# Patient Record
Sex: FEMALE | Race: WHITE | NOT HISPANIC OR LATINO | Employment: OTHER | URBAN - METROPOLITAN AREA
[De-identification: names, ages, dates, MRNs, and addresses within clinical notes are randomized per-mention and may not be internally consistent; named-entity substitution may affect disease eponyms.]

---

## 2017-01-25 RX ORDER — PEN NEEDLE, DIABETIC 31 GX5/16"
NEEDLE, DISPOSABLE MISCELLANEOUS
Qty: 100 EACH | Refills: 11 | Status: SHIPPED | OUTPATIENT
Start: 2017-01-25

## 2017-01-25 NOTE — TELEPHONE ENCOUNTER
Prescription approved per [facility] Refill Protocol.  Due for diabetes follow up in March  Maria Victoria Whittington RN

## 2017-01-25 NOTE — TELEPHONE ENCOUNTER
BD-UF III Short Pen needles     Did not see diagnosis to associate      Last Written Prescription Date: 12/16/2015  Last Fill Quantity: 100, # refills: prn  Last Office Visit with [facility], [facility] or [facility] prescribing provider:  09/21/2016        BP Readings from Last 3 Encounters:   09/21/16 138/70   07/19/16 128/80   05/18/16 136/76     MICROL        6   9/21/2016  No results found for this basename: microalbumin  CREATININE   Date Value Ref Range Status   06/01/2016 0.74 0.52 - 1.04 mg/dL Final   ]  GFR ESTIMATE   Date Value Ref Range Status   06/01/2016 80 >60 mL/min/1.7m2 Final     Comment:     Non  GFR Calc   04/24/2016 84 >60 mL/min/1.7m2 Final     Comment:     Non  GFR Calc   04/23/2016 88 >60 mL/min/1.7m2 Final     Comment:     Non  GFR Calc     GFR ESTIMATE IF BLACK   Date Value Ref Range Status   06/01/2016 >90   GFR Calc   >60 mL/min/1.7m2 Final   04/24/2016 >90   GFR Calc   >60 mL/min/1.7m2 Final   04/23/2016 >90   GFR Calc   >60 mL/min/1.7m2 Final     CHOL      156   9/21/2016  HDL       49   9/21/2016  LDL       66   9/21/2016  LDL       86   3/13/2013  TRIG      205   9/21/2016  CHOLHDLRATIO      3.2   9/10/2015  AST       20   6/1/2016  ALT       39   6/1/2016  A1C      9.6   12/14/2016  A1C      9.3   9/21/2016  A1C      9.6   4/21/2016  A1C      7.9   1/18/2016  A1C      7.8   9/10/2015  POTASSIUM   Date Value Ref Range Status   06/01/2016 4.2 3.4 - 5.3 mmol/L Final

## 2017-02-17 ENCOUNTER — HOSPITAL ENCOUNTER (OUTPATIENT)
Dept: MAMMOGRAPHY | Facility: CLINIC | Age: 62
Discharge: HOME | End: 2017-02-17
Attending: PHYSICIAN ASSISTANT | Admitting: PHYSICIAN ASSISTANT
Payer: COMMERCIAL

## 2017-02-17 DIAGNOSIS — Z12.31 VISIT FOR SCREENING MAMMOGRAM: ICD-10-CM

## 2017-02-17 PROCEDURE — G0202 SCR MAMMO BI INCL CAD: HCPCS

## 2017-03-13 ENCOUNTER — TELEPHONE (OUTPATIENT)
Dept: FAMILY MEDICINE | Facility: OTHER | Age: 62
End: 2017-03-13

## 2017-03-13 NOTE — TELEPHONE ENCOUNTER
"Per fax from [facility] \"Insurance will not cover Lantus Solostar, they will cover Basaglar, Levemir or tresiba, please change to one of these, or please advise and a Prior Authorization will be needed #[id number], ID#[id number], thanks      "

## 2017-03-22 NOTE — PROGRESS NOTES
SUBJECTIVE:                                                    Janet Ram is a 62 year old female who presents to clinic today for the following health issues:        Diabetes Follow-up--    Patient is checking blood sugars: once daily.  Results are as follows:         am - 114, 126  States needs to do better with DM care improved diet    Diabetic concerns: None and other - NEEDS NEW TEST STRIPS     Symptoms of hypoglycemia (low blood sugar): none     Paresthesias (numbness or burning in feet) or sores: No BUT spasms in the legs, upper thighs and ankles.  Drinks plenty of water, thinks it is related to her 12 hour shifts, she is on her feet on hard cement.  Does not stretch      Date of last diabetic eye exam: last week     Hyperlipidemia Follow-Up      Rate your low fat/cholesterol diet?: fair    Taking statin?  Yes, no muscle aches from statin    Other lipid medications/supplements?:  Fish oil/Omega 3, dose 1200mg without side effects     Magnesium is also taking  Hypertension Follow-up      Outpatient blood pressures are not being checked.    Low Salt Diet: no added salt         Amount of exercise or physical activity: None    Problems taking medications regularly: No    Medication side effects: none    Diet: low salt      No concerns       Problem list and histories reviewed & adjusted, as indicated.  Additional history: as documented    Recent Labs   Lab Test  12/14/16   0847  09/21/16   1001  06/01/16   1024  04/24/16   0600  04/23/16   0830  04/21/16   0823   09/10/15   0921   02/25/15   0950   09/10/14   0936   A1C  9.6*  9.3*   --    --    --   9.6*   < >  7.8*   < >  8.0*   < >  8.2*   LDL   --   66   --    --    --    --    --   70   --   63   --    --    HDL   --   49*   --    --    --    --    --   46*   --   45*   --    --    TRIG   --   205*   --    --    --    --    --   150   --   184*   --    --    ALT   --    --   39  16  19   --    < >   --    --   31   --    --    CR   --    --   0.74   "0.71  0.68   --    < >   --    --   0.68   --    --    GFRESTIMATED   --    --   80  84  88   --    < >   --    --   88   --    --    GFRESTBLACK   --    --   >90   GFR Calc    >90   GFR Calc    >90   GFR Calc     --    < >   --    --   >90   GFR Calc     --    --    POTASSIUM   --    --   4.2  4.1  4.1   --    < >   --    --   4.2   --    --    TSH   --   3.83   --    --    --    --    --    --    --    --    --   3.65    < > = values in this interval not displayed.      BP Readings from Last 3 Encounters:   03/27/17 136/72   09/21/16 138/70   07/19/16 128/80    Wt Readings from Last 3 Encounters:   03/27/17 223 lb (101.2 kg)   09/21/16 225 lb (102.1 kg)   06/07/16 225 lb (102.1 kg)                    Reviewed and updated as needed this visit by clinical staff       Reviewed and updated as needed this visit by Provider         ROS:  C: NEGATIVE for fever, chills, change in weight  E/M: NEGATIVE for ear, mouth and throat problems  R: NEGATIVE for significant cough or SOB  CV: NEGATIVE for chest pain, palpitations or peripheral edema  GI: NEGATIVE for nausea, abdominal pain, heartburn, or change in bowel habits  MUSCULOSKELETAL: leg cramps  PSYCHIATRIC: NEGATIVE for changes in mood or affect, a lot of work stress, she is planning to look for a new job    OBJECTIVE:                                                    /72  Pulse 68  Temp 97.3  F (36.3  C) (Temporal)  Resp 14  Ht 5' 4.41\" (1.636 m)  Wt 223 lb (101.2 kg)  BMI 37.79 kg/m2  Body mass index is 37.79 kg/(m^2).  GENERAL: healthy, alert and no distress  NECK: no adenopathy, no asymmetry, masses, or scars and thyroid normal to palpation  RESP: lungs clear to auscultation - no rales, rhonchi or wheezes  CV: regular rate and rhythm, normal S1 S2, no S3 or S4, no murmur, click or rub, no peripheral edema and peripheral pulses strong  ABDOMEN: soft, nontender, no hepatosplenomegaly, no " masses and bowel sounds normal  MS: no gross musculoskeletal defects noted, no edema  SKIN: no suspicious lesions or rashes  PSYCH: mentation appears normal, affect normal/bright  Diabetic foot exam: normal DP and PT pulses, no trophic changes or ulcerative lesions, normal sensory exam and normal monofilament exam    Diagnostic Test Results:  No results found for this or any previous visit (from the past 24 hour(s)).     ASSESSMENT/PLAN:                                                          1. Uncontrolled type 2 diabetes mellitus without complication, without long-term current use of insulin (H)  Has had poor control, likely will order DM ed, pt is agreement   - HONORING CHOICES REFERRAL  - order for DME; Test strips for pt's glucometer, brand as covered by insurance. Test daily  Dispense: 100 each; Refill: 4  - Hemoglobin A1c  - FOOT EXAM    2. Screening for diabetic retinopathy  Eye exam last week, abstracted in    3. Hypertension goal BP (blood pressure) < 140/90  At goal      Recheck in 6 months     Estephanie Mancia PA-C  [facility]  Electronically signed by Estephanie Mancia PA-C

## 2017-03-24 ENCOUNTER — TRANSFERRED RECORDS (OUTPATIENT)
Dept: HEALTH INFORMATION MANAGEMENT | Facility: CLINIC | Age: 62
End: 2017-03-24

## 2017-03-27 ENCOUNTER — OFFICE VISIT (OUTPATIENT)
Dept: FAMILY MEDICINE | Facility: OTHER | Age: 62
End: 2017-03-27
Payer: COMMERCIAL

## 2017-03-27 ENCOUNTER — TELEPHONE (OUTPATIENT)
Dept: FAMILY MEDICINE | Facility: OTHER | Age: 62
End: 2017-03-27

## 2017-03-27 VITALS
TEMPERATURE: 97.3 F | DIASTOLIC BLOOD PRESSURE: 72 MMHG | BODY MASS INDEX: 38.07 KG/M2 | WEIGHT: 223 LBS | RESPIRATION RATE: 14 BRPM | HEIGHT: 64 IN | HEART RATE: 68 BPM | SYSTOLIC BLOOD PRESSURE: 136 MMHG

## 2017-03-27 DIAGNOSIS — Z13.5 SCREENING FOR DIABETIC RETINOPATHY: Primary | ICD-10-CM

## 2017-03-27 DIAGNOSIS — I10 HYPERTENSION GOAL BP (BLOOD PRESSURE) < 140/90: ICD-10-CM

## 2017-03-27 LAB — HBA1C MFR BLD: 10.1 % (ref 4.3–6)

## 2017-03-27 PROCEDURE — 99207 C FOOT EXAM  NO CHARGE: CPT | Performed by: PHYSICIAN ASSISTANT

## 2017-03-27 PROCEDURE — 99213 OFFICE O/P EST LOW 20 MIN: CPT | Performed by: PHYSICIAN ASSISTANT

## 2017-03-27 PROCEDURE — 83036 HEMOGLOBIN GLYCOSYLATED A1C: CPT | Performed by: PHYSICIAN ASSISTANT

## 2017-03-27 PROCEDURE — 36415 COLL VENOUS BLD VENIPUNCTURE: CPT | Performed by: PHYSICIAN ASSISTANT

## 2017-03-27 RX ORDER — ATORVASTATIN CALCIUM 40 MG/1
TABLET, FILM COATED ORAL
COMMUNITY
Start: 2017-03-13 | End: 2017-09-15

## 2017-03-27 ASSESSMENT — PAIN SCALES - GENERAL: PAINLEVEL: NO PAIN (0)

## 2017-03-27 NOTE — NURSING NOTE
"Chief Complaint   Patient presents with     Hypertension     Diabetes     Lipids     Panel Management     Honoring choices, eye exam, dm mae, marco        Initial /72  Pulse 68  Temp 97.3  F (36.3  C) (Temporal)  Resp 14  Ht 5' 4.41\" (1.636 m)  Wt 223 lb (101.2 kg)  BMI 37.79 kg/m2 Estimated body mass index is 37.79 kg/(m^2) as calculated from the following:    Height as of this encounter: 5' 4.41\" (1.636 m).    Weight as of this encounter: 223 lb (101.2 kg).  Medication Reconciliation: complete   Idalia Roberts CMA  (AAMA)'      "

## 2017-03-27 NOTE — MR AVS SNAPSHOT
"              After Visit Summary   3/27/2017    Janet Ram    MRN: [id number]           Patient Information     Date Of Birth          1955        Visit Information        Provider Department      3/27/2017 9:00 AM Estephanie Mancia PA-C [facility]        Today's Diagnoses     Screening for diabetic retinopathy    -  1    Uncontrolled type 2 diabetes mellitus without complication, without long-term current use of insulin (H)           Follow-ups after your visit        Who to contact     If you have questions or need follow up information about today's clinic visit or your schedule please contact [facility] directly at [phone].  Normal or non-critical lab and imaging results will be communicated to you by MyChart, letter or phone within 4 business days after the clinic has received the results. If you do not hear from us within 7 days, please contact the clinic through MyChart or phone. If you have a critical or abnormal lab result, we will notify you by phone as soon as possible.  Submit refill requests through Crunched or call your pharmacy and they will forward the refill request to us. Please allow 3 business days for your refill to be completed.          Additional Information About Your Visit        MyChart Information     Crunched lets you send messages to your doctor, view your test results, renew your prescriptions, schedule appointments and more. To sign up, go to [email or web address] . Click on \"Log in\" on the left side of the screen, which will take you to the Welcome page. Then click on \"Sign up Now\" on the right side of the page.     You will be asked to enter the access code listed below, as well as some personal information. Please follow the directions to create your username and password.     Your access code is: [email or web address]  Expires: 2017  9:27 AM     Your access code will  in 90 days. If you need help or a new code, please call your " "[facility] or [phone].        Care EveryWhere ID     This is your Care EveryWhere ID. This could be used by other organizations to access your [facility] medical records  [id number]        Your Vitals Were     Pulse Temperature Respirations Height BMI (Body Mass Index)       68 97.3  F (36.3  C) (Temporal) 14 5' 4.41\" (1.636 m) 37.79 kg/m2        Blood Pressure from Last 3 Encounters:   03/27/17 136/72   09/21/16 138/70   07/19/16 128/80    Weight from Last 3 Encounters:   03/27/17 223 lb (101.2 kg)   09/21/16 225 lb (102.1 kg)   06/07/16 225 lb (102.1 kg)              Today, you had the following     No orders found for display         Today's Medication Changes          These changes are accurate as of: 3/27/17  9:27 AM.  If you have any questions, ask your nurse or doctor.               These medicines have changed or have updated prescriptions.        Dose/Directions    atorvastatin 40 MG tablet   Commonly known as:  LIPITOR   This may have changed:  Another medication with the same name was removed. Continue taking this medication, and follow the directions you see here.   Changed by:  Estephanie Mancia PA-C        Refills:  0                Primary Care Provider Office Phone # Fax #    Estephanie Mancia PA-C [phone]       [facility] [address] [place] [address]        Thank you!     Thank you for choosing [facility]  for your care. Our goal is always to provide you with excellent care. Hearing back from our patients is one way we can continue to improve our services. Please take a few minutes to complete the written survey that you may receive in the mail after your visit with us. Thank you!             Your Updated Medication List - Protect others around you: Learn how to safely use, store and throw away your medicines at www.disposemymeds.org.          This list is accurate as of: 3/27/17  9:27 AM.  Always use your most recent med list.          "          Brand Name Dispense Instructions for use    aspirin 81 MG tablet      1 TABLET DAILY       atorvastatin 40 MG tablet    LIPITOR         B-D U/F 31G X 8 MM   Generic drug:  insulin pen needle     100 each    USE ONE  ONCE DAILY OR  AS  DIRECTED       WYATT CONTOUR test strip   Generic drug:  blood glucose monitoring     50 each    TEST BLOOD SUGARS ONE TIME DAILY       bisacodyl 5 MG EC tablet    DULCOLAX     Take 5 mg by mouth daily as needed for constipation (she took 3 tablets a week ago Friday) Reported on 3/27/2017       glipiZIDE 10 MG 24 hr tablet    GLUCOTROL XL    180 tablet    Take 1 tablet (10 mg) by mouth 2 times daily       insulin detemir 100 UNIT/ML injection    LEVEMIR FLEXPEN/FLEXTOUCH    3 mL    18 units at bedtime       lisinopril 10 MG tablet    PRINIVIL/ZESTRIL    90 tablet    Take 1 tablet (10 mg) by mouth daily       metFORMIN 1000 MG tablet    GLUCOPHAGE    180 tablet    TAKE ONE TABLET BY MOUTH TWICE DAILY WITH MEALS       ONE TOUCH LANCETS Misc     month supply    use tid       order for DME     1 Box    Equipment being ordered: needles for solostar flex pen       polyethylene glycol powder    MIRALAX/GLYCOLAX    850 g    Take 17 g (1 capful) by mouth daily       senna-docusate 8.6-50 MG per tablet    SENOKOT-S;PERICOLACE    100 tablet    Take 1-2 tablets by mouth 2 times daily as needed for constipation

## 2017-03-27 NOTE — TELEPHONE ENCOUNTER
Please call pt- her hgb a1c is now higher yet, it is 10.1.  i will order diabetic ed for her as we discussed.  I will see her again in 6 months.  They will call her to schedule this appointment ( if that is incorrect, please help her set this up)   Estephanie Mancia PA-C

## 2017-04-19 ENCOUNTER — TELEPHONE (OUTPATIENT)
Dept: FAMILY MEDICINE | Facility: OTHER | Age: 62
End: 2017-04-19

## 2017-04-19 DIAGNOSIS — E11.9 TYPE 2 DIABETES, HBA1C GOAL < 7% (H): Primary | ICD-10-CM

## 2017-04-19 NOTE — TELEPHONE ENCOUNTER
[facility] is requesting a new rx for blood glucose meter, lancets, lancing device and alcohol prep pads.

## 2017-04-20 RX ORDER — LANCETS
EACH MISCELLANEOUS
Qty: 1 BOX | Refills: 3 | Status: SHIPPED | OUTPATIENT
Start: 2017-04-20 | End: 2020-02-14

## 2017-04-20 NOTE — TELEPHONE ENCOUNTER
Updated med list and rx sent.    Prescription approved per [facility] Refill Protocol.  Hang Barron RN

## 2017-05-22 ENCOUNTER — TELEPHONE (OUTPATIENT)
Dept: FAMILY MEDICINE | Facility: OTHER | Age: 62
End: 2017-05-22

## 2017-05-22 DIAGNOSIS — E11.9 TYPE 2 DIABETES, HBA1C GOAL < 7% (H): Primary | ICD-10-CM

## 2017-05-22 NOTE — TELEPHONE ENCOUNTER
Panel Management Review      Patient has the following on her problem list:     Diabetes    ASA: Passed    Last A1C  Lab Results   Component Value Date    A1C 10.1 03/27/2017    A1C 9.6 12/14/2016    A1C 9.3 09/21/2016    A1C 9.6 04/21/2016    A1C 7.9 01/18/2016     A1C tested: FAILED    Last LDL:    Lab Results   Component Value Date    CHOL 156 09/21/2016     Lab Results   Component Value Date    HDL 49 09/21/2016     Lab Results   Component Value Date    LDL 66 09/21/2016     Lab Results   Component Value Date    TRIG 205 09/21/2016     Lab Results   Component Value Date    CHOLHDLRATIO 3.2 09/10/2015     Lab Results   Component Value Date    NHDL 107 09/21/2016       Is the patient on a Statin? YES             Is the patient on Aspirin? YES    Medications     HMG CoA Reductase Inhibitors    atorvastatin (LIPITOR) 40 MG tablet    Salicylates    ASPIRIN 81 MG OR TABS          Last three blood pressure readings:  BP Readings from Last 3 Encounters:   03/27/17 136/72   09/21/16 138/70   07/19/16 128/80       Date of last diabetes office visit: 3/27/07     Tobacco History:     History   Smoking Status     Never Smoker   Smokeless Tobacco     Never Used         Hypertension   Last three blood pressure readings:  BP Readings from Last 3 Encounters:   03/27/17 136/72   09/21/16 138/70   07/19/16 128/80     Blood pressure: Passed    HTN Guidelines:  Age 18-59 BP range:  Less than 140/90  Age 60-85 with Diabetes:  Less than 140/90  Age 60-85 without Diabetes:  less than 150/90      Composite cancer screening  Chart review shows that this patient is due/due soon for the following None  Summary:    Patient is due/failing the following:   CMP, DM education, A1C and FOLLOW UP    Action needed:   Patient needs office visit for Diabetic recheck. Due 8/2017, please see if she is willing to schedule.  Patient needs non-fasting lab only appointment. Orders placed.  Patient needs to schedule with DM education. Referral placed,  just needs scheduling.    Type of outreach:    Phone, left message for patient to call back.     Questions for provider review:    None                                                                                                                                    Chloé Enrique CMA ([facility])       Chart routed to Care Team .

## 2017-06-23 DIAGNOSIS — E78.5 HYPERLIPIDEMIA LDL GOAL <100: ICD-10-CM

## 2017-06-26 RX ORDER — LISINOPRIL 10 MG/1
TABLET ORAL
Qty: 90 TABLET | Refills: 0 | Status: SHIPPED | OUTPATIENT
Start: 2017-06-26 | End: 2017-09-15

## 2017-06-26 NOTE — TELEPHONE ENCOUNTER
Routing refill request to provider for review/approval because:  Appears to be a break in medication - should have been out in March    Josie Flowers, RN, BSN

## 2017-06-26 NOTE — TELEPHONE ENCOUNTER
lisinopril (PRINIVIL,ZESTRIL) 10 MG tablet      Last Written Prescription Date: 9/21/16  Last Fill Quantity: 90, # refills: 1  Last Office Visit with [facility], [facility] or [facility] prescribing provider: 3/27/17 SIVA  Next 5 appointments (look out 90 days)     Aug 21, 2017  7:30 AM CDT   Office Visit with Estephanie Mancia PA-C   [facility]    [address] [facility] [address] [phone]                   Potassium   Date Value Ref Range Status   06/01/2016 4.2 3.4 - 5.3 mmol/L Final     Creatinine   Date Value Ref Range Status   06/01/2016 0.74 0.52 - 1.04 mg/dL Final     BP Readings from Last 3 Encounters:   03/27/17 136/72   09/21/16 138/70   07/19/16 128/80

## 2017-08-23 NOTE — PROGRESS NOTES
SUBJECTIVE:                                                    Janet Ram is a 62 year old female who presents to clinic today for the following health issues:      History of Present Illness   Diabetes:     Frequency of checking blood sugars::  Rarely (had been very lax, last tested in April 129)    Diabetic concerns::  None    Hypoglycemia symptoms::  None    Paraesthesia present::  YES    Eye Exam in the last year::  Yes    03 23 2017  Hyperlipidemia:     Low fat/chol diet rating::  Fair (trying to do low carb)    Taking Statins::  YES    Side effects from hypolipidemia medication::  No muscle aches from Statin    Lipid Medications or Supplements::  None  Hypertension:     Outpatient blood pressures:  Are not being checked    Dietary sodium intake::  Low salt diet  Diet::  Diabetic (she has gained weight )  Frequency of exercise::  None  Taking medications regularly::  Yes  Medication side effects::  None  Additional concerns today::  No      Has had a lot of stress recently, her sister in law is in residential related to alcohol use and they have been in charge of helping her as her in laws are away    Problem list and histories reviewed & adjusted, as indicated.  Additional history: as documented        Recent Labs   Lab Test  03/27/17   0928  12/14/16   0847  09/21/16   1001  06/01/16   1024  04/24/16   0600  04/23/16   0830   09/10/15   0921   02/25/15   0950   09/10/14   0936   A1C  10.1*  9.6*  9.3*   --    --    --    < >  7.8*   < >  8.0*   < >  8.2*   LDL   --    --   66   --    --    --    --   70   --   63   --    --    HDL   --    --   49*   --    --    --    --   46*   --   45*   --    --    TRIG   --    --   205*   --    --    --    --   150   --   184*   --    --    ALT   --    --    --   39  16  19   < >   --    --   31   --    --    CR   --    --    --   0.74  0.71  0.68   < >   --    --   0.68   --    --    GFRESTIMATED   --    --    --   80  84  88   < >   --    --   88   --    --   "  GFRESTBLACK   --    --    --   >90   GFR Calc    >90   GFR Calc    >90   GFR Calc     < >   --    --   >90   GFR Calc     --    --    POTASSIUM   --    --    --   4.2  4.1  4.1   < >   --    --   4.2   --    --    TSH   --    --   3.83   --    --    --    --    --    --    --    --   3.65    < > = values in this interval not displayed.      BP Readings from Last 3 Encounters:   08/29/17 132/84   03/27/17 136/72   09/21/16 138/70    Wt Readings from Last 3 Encounters:   08/29/17 226 lb 4.8 oz (102.6 kg)   03/27/17 223 lb (101.2 kg)   09/21/16 225 lb (102.1 kg)                  Labs reviewed in EPIC        ROS:  C: NEGATIVE for fever, chills, change in weight  E/M: NEGATIVE for ear, mouth and throat problems  R: NEGATIVE for significant cough or SOB  CV: NEGATIVE for chest pain, palpitations or peripheral edema  GI: NEGATIVE for nausea, abdominal pain, heartburn, or change in bowel habits  PSYCHIATRIC: feels stressed and anxious, might be better now as she is in care home as above    OBJECTIVE:     /84 (Cuff Size: Adult Regular)  Pulse 72  Temp 97.8  F (36.6  C) (Temporal)  Resp 16  Ht 5' 4.5\" (1.638 m)  Wt 226 lb 4.8 oz (102.6 kg)  BMI 38.24 kg/m2  Body mass index is 38.24 kg/(m^2).  GENERAL: healthy, alert and no distress  NECK: no adenopathy, no asymmetry, masses, or scars and thyroid normal to palpation  RESP: lungs clear to auscultation - no rales, rhonchi or wheezes  CV: regular rate and rhythm, normal S1 S2, no S3 or S4, no murmur, click or rub, no peripheral edema and peripheral pulses strong  ABDOMEN: soft, nontender, no hepatosplenomegaly, no masses and bowel sounds normal  MS: no gross musculoskeletal defects noted, no edema  PSYCH: mentation appears normal, affect normal/bright  Diabetic foot exam: normal DP and PT pulses, no trophic changes or ulcerative lesions and normal sensory exam    Diagnostic Test Results:  No results found " for this or any previous visit (from the past 24 hour(s)).    ASSESSMENT/PLAN:         1. Hypertension goal BP (blood pressure) < 140/90  At goal, though nearing upper limits of normal  - Comprehensive metabolic panel (BMP + Alb, Alk Phos, ALT, AST, Total. Bili, TP)    2. Uncontrolled type 2 diabetes mellitus without complication, without long-term current use of insulin (H)  Plans to see endocrine in Dec, for now she will work very hard with diet and exercise.  She will also lose weight and get back to her typical dm diet   - Comprehensive metabolic panel (BMP + Alb, Alk Phos, ALT, AST, Total. Bili, TP)  - Hemoglobin A1c  - ENDOCRINOLOGY ADULT REFERRAL    3. Hyperlipidemia LDL goal <100    - Lipid panel reflex to direct LDL  - Comprehensive metabolic panel (BMP + Alb, Alk Phos, ALT, AST, Total. Bili, TP)    Recheck a1c for sure in 3 months though likely will follow through with plans to follow up with endocrine    Estephanie Mancia PA-C  [facility]  Answers for HPI/ROS submitted by the patient on 8/29/2017   PHQ-2 Score: 0Electronically signed by Estephanie Mancia PA-C

## 2017-08-29 ENCOUNTER — OFFICE VISIT (OUTPATIENT)
Dept: FAMILY MEDICINE | Facility: OTHER | Age: 62
End: 2017-08-29
Payer: COMMERCIAL

## 2017-08-29 ENCOUNTER — TELEPHONE (OUTPATIENT)
Dept: FAMILY MEDICINE | Facility: OTHER | Age: 62
End: 2017-08-29

## 2017-08-29 VITALS
WEIGHT: 226.3 LBS | RESPIRATION RATE: 16 BRPM | DIASTOLIC BLOOD PRESSURE: 84 MMHG | SYSTOLIC BLOOD PRESSURE: 132 MMHG | HEIGHT: 65 IN | TEMPERATURE: 97.8 F | HEART RATE: 72 BPM | BODY MASS INDEX: 37.7 KG/M2

## 2017-08-29 DIAGNOSIS — I10 HYPERTENSION GOAL BP (BLOOD PRESSURE) < 140/90: Primary | ICD-10-CM

## 2017-08-29 DIAGNOSIS — E78.5 HYPERLIPIDEMIA LDL GOAL <100: ICD-10-CM

## 2017-08-29 LAB
ALBUMIN SERPL-MCNC: 3.3 G/DL (ref 3.4–5)
ALP SERPL-CCNC: 117 U/L (ref 40–150)
ALT SERPL W P-5'-P-CCNC: 28 U/L (ref 0–50)
ANION GAP SERPL CALCULATED.3IONS-SCNC: 9 MMOL/L (ref 3–14)
AST SERPL W P-5'-P-CCNC: 14 U/L (ref 0–45)
BILIRUB SERPL-MCNC: 1.3 MG/DL (ref 0.2–1.3)
BUN SERPL-MCNC: 13 MG/DL (ref 7–30)
CALCIUM SERPL-MCNC: 8.9 MG/DL (ref 8.5–10.1)
CHLORIDE SERPL-SCNC: 108 MMOL/L (ref 94–109)
CHOLEST SERPL-MCNC: 152 MG/DL
CO2 SERPL-SCNC: 25 MMOL/L (ref 20–32)
CREAT SERPL-MCNC: 0.82 MG/DL (ref 0.52–1.04)
GFR SERPL CREATININE-BSD FRML MDRD: 70 ML/MIN/1.7M2
GLUCOSE SERPL-MCNC: 224 MG/DL (ref 70–99)
HBA1C MFR BLD: 10.4 % (ref 4.3–6)
HDLC SERPL-MCNC: 46 MG/DL
LDLC SERPL CALC-MCNC: 66 MG/DL
NONHDLC SERPL-MCNC: 106 MG/DL
POTASSIUM SERPL-SCNC: 4.4 MMOL/L (ref 3.4–5.3)
PROT SERPL-MCNC: 7.1 G/DL (ref 6.8–8.8)
SODIUM SERPL-SCNC: 142 MMOL/L (ref 133–144)
TRIGL SERPL-MCNC: 201 MG/DL

## 2017-08-29 PROCEDURE — 99214 OFFICE O/P EST MOD 30 MIN: CPT | Performed by: PHYSICIAN ASSISTANT

## 2017-08-29 PROCEDURE — 80061 LIPID PANEL: CPT | Performed by: PHYSICIAN ASSISTANT

## 2017-08-29 PROCEDURE — 83036 HEMOGLOBIN GLYCOSYLATED A1C: CPT | Performed by: PHYSICIAN ASSISTANT

## 2017-08-29 PROCEDURE — 80053 COMPREHEN METABOLIC PANEL: CPT | Performed by: PHYSICIAN ASSISTANT

## 2017-08-29 PROCEDURE — 36415 COLL VENOUS BLD VENIPUNCTURE: CPT | Performed by: PHYSICIAN ASSISTANT

## 2017-08-29 ASSESSMENT — PAIN SCALES - GENERAL: PAINLEVEL: NO PAIN (0)

## 2017-08-29 NOTE — NURSING NOTE
"Chief Complaint   Patient presents with     Recheck Medication     Panel Management     See        Initial /84 (Cuff Size: Adult Regular)  Pulse 72  Temp 97.8  F (36.6  C) (Temporal)  Resp 16  Ht 5' 4.5\" (1.638 m)  Wt 226 lb 4.8 oz (102.6 kg)  BMI 38.24 kg/m2 Estimated body mass index is 38.24 kg/(m^2) as calculated from the following:    Height as of this encounter: 5' 4.5\" (1.638 m).    Weight as of this encounter: 226 lb 4.8 oz (102.6 kg).  Medication Reconciliation: complete   Quynh Feliz CMA (AAMA)    "

## 2017-08-29 NOTE — LETTER
August 29, 2017      Janet Ram  [address] [facility]   [place] [address]        Dear ,    We are writing to inform you of your test results. We tried to contact you by phone but were unsuccessful.      Your cholesterol is fairly well controlled with atorvastatin though the triglycerides are elevated.  This will improve with improved diabetic control.  Your kidney functions, liver functions and electrolytes are normal. Your blood sugar was high at 224. Your hemoglobin A1C is higher yet at 10.4.  Lets increase your levimir dosage to  21 units at bedtime.    Resulted Orders   Lipid panel reflex to direct LDL   Result Value Ref Range    Cholesterol 152 <200 mg/dL    Triglycerides 201 (H) <150 mg/dL      Comment:      Borderline high:  150-199 mg/dl  High:             200-499 mg/dl  Very high:       >499 mg/dl      HDL Cholesterol 46 (L) >49 mg/dL    LDL Cholesterol Calculated 66 <100 mg/dL      Comment:      Desirable:       <100 mg/dl    Non HDL Cholesterol 106 <130 mg/dL   Comprehensive metabolic panel (BMP + Alb, Alk Phos, ALT, AST, Total. Bili, TP)   Result Value Ref Range    Sodium 142 133 - 144 mmol/L    Potassium 4.4 3.4 - 5.3 mmol/L    Chloride 108 94 - 109 mmol/L    Carbon Dioxide 25 20 - 32 mmol/L    Anion Gap 9 3 - 14 mmol/L    Glucose 224 (H) 70 - 99 mg/dL    Urea Nitrogen 13 7 - 30 mg/dL    Creatinine 0.82 0.52 - 1.04 mg/dL    GFR Estimate 70 >60 mL/min/1.7m2      Comment:      Non  GFR Calc    GFR Estimate If Black 85 >60 mL/min/1.7m2      Comment:       GFR Calc    Calcium 8.9 8.5 - 10.1 mg/dL    Bilirubin Total 1.3 0.2 - 1.3 mg/dL    Albumin 3.3 (L) 3.4 - 5.0 g/dL    Protein Total 7.1 6.8 - 8.8 g/dL    Alkaline Phosphatase 117 40 - 150 U/L    ALT 28 0 - 50 U/L    AST 14 0 - 45 U/L   Hemoglobin A1c   Result Value Ref Range    Hemoglobin A1C 10.4 (H) 4.3 - 6.0 %       If you have any questions or concerns, please call the clinic at the number listed  above.       Sincerely,        Estephanie Mancia PA-C

## 2017-08-29 NOTE — TELEPHONE ENCOUNTER
Tried to call patient but the numbers we have are not correct or there was not an answer, so I mailed letter with results  Closing encounter  Malou Amaro RT (R)

## 2017-08-29 NOTE — TELEPHONE ENCOUNTER
Please call pt and mail results if she would like- your cholesterol is fairly well controlled with atorvastatin though the triglycerides are elevated.  This will improve with improved diabetic control.  Your kidney functions, liver functions and electrolytes are normal. Your blood sugar was high at 224. Your hemoglobin A1C is higher yet at 10.4.  Lets increase your levimir dosage to  21 units at bedtime  Estephanie Mancia PA-C

## 2017-08-29 NOTE — MR AVS SNAPSHOT
After Visit Summary   8/29/2017    Janet Ram    MRN: [id number]           Patient Information     Date Of Birth          1955        Visit Information        Provider Department      8/29/2017 8:00 AM Estephanie Mancia PA-C [facility]        Today's Diagnoses     Hypertension goal BP (blood pressure) < 140/90    -  1    Uncontrolled type 2 diabetes mellitus without complication, without long-term current use of insulin (H)        Hyperlipidemia LDL goal <100           Follow-ups after your visit        Additional Services     ENDOCRINOLOGY ADULT REFERRAL       Your provider has referred you to: [facility]: [facility] [phone]   [email or web address]      Please be aware that coverage of these services is subject to the terms and limitations of your health insurance plan.  Call member services at your health plan with any benefit or coverage questions.      Please bring the following to your appointment:    >>   Any x-rays, CTs or MRIs which have been performed.  Contact the facility where they were done to arrange for  prior to your scheduled appointment.    >>   List of current medications   >>   This referral request   >>   Any documents/labs given to you for this referral                  Your next 10 appointments already scheduled     Dec 20, 2017  9:00 AM CST   New Visit with Leandra Puri MD   [facility]    [id number] [place] [id number] [phone]              Who to contact     If you have questions or need follow up information about today's clinic visit or your schedule please contact [facility] [place] directly at [phone].  Normal or non-critical lab and imaging results will be communicated to you by MyChart, letter or phone within 4 business days after the clinic has received the results.  "If you do not hear from us within 7 days, please contact the clinic through [employer] or phone. If you have a critical or abnormal lab result, we will notify you by phone as soon as possible.  Submit refill requests through [employer] or call your pharmacy and they will forward the refill request to us. Please allow 3 business days for your refill to be completed.          Additional Information About Your Visit        [employer] Information     [employer] lets you send messages to your doctor, view your test results, renew your prescriptions, schedule appointments and more. To sign up, go to [email or web address] . Click on \"Log in\" on the left side of the screen, which will take you to the Welcome page. Then click on \"Sign up Now\" on the right side of the page.     You will be asked to enter the access code listed below, as well as some personal information. Please follow the directions to create your username and password.     Your access code is: [email or web address]  Expires: 2017  8:41 AM     Your access code will  in 90 days. If you need help or a new code, please call your [facility] clinic or [phone].        Care EveryWhere ID     This is your Care EveryWhere ID. This could be used by other organizations to access your [facility] medical records  [id number]        Your Vitals Were     Pulse Temperature Respirations Height BMI (Body Mass Index)       72 97.8  F (36.6  C) (Temporal) 16 5' 4.5\" (1.638 m) 38.24 kg/m2        Blood Pressure from Last 3 Encounters:   17 132/84   17 136/72   16 138/70    Weight from Last 3 Encounters:   17 226 lb 4.8 oz (102.6 kg)   17 223 lb (101.2 kg)   16 225 lb (102.1 kg)              We Performed the Following     Comprehensive metabolic panel (BMP + Alb, Alk Phos, ALT, AST, Total. Bili, TP)     ENDOCRINOLOGY ADULT REFERRAL     Hemoglobin A1c     Lipid panel reflex to direct LDL        Primary Care Provider Office Phone # Fax #    Estephanie " HALINA Mancia [phone]       [address] [place] [address]        Equal Access to Services     LUIS ANNE : Hadii aad ku hadeunicematthew Soflorencio, waasifda luprasanthhetalha, maurisiota katerrida tatiana, august renzoin hayaacarlos louisemarie barroso latremaynecarlos kirk. So [facility] [phone].    ATENCIÓN: Si habla español, tiene a vides disposición servicios gratuitos de asistencia lingüística. Llame al [phone].    We comply with applicable federal civil rights laws and [place] laws. We do not discriminate on the basis of race, color, national origin, age, disability sex, sexual orientation or gender identity.            Thank you!     Thank you for choosing [facility]  for your care. Our goal is always to provide you with excellent care. Hearing back from our patients is one way we can continue to improve our services. Please take a few minutes to complete the written survey that you may receive in the mail after your visit with us. Thank you!             Your Updated Medication List - Protect others around you: Learn how to safely use, store and throw away your medicines at www.disposemymeds.org.          This list is accurate as of: 8/29/17  8:41 AM.  Always use your most recent med list.                   Brand Name Dispense Instructions for use Diagnosis    Alcohol Wipes Pads     100 each    1 each daily Use to test blood sugar 1 times dailyPlease give patient meter, test trips and lancets as covered by insurance.    Type 2 diabetes, HbA1c goal < 7% (H), Uncontrolled diabetes mellitus type 2 without complications (H)       aspirin 81 MG tablet      1 TABLET DAILY        atorvastatin 40 MG tablet    LIPITOR          B-D U/F 31G X 8 MM   Generic drug:  insulin pen needle     100 each    USE ONE  ONCE DAILY OR  AS  DIRECTED    Type 2 diabetes mellitus, uncontrolled (H)       bisacodyl 5 MG EC tablet    DULCOLAX     Take 5 mg by mouth daily as needed for constipation (she took 3 tablets a week ago Friday) Reported  on 3/27/2017        blood glucose monitoring meter device kit    no brand specified    1 kit    Use to test blood sugar 1 times daily or as directed. Please give patient meter, test trips and lancets as covered by insurance.    Type 2 diabetes, HbA1c goal < 7% (H), Uncontrolled diabetes mellitus type 2 without complications (H)       blood glucose monitoring test strip    no brand specified    100 each    Use to test blood sugar 1 times daily or as directed. Please give patient meter, test trips and lancets as covered by insurance.    Type 2 diabetes, HbA1c goal < 7% (H), Uncontrolled diabetes mellitus type 2 without complications (H)       glipiZIDE 10 MG 24 hr tablet    GLUCOTROL XL    180 tablet    Take 1 tablet (10 mg) by mouth 2 times daily    Uncontrolled diabetes mellitus type 2 without complications (H)       insulin detemir 100 UNIT/ML injection    LEVEMIR FLEXPEN/FLEXTOUCH    3 mL    18 units at bedtime    Uncontrolled diabetes mellitus type 2 without complications, unspecified long term insulin use status (H)       lisinopril 10 MG tablet    PRINIVIL/ZESTRIL    90 tablet    TAKE ONE TABLET BY MOUTH ONCE DAILY    Hyperlipidemia LDL goal <100       metFORMIN 1000 MG tablet    GLUCOPHAGE    180 tablet    TAKE ONE TABLET BY MOUTH TWICE DAILY WITH MEALS    Uncontrolled diabetes mellitus type 2 without complications (H)       order for DME     1 Box    Equipment being ordered: needles for solostar flex pen    Type 2 diabetes, HbA1c goal < 7% (H)       polyethylene glycol powder    MIRALAX/GLYCOLAX    850 g    Take 17 g (1 capful) by mouth daily    Obstipation       senna-docusate 8.6-50 MG per tablet    SENOKOT-S;PERICOLACE    100 tablet    Take 1-2 tablets by mouth 2 times daily as needed for constipation    Obstipation       thin lancets    NO BRAND SPECIFIED    1 Box    Use to test blood sugar 1 times daily or as directed. Please give patient meter, test trips and lancets as covered by insurance.    Type 2  diabetes, HbA1c goal < 7% (H), Uncontrolled diabetes mellitus type 2 without complications (H)

## 2017-09-15 DIAGNOSIS — I10 HYPERTENSION GOAL BP (BLOOD PRESSURE) < 140/90: Primary | ICD-10-CM

## 2017-09-15 DIAGNOSIS — E78.5 HYPERLIPIDEMIA LDL GOAL <100: ICD-10-CM

## 2017-09-15 NOTE — TELEPHONE ENCOUNTER
lisinopril      Last Written Prescription Date: 06/26/17  Last Fill Quantity: 90, # refills: 0  Last Office Visit with G, [facility] or [facility] prescribing provider: 08/29/17       Potassium   Date Value Ref Range Status   08/29/2017 4.4 3.4 - 5.3 mmol/L Final     Creatinine   Date Value Ref Range Status   08/29/2017 0.82 0.52 - 1.04 mg/dL Final     BP Readings from Last 3 Encounters:   08/29/17 132/84   03/27/17 136/72   09/21/16 138/70

## 2017-09-18 RX ORDER — LISINOPRIL 10 MG/1
TABLET ORAL
Qty: 90 TABLET | Refills: 2 | Status: SHIPPED | OUTPATIENT
Start: 2017-09-18 | End: 2018-04-24

## 2017-11-29 RX ORDER — GLIPIZIDE 10 MG/1
TABLET, FILM COATED, EXTENDED RELEASE ORAL
Qty: 180 TABLET | Refills: 0 | Status: SHIPPED | OUTPATIENT
Start: 2017-11-29 | End: 2018-05-08

## 2017-11-29 NOTE — TELEPHONE ENCOUNTER
Requested Prescriptions   Pending Prescriptions Disp Refills     glipiZIDE (GLUCOTROL XL) 10 MG 24 hr tablet [Pharmacy Med Name: GLIPIZIDE XL 10MG   TAB] 180 tablet 1     Sig: TAKE ONE TABLET BY MOUTH TWICE DAILY    Sulfonylurea Agents Failed    11/29/2017  1:07 PM       Failed - Patient has had a Microalbumin in the past 12 mos.    Recent Labs   Lab Test  09/21/16   1001   MICROL  6   UMALCR  9.87            Failed - Patient has documented A1c within the specified period of time.    Recent Labs   Lab Test  08/29/17   0851   A1C  10.4*            Passed - Patient's BP is less than 140/90    BP Readings from Last 3 Encounters:   08/29/17 132/84   03/27/17 136/72   09/21/16 138/70                Passed - Patient has documented LDL within the past 12 mos.    Recent Labs   Lab Test  08/29/17   0851   LDL  66            Passed - Patient is age 18 or older       Passed - No active pregnancy on record       Passed - Patient has a recent creatinine (normal) within the past 12 mos.    Recent Labs   Lab Test  08/29/17   0851   CR  0.82            Passed - Patient has not had a positive pregnancy test within the past 12 mos.       Passed - Patient has had an appointment with authorizing provider within the past 6 mos. or  within next 30 days    Patient had office visit in the last 6 months or has a visit in the next 30 days with authorizing provider.  See chart review.             metFORMIN (GLUCOPHAGE) 1000 MG tablet [Pharmacy Med Name: METFORMIN 1000MG TAB] 180 tablet 1     Sig: TAKE ONE TABLET BY MOUTH TWICE DAILY WITH MEALS    Biguanide Agents Failed    11/29/2017  1:07 PM       Failed - Patient has had a Microalbumin in the past 12 mos.    Recent Labs   Lab Test  09/21/16   1001   MICROL  6   UMALCR  9.87            Failed - Patient has documented A1c within the specified period of time.    Recent Labs   Lab Test  08/29/17   0851   A1C  10.4*            Passed - Patient's BP is less than 140/90    BP Readings from Last 3  Encounters:   08/29/17 132/84   03/27/17 136/72   09/21/16 138/70                Passed - Patient has documented LDL within the past 12 mos.    Recent Labs   Lab Test  08/29/17   0851   LDL  66            Passed - Patient is age 10 or older       Passed - Patient's CR is NOT>1.4 OR Patient's EGFR is NOT<45 within past 12 mos.    Recent Labs   Lab Test  08/29/17   0851   GFRESTIMATED  70   GFRESTBLACK  85       Recent Labs   Lab Test  08/29/17   0851   CR  0.82            Passed - Patient does NOT have a diagnosis of CHF.       Passed - Patient is not pregnant       Passed - Patient has not had a positive pregnancy test within the past 12 mos.        Passed - Recent (6 mos) or future visit with authorizing provider's specialty    Patient had office visit in the last 6 months or has a visit in the next 30 days with authorizing provider.  See chart review.           glipiZIDE (GLUCOTROL XL) 10 MG 24 hr tablet  Routing refill request to provider for review/approval because:  Labs not current:  microalbumin  A break in medication, should have needed refills around 03/2017  Patient needs to be seen because:  Should be due for labs now and to follow up with Endocrine     metFORMIN (GLUCOPHAGE) 1000 MG tablet  Routing refill request to provider for review/approval because:  Labs not current:  microalbumin  A break in medication, should have needed refills around 03/2017  Patient needs to be seen because:  Should be due for labs now and to follow up with Endocrine     Shannan Martinez RN, BSN

## 2017-12-20 ENCOUNTER — OFFICE VISIT (OUTPATIENT)
Dept: ENDOCRINOLOGY | Facility: CLINIC | Age: 62
End: 2017-12-20
Attending: PHYSICIAN ASSISTANT
Payer: COMMERCIAL

## 2017-12-20 VITALS
BODY MASS INDEX: 37.43 KG/M2 | HEIGHT: 65 IN | SYSTOLIC BLOOD PRESSURE: 160 MMHG | HEART RATE: 76 BPM | DIASTOLIC BLOOD PRESSURE: 84 MMHG | WEIGHT: 224.65 LBS

## 2017-12-20 DIAGNOSIS — R53.83 FATIGUE, UNSPECIFIED TYPE: ICD-10-CM

## 2017-12-20 LAB — HBA1C MFR BLD: 10.8 % (ref 0–5.7)

## 2017-12-20 PROCEDURE — 99204 OFFICE O/P NEW MOD 45 MIN: CPT | Performed by: INTERNAL MEDICINE

## 2017-12-20 PROCEDURE — 36415 COLL VENOUS BLD VENIPUNCTURE: CPT | Performed by: INTERNAL MEDICINE

## 2017-12-20 PROCEDURE — 83036 HEMOGLOBIN GLYCOSYLATED A1C: CPT | Performed by: INTERNAL MEDICINE

## 2017-12-20 NOTE — NURSING NOTE
"Janet Ram's goals for this visit include: Follow up Diabetes  She requests these members of her care team be copied on today's visit information: YES    PCP: Estephanie Mancia    Referring Provider:  Estephanie Mancia PA-C  [address] [place] [address]    Chief Complaint   Patient presents with     Diabetes       Initial Ht 1.638 m (5' 4.5\")  Wt 101.9 kg (224 lb 10.4 oz)  BMI 37.97 kg/m2 Estimated body mass index is 37.97 kg/(m^2) as calculated from the following:    Height as of this encounter: 1.638 m (5' 4.5\").    Weight as of this encounter: 101.9 kg (224 lb 10.4 oz).  Medication Reconciliation: complete    Do you need any medication refills at today's visit? NO    "

## 2017-12-20 NOTE — MR AVS SNAPSHOT
After Visit Summary   12/20/2017    Janet Ram    MRN: [id number]           Patient Information     Date Of Birth          1955        Visit Information        Provider Department      12/20/2017 9:00 AM Leandra Puri MD [facility]        Today's Diagnoses     Uncontrolled type 2 diabetes mellitus without complication, with long-term current use of insulin (H)    -  1    Fatigue, unspecified type          Care Instructions    Check BG before breakfast and before dinner               Follow-ups after your visit        Follow-up notes from your care team     Return in about 3 months (around 3/20/2018) for Lab Work Prior to F/U Appt.      Your next 10 appointments already scheduled     Apr 19, 2018  8:00 AM CDT   LAB with NL LAB [facility] ([facility])    [address] [facility] [id number] [phone]           Please do not eat 10-12 hours before your appointment if you are coming in fasting for labs on lipids, cholesterol, or glucose (sugar). This does not apply to pregnant women. Water, hot tea and black coffee (with nothing added) are okay. Do not drink other fluids, diet soda or chew gum.            Apr 24, 2018 10:00 AM CDT   Return Visit with Leandra Puri MD   [facility]    [address] [place] [id number] [phone]              Future tests that were ordered for you today     Open Standing Orders        Priority Remaining Interval Expires Ordered    Hemoglobin A1c Routine 4/4 Q 3 MO 12/20/2018 12/20/2017    Hemoglobin A1c POCT Routine 3/4 Q 3 MO 12/20/2018 12/20/2017          Open Future Orders        Priority Expected Expires Ordered    TSH with free T4 reflex Routine 3/20/2018 12/20/2018 12/20/2017    Albumin Random Urine Quantitative with Creat Ratio Routine 3/20/2018 12/20/2018 12/20/2017    Comprehensive metabolic panel Routine  3/20/2018 2018 2017    Hematocrit Routine 3/20/2018 2018 2017    Lipid panel reflex to direct LDL Fasting Routine 3/20/2018 2018 2017    CK total Routine 3/20/2018 2018 2017            Who to contact     If you have questions or need follow up information about today's clinic visit or your schedule please contact [facility] directly at [phone].  Normal or non-critical lab and imaging results will be communicated to you by [employer], letter or phone within 4 business days after the clinic has received the results. If you do not hear from us within 7 days, please contact the clinic through [employer] or phone. If you have a critical or abnormal lab result, we will notify you by phone as soon as possible.  Submit refill requests through [employer] or call your pharmacy and they will forward the refill request to us. Please allow 3 business days for your refill to be completed.          Additional Information About Your Visit        [employer] Information     [employer] is an electronic gateway that provides easy, online access to your medical records. With [employer], you can request a clinic appointment, read your test results, renew a prescription or communicate with your care team.     To sign up for [employer] visit the website at [email or web address]   You will be asked to enter the access code listed below, as well as some personal information. Please follow the directions to create your username and password.     Your access code is: [email or web address]  Expires: 3/20/2018 10:10 AM     Your access code will  in 90 days. If you need help or a new code, please contact your [facility] or call [phone] for assistance.        Care EveryWhere ID     This is your Care EveryWhere ID. This could be used by other organizations to access your [facility] medical records  [id number]        Your Vitals Were     Pulse Height BMI (Body  "Mass Index)             76 1.638 m (5' 4.5\") 37.97 kg/m2          Blood Pressure from Last 3 Encounters:   12/20/17 160/84   08/29/17 132/84   03/27/17 136/72    Weight from Last 3 Encounters:   12/20/17 101.9 kg (224 lb 10.4 oz)   08/29/17 102.6 kg (226 lb 4.8 oz)   03/27/17 101.2 kg (223 lb)              We Performed the Following     Hemoglobin A1c POCT          Today's Medication Changes          These changes are accurate as of: 12/20/17 10:10 AM.  If you have any questions, ask your nurse or doctor.               Start taking these medicines.        Dose/Directions    dulaglutide 0.75 MG/0.5ML pen   Commonly known as:  TRULICITY   Used for:  Uncontrolled type 2 diabetes mellitus without complication, with long-term current use of insulin (H)   Started by:  Leandra Puri MD        Dose:  0.75 mg   Inject 0.75 mg Subcutaneous every 7 days   Quantity:  6 mL   Refills:  3       insulin degludec 100 UNIT/ML pen   Commonly known as:  TRESIBA   Used for:  Uncontrolled type 2 diabetes mellitus without complication, with long-term current use of insulin (H)   Started by:  Leandra Puri MD        Dose:  25 Units   Inject 25 Units Subcutaneous daily   Quantity:  25 mL   Refills:  3         Stop taking these medicines if you haven't already. Please contact your care team if you have questions.     insulin detemir 100 UNIT/ML injection   Commonly known as:  LEVEMIR FLEXPEN/FLEXTOUCH   Stopped by:  Leandra Puri MD                Where to get your medicines      These medications were sent to [facility] [address] [place] [address] [facility]  [address] [facility] [address]     Phone:  [phone]     dulaglutide 0.75 MG/0.5ML pen    insulin degludec 100 UNIT/ML pen                Primary Care Provider Office Phone # Fax #    Estephanie Mancia PA-C [phone] [address] [place] [address]        Equal Access to Services     LUIS ANNE AH: Hadii " clay Nava, waasifda luprasanthadaha, qaybta kaalmada danihussein, waxjesse jus hernandesjosylubna rodrigez yelena. So [facility] [phone].    ATENCIÓN: Si habla español, tiene a vides disposición servicios gratuitos de asistencia lingüística. Llame al [phone].    We comply with applicable federal civil rights laws and [place] laws. We do not discriminate on the basis of race, color, national origin, age, disability, sex, sexual orientation, or gender identity.            Thank you!     Thank you for choosing [facility]  for your care. Our goal is always to provide you with excellent care. Hearing back from our patients is one way we can continue to improve our services. Please take a few minutes to complete the written survey that you may receive in the mail after your visit with us. Thank you!             Your Updated Medication List - Protect others around you: Learn how to safely use, store and throw away your medicines at www.disposemymeds.org.          This list is accurate as of: 12/20/17 10:10 AM.  Always use your most recent med list.                   Brand Name Dispense Instructions for use Diagnosis    Alcohol Wipes Pads     100 each    1 each daily Use to test blood sugar 1 times dailyPlease give patient meter, test trips and lancets as covered by insurance.    Type 2 diabetes, HbA1c goal < 7% (H), Uncontrolled diabetes mellitus type 2 without complications (H)       aspirin 81 MG tablet      1 TABLET DAILY        atorvastatin 40 MG tablet    LIPITOR    90 tablet    TAKE ONE TABLET BY MOUTH ONCE DAILY    Uncontrolled type 2 diabetes mellitus without complication, with long-term current use of insulin (H), Hypertension goal BP (blood pressure) < 140/90, Hyperlipidemia LDL goal <100       B-D U/F 31G X 8 MM   Generic drug:  insulin pen needle     100 each    USE ONE  ONCE DAILY OR  AS  DIRECTED    Type 2 diabetes mellitus, uncontrolled (H)       bisacodyl 5 MG EC tablet    DULCOLAX      Take 5 mg by mouth daily as needed for constipation (she took 3 tablets a week ago Friday) Reported on 3/27/2017        blood glucose monitoring meter device kit    no brand specified    1 kit    Use to test blood sugar 1 times daily or as directed. Please give patient meter, test trips and lancets as covered by insurance.    Type 2 diabetes, HbA1c goal < 7% (H), Uncontrolled diabetes mellitus type 2 without complications (H)       blood glucose monitoring test strip    no brand specified    100 each    Use to test blood sugar 1 times daily or as directed. Please give patient meter, test trips and lancets as covered by insurance.    Type 2 diabetes, HbA1c goal < 7% (H), Uncontrolled diabetes mellitus type 2 without complications (H)       dulaglutide 0.75 MG/0.5ML pen    TRULICITY    6 mL    Inject 0.75 mg Subcutaneous every 7 days    Uncontrolled type 2 diabetes mellitus without complication, with long-term current use of insulin (H)       glipiZIDE 10 MG 24 hr tablet    GLUCOTROL XL    180 tablet    TAKE ONE TABLET BY MOUTH TWICE DAILY    Uncontrolled type 2 diabetes mellitus without complication, with long-term current use of insulin (H)       insulin degludec 100 UNIT/ML pen    TRESIBA    25 mL    Inject 25 Units Subcutaneous daily    Uncontrolled type 2 diabetes mellitus without complication, with long-term current use of insulin (H)       lisinopril 10 MG tablet    PRINIVIL/ZESTRIL    90 tablet    TAKE ONE TABLET BY MOUTH ONCE DAILY    Hyperlipidemia LDL goal <100, Hypertension goal BP (blood pressure) < 140/90       metFORMIN 1000 MG tablet    GLUCOPHAGE    180 tablet    TAKE ONE TABLET BY MOUTH TWICE DAILY WITH MEALS    Uncontrolled type 2 diabetes mellitus without complication, with long-term current use of insulin (H)       order for DME     1 Box    Equipment being ordered: needles for solostar flex pen    Type 2 diabetes, HbA1c goal < 7% (H)       polyethylene glycol powder    MIRALAX/GLYCOLAX    850 g     Take 17 g (1 capful) by mouth daily    Obstipation       senna-docusate 8.6-50 MG per tablet    SENOKOT-S;PERICOLACE    100 tablet    Take 1-2 tablets by mouth 2 times daily as needed for constipation    Obstipation       thin lancets    NO BRAND SPECIFIED    1 Box    Use to test blood sugar 1 times daily or as directed. Please give patient meter, test trips and lancets as covered by insurance.    Type 2 diabetes, HbA1c goal < 7% (H), Uncontrolled diabetes mellitus type 2 without complications (H)

## 2017-12-20 NOTE — PROGRESS NOTES
The patient is seen in consultation at the request of Dr. Estephanie Mancia for evaluation of type 2 diabetes.    Janet Ram is a 62 year old female diagnosed with gestational diabetes (diet controled) at age 32 and formally diagnosed with type 2 diabetes, in 2005. Her diabetes has been well controlled, until 2013.  Following 2013, the glycemic control gradually deteriorated. Most recent A1c was 10.8, on 12/20/17.   Since diagnosis, she has been medicated with metformin and glipizide.  She continues to take 1 g regular metformin, twice daily, and 10 mg glipizide, twice a day.  Insulin treatment was started in 2014.  Currently, she takes 18 units of Levemir at bedtime and she reports skipping the dose approximately once a week.  Prior to starting Levemir, she was treated with Lantus and she does not know the reason why the long-acting insulin was changed.    She was not taught carbohydrate counting in the past.  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  At bedtime, she snacks, quite frequently, on ice cream or sweetened yogurt.  When she is not working, she has 3 meals and she denies snacking.    The glucometer reveals a few BG numbers, checked before meals (mainly lunch), variable between 198 and 246.  Denies hypoglycemic episodes.    Diabetes complications:  Retinopathy: last eye exam March 2016 - no DR  Nephropathy: no h/o microalbuminuria, GFR in the 70s   Neuropathy: pain in her feet towards the end of the day - since ~ 2013; no numbness or tingling sensation   The lowest BG number she remembers experiencing was in the 60s.   Takes a baby ASA.     Past Medical History   Past Medical History:   Diagnosis Date     Diabetic eye exam (H) 03/12/2015    [facility]     Hypertension      Type II or unspecified type diabetes mellitus without mention of  complication, not stated as uncontrolled    Fatty liver   Cholelithiasis   No fractures   Menopause around age 45     Past Surgical [place]   Past Surgical History:   Procedure Laterality Date     COLONOSCOPY N/A 3/17/2015    Procedure: COLONOSCOPY;  Surgeon: Mike Elizondo MD;  Location:  GI     COLONOSCOPY N/A 3/18/2015    Procedure: COMBINED COLONOSCOPY, SINGLE OR MULTIPLE BIOPSY/POLYPECTOMY BY BIOPSY;  Surgeon: Mike Elizondo MD;  Location:  GI   Pineal cyst surgery at age 18    Current Medications   Prescription Medications as of 12/20/2017             glipiZIDE (GLUCOTROL XL) 10 MG 24 hr tablet TAKE ONE TABLET BY MOUTH TWICE DAILY    metFORMIN (GLUCOPHAGE) 1000 MG tablet TAKE ONE TABLET BY MOUTH TWICE DAILY WITH MEALS    lisinopril (PRINIVIL/ZESTRIL) 10 MG tablet TAKE ONE TABLET BY MOUTH ONCE DAILY    atorvastatin (LIPITOR) 40 MG tablet TAKE ONE TABLET BY MOUTH ONCE DAILY    insulin detemir (LEVEMIR FLEXPEN/FLEXTOUCH) 100 UNIT/ML injection 18 units at bedtime    thin (NO BRAND SPECIFIED) lancets Use to test blood sugar 1 times daily or as directed. Please give patient meter, test trips and lancets as covered by insurance.    blood glucose monitoring (NO BRAND SPECIFIED) test strip Use to test blood sugar 1 times daily or as directed. Please give patient meter, test trips and lancets as covered by insurance.    blood glucose monitoring (NO BRAND SPECIFIED) meter device kit Use to test blood sugar 1 times daily or as directed. Please give patient meter, test trips and lancets as covered by insurance.    Alcohol Swabs (ALCOHOL WIPES) PADS 1 each daily Use to test blood sugar 1 times dailyPlease give patient meter, test trips and lancets as covered by insurance.    B-D U/F 31G X 8 MM insulin pen needle USE ONE  ONCE DAILY OR  AS  DIRECTED    polyethylene glycol (MIRALAX/GLYCOLAX) powder Take 17 g (1 capful) by mouth daily    senna-docusate (SENOKOT-S;PERICOLACE) 8.6-50 MG per tablet Take 1-2 tablets by  mouth 2 times daily as needed for constipation    bisacodyl (DULCOLAX) 5 MG EC tablet Take 5 mg by mouth daily as needed for constipation (she took 3 tablets a week ago Friday) Reported on 3/27/2017    ORDER FOR DME Equipment being ordered: needles for solostar flex pen    ASPIRIN 81 MG OR TABS 1 TABLET DAILY          Family History   Problem Relation Age of Onset     DIABETES type 2 dx in her 60s  Mother      C.A.D. Mother      in her 60's     DIABETES type 2 dx in his 60s  Father      C.A.D. Father      in his 50's   No family h/o thyroid disease.     Social History   with 1 child. She denies smoking. She drinks alcohol once every couple of months. 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     Review of Systems   Systemic:               Longstanding fatigue; she sleeps 7-8 hrs a night and she snores; she wakes up rested in the morning  Eye:                       No eye symptoms   Angel-Laryngeal:      No angel-laryngeal symptoms, no dysphagia, no voice hoarseness, no cough      Breast:                   No breast symptoms  Cardiovascular:     No cardiovascular symptoms, no CP or palpitations   Pulmonary:            No pulmonary symptoms, no cough or SOB    Gastrointestinal:    No gastrointestinal symptoms, no diarrhea or constipation   Genitourinary:        Uses the restroom 1-3 times a night; all her life   Endocrine:             No endocrine symptoms, no heat or cold intolerance   Neurological:         no headaches, no tremor, no dizziness     Musculoskeletal:    Muscle spasms in the inner thighs - at night, once a week for the last year; no joint pain   Skin:                       No skin symptoms   Psychological:       No psychological symptoms                Vital Signs     Previous Weights:    Wt Readings from Last 10 Encounters:   12/20/17 101.9 kg (224 lb 10.4 oz)   08/29/17 102.6 kg (226 lb 4.8 oz)   03/27/17 101.2 kg (223 lb)   09/21/16 102.1 kg (225 lb)   06/07/16 102.1 kg (225  "lb)   05/18/16 100.7 kg (222 lb)   04/26/16 104.9 kg (231 lb 3.2 oz)   04/23/16 103.5 kg (228 lb 2.8 oz)   04/18/16 102.2 kg (225 lb 6.4 oz)   03/29/16 103 kg (227 lb)                   /84  Pulse 76  Ht 1.638 m (5' 4.5\")  Wt 101.9 kg (224 lb 10.4 oz)  BMI 37.97 kg/m2  Blood pressure on recheck 160/84    Physical Exam  General Appearance:  General obesity, no distress noted   Eyes:  conjutivae and extra-ocular motions are normal.                                    pupils round and reactive to light, no lid lag, no stare    HEENT:   oropharynx clear and moist, no JVD, no bruits      no thyromegaly, no palpable nodules   Cardiovascular:  regular rhythm, no murmurs, distal pulse palpable, no edema  Respiratory:       chest clear, no rales, no rhonchi   Gastrointestinal: abdomen soft, non-tender, non-distended, normal bowel sounds,   no organomegaly   Musculoskeletal: normal tone and strength  Psychiatric: affect and judgment normal  Skin: Benign abd striae   Neurological: reflexes normal and symmetric, no resting tremor,   Feet                          sensation intact to monofilament testing, skin intact     Lab Results  I reviewed prior lab results documented in Epic.  Lab Results   Component Value Date    A1C 10.4 (H) 08/29/2017    A1C 10.1 (H) 03/27/2017    A1C 9.6 (H) 12/14/2016    A1C 9.3 (H) 09/21/2016    A1C 9.6 (H) 04/21/2016     Hemoglobin   Date Value Ref Range Status   04/26/2016 12.4 11.7 - 15.7 g/dL Final     Hematocrit   Date Value Ref Range Status   04/26/2016 36.7 35.0 - 47.0 % Final     Cholesterol   Date Value Ref Range Status   08/29/2017 152 <200 mg/dL Final     Cholesterol/HDL Ratio   Date Value Ref Range Status   09/10/2015 3.2 0.0 - 5.0 Final     HDL Cholesterol   Date Value Ref Range Status   08/29/2017 46 (L) >49 mg/dL Final     LDL Cholesterol Calculated   Date Value Ref Range Status   08/29/2017 66 <100 mg/dL Final     Comment:     Desirable:       <100 mg/dl     VLDL-Cholesterol "   Date Value Ref Range Status   09/10/2015 30 0 - 30 mg/dL Final     Triglycerides   Date Value Ref Range Status   08/29/2017 201 (H) <150 mg/dL Final     Comment:     Borderline high:  150-199 mg/dl  High:             200-499 mg/dl  Very high:       >499 mg/dl       Albumin Urine mg/L   Date Value Ref Range Status   09/21/2016 6 mg/L Final     TSH   Date Value Ref Range Status   09/21/2016 3.83 0.40 - 4.00 mU/L Final     Last Basic Metabolic Panel:    Sodium   Date Value Ref Range Status   08/29/2017 142 133 - 144 mmol/L Final     Potassium   Date Value Ref Range Status   08/29/2017 4.4 3.4 - 5.3 mmol/L Final     Chloride   Date Value Ref Range Status   08/29/2017 108 94 - 109 mmol/L Final     Calcium   Date Value Ref Range Status   08/29/2017 8.9 8.5 - 10.1 mg/dL Final     Carbon Dioxide   Date Value Ref Range Status   08/29/2017 25 20 - 32 mmol/L Final     Urea Nitrogen   Date Value Ref Range Status   08/29/2017 13 7 - 30 mg/dL Final     Creatinine   Date Value Ref Range Status   08/29/2017 0.82 0.52 - 1.04 mg/dL Final     GFR Estimate   Date Value Ref Range Status   08/29/2017 70 >60 mL/min/1.7m2 Final     Comment:     Non  GFR Calc     Glucose   Date Value Ref Range Status   08/29/2017 224 (H) 70 - 99 mg/dL Final     AST   Date Value Ref Range Status   08/29/2017 14 0 - 45 U/L Final     ALT   Date Value Ref Range Status   08/29/2017 28 0 - 50 U/L Final     Albumin   Date Value Ref Range Status   08/29/2017 3.3 (L) 3.4 - 5.0 g/dL Final       Assessment     1. Type 2 diabetes, uncontrolled, with no known microvascular disease with the exception of, possible, mild diabetic neuropathy.    The patient was counseled on the etiology of type 2 diabetes, the natural progression towards insulin dependence, the difference between long and short acting insulin, the correct administration of short-acting insulin based on the carbohydrate intake, carbohydrate counting, hypoglycemic symptoms and correction of  hypoglycemia.    We might be able to buy some time before starting short-acting insulin.  I recommended to continue metformin and glipizide and add Trulicity, at 0.75 mg weekly.  I also advised her to discontinue Levemir and start Tresiba, at 25 units daily.  The plan is to check a hemoglobin A1c in 3 months and, if persistently elevated, consider short acting insulin.  The patient was counseled on GLP-1 agonist side effects: Nausea, vomiting, GI symptoms, headaches, the rare risk of pancreatitis and the questionable risk of medullary thyroid cancer.   Plan:   - schedule labs in 3 months.    2. Hypertension, uncontrolled.   She is going to check her BP at home and let us know if the numbers are consistently above 140/90.    3. Longstanding fatigue   F/up TSH.      Orders Placed This Encounter   Procedures     Hemoglobin A1c POCT     Albumin Random Urine Quantitative with Creat Ratio     Comprehensive metabolic panel     Hematocrit     Lipid panel reflex to direct LDL Fasting     CK total     TSH with free T4 reflex     Hemoglobin A1c

## 2017-12-20 NOTE — LETTER
12/20/2017      RE: Janet Ram  [address]   [place] [address]     Dear Colleague,    Thank you for referring your patient, Janet Ram, to the [facility]. Please see a copy of my visit note below.      The patient is seen in consultation at the request of Dr. Estephanie Mancia for evaluation of type 2 diabetes.    Janet Ram is a 62 year old female diagnosed with gestational diabetes (diet controled) at age 32 and formally diagnosed with type 2 diabetes, in 2005. Her diabetes has been well controlled, until 2013.  Following 2013, the glycemic control gradually deteriorated. Most recent A1c was 10.8, on 12/20/17.   Since diagnosis, she has been medicated with metformin and glipizide.  She continues to take 1 g regular metformin, twice daily, and 10 mg glipizide, twice a day.  Insulin treatment was started in 2014.  Currently, she takes 18 units of Levemir at bedtime and she reports skipping the dose approximately once a week.  Prior to starting Levemir, she was treated with Lantus and she does not know the reason why the long-acting insulin was changed.    She was not taught carbohydrate counting in the past.  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  At bedtime, she snacks, quite frequently, on ice cream or sweetened yogurt.  When she is not working, she has 3 meals and she denies snacking.    The glucometer reveals a few BG numbers, checked before meals (mainly lunch), variable between 198 and 246.  Denies hypoglycemic episodes.    Diabetes complications:  Retinopathy: last eye exam March 2016 - no DR  Nephropathy: no h/o microalbuminuria, GFR in the 70s   Neuropathy: pain in her feet towards the end of the day - since ~ 2013; no numbness or tingling sensation   The lowest BG number she remembers experiencing was  in the 60s.   Takes a baby ASA.     Past Medical History   Past Medical History:   Diagnosis Date     Diabetic eye exam (H) 03/12/2015    [facility]     Hypertension      Type II or unspecified type diabetes mellitus without mention of complication, not stated as uncontrolled    Fatty liver   Cholelithiasis   No fractures   Menopause around age 45     Past Surgical [facility]story   Past Surgical History:   Procedure Laterality Date     COLONOSCOPY N/A 3/17/2015    Procedure: COLONOSCOPY;  Surgeon: Mike Elizondo MD;  Location:  GI     COLONOSCOPY N/A 3/18/2015    Procedure: COMBINED COLONOSCOPY, SINGLE OR MULTIPLE BIOPSY/POLYPECTOMY BY BIOPSY;  Surgeon: Mike Elizondo MD;  Location:  GI   Pineal cyst surgery at age 18    Current Medications   Prescription Medications as of 12/20/2017             glipiZIDE (GLUCOTROL XL) 10 MG 24 hr tablet TAKE ONE TABLET BY MOUTH TWICE DAILY    metFORMIN (GLUCOPHAGE) 1000 MG tablet TAKE ONE TABLET BY MOUTH TWICE DAILY WITH MEALS    lisinopril (PRINIVIL/ZESTRIL) 10 MG tablet TAKE ONE TABLET BY MOUTH ONCE DAILY    atorvastatin (LIPITOR) 40 MG tablet TAKE ONE TABLET BY MOUTH ONCE DAILY    insulin detemir (LEVEMIR FLEXPEN/FLEXTOUCH) 100 UNIT/ML injection 18 units at bedtime    thin (NO BRAND SPECIFIED) lancets Use to test blood sugar 1 times daily or as directed. Please give patient meter, test trips and lancets as covered by insurance.    blood glucose monitoring (NO BRAND SPECIFIED) test strip Use to test blood sugar 1 times daily or as directed. Please give patient meter, test trips and lancets as covered by insurance.    blood glucose monitoring (NO BRAND SPECIFIED) meter device kit Use to test blood sugar 1 times daily or as directed. Please give patient meter, test trips and lancets as covered by insurance.    Alcohol Swabs (ALCOHOL WIPES) PADS 1 each daily Use to test blood sugar 1 times dailyPlease give patient meter, test trips and lancets as covered by  insurance.    B-D U/F 31G X 8 MM insulin pen needle USE ONE  ONCE DAILY OR  AS  DIRECTED    polyethylene glycol (MIRALAX/GLYCOLAX) powder Take 17 g (1 capful) by mouth daily    senna-docusate (SENOKOT-S;PERICOLACE) 8.6-50 MG per tablet Take 1-2 tablets by mouth 2 times daily as needed for constipation    bisacodyl (DULCOLAX) 5 MG EC tablet Take 5 mg by mouth daily as needed for constipation (she took 3 tablets a week ago Friday) Reported on 3/27/2017    ORDER FOR DME Equipment being ordered: needles for solostar flex pen    ASPIRIN 81 MG OR TABS 1 TABLET DAILY          Family History   Problem Relation Age of Onset     DIABETES type 2 dx in her 60s  Mother      C.A.D. Mother      in her 60's     DIABETES type 2 dx in his 60s  Father      C.A.D. Father      in his 50's   No family h/o thyroid disease.     Social History   with 1 child. She denies smoking. She drinks alcohol once every couple of months. 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     Review of Systems   Systemic:               Longstanding fatigue; she sleeps 7-8 hrs a night and she snores; she wakes up rested in the morning  Eye:                       No eye symptoms   Angel-Laryngeal:      No angel-laryngeal symptoms, no dysphagia, no voice hoarseness, no cough      Breast:                   No breast symptoms  Cardiovascular:     No cardiovascular symptoms, no CP or palpitations   Pulmonary:            No pulmonary symptoms, no cough or SOB    Gastrointestinal:    No gastrointestinal symptoms, no diarrhea or constipation   Genitourinary:        Uses the restroom 1-3 times a night; all her life   Endocrine:             No endocrine symptoms, no heat or cold intolerance   Neurological:         no headaches, no tremor, no dizziness     Musculoskeletal:    Muscle spasms in the inner thighs - at night, once a week for the last year; no joint pain   Skin:                       No skin symptoms   Psychological:       No  "psychological symptoms                Vital Signs     Previous Weights:    Wt Readings from Last 10 Encounters:   12/20/17 101.9 kg (224 lb 10.4 oz)   08/29/17 102.6 kg (226 lb 4.8 oz)   03/27/17 101.2 kg (223 lb)   09/21/16 102.1 kg (225 lb)   06/07/16 102.1 kg (225 lb)   05/18/16 100.7 kg (222 lb)   04/26/16 104.9 kg (231 lb 3.2 oz)   04/23/16 103.5 kg (228 lb 2.8 oz)   04/18/16 102.2 kg (225 lb 6.4 oz)   03/29/16 103 kg (227 lb)                   /84  Pulse 76  Ht 1.638 m (5' 4.5\")  Wt 101.9 kg (224 lb 10.4 oz)  BMI 37.97 kg/m2  Blood pressure on recheck 160/84    Physical Exam  General Appearance:  General obesity, no distress noted   Eyes:  conjutivae and extra-ocular motions are normal.                                    pupils round and reactive to light, no lid lag, no stare    HEENT:   oropharynx clear and moist, no JVD, no bruits      no thyromegaly, no palpable nodules   Cardiovascular:  regular rhythm, no murmurs, distal pulse palpable, no edema  Respiratory:       chest clear, no rales, no rhonchi   Gastrointestinal: abdomen soft, non-tender, non-distended, normal bowel sounds,   no organomegaly   Musculoskeletal: normal tone and strength  Psychiatric: affect and judgment normal  Skin: Benign abd striae   Neurological: reflexes normal and symmetric, no resting tremor,   Feet                          sensation intact to monofilament testing, skin intact     Lab Results  I reviewed prior lab results documented in Epic.  Lab Results   Component Value Date    A1C 10.4 (H) 08/29/2017    A1C 10.1 (H) 03/27/2017    A1C 9.6 (H) 12/14/2016    A1C 9.3 (H) 09/21/2016    A1C 9.6 (H) 04/21/2016     Hemoglobin   Date Value Ref Range Status   04/26/2016 12.4 11.7 - 15.7 g/dL Final     Hematocrit   Date Value Ref Range Status   04/26/2016 36.7 35.0 - 47.0 % Final     Cholesterol   Date Value Ref Range Status   08/29/2017 152 <200 mg/dL Final     Cholesterol/HDL Ratio   Date Value Ref Range Status   09/10/2015 " 3.2 0.0 - 5.0 Final     HDL Cholesterol   Date Value Ref Range Status   08/29/2017 46 (L) >49 mg/dL Final     LDL Cholesterol Calculated   Date Value Ref Range Status   08/29/2017 66 <100 mg/dL Final     Comment:     Desirable:       <100 mg/dl     VLDL-Cholesterol   Date Value Ref Range Status   09/10/2015 30 0 - 30 mg/dL Final     Triglycerides   Date Value Ref Range Status   08/29/2017 201 (H) <150 mg/dL Final     Comment:     Borderline high:  150-199 mg/dl  High:             200-499 mg/dl  Very high:       >499 mg/dl       Albumin Urine mg/L   Date Value Ref Range Status   09/21/2016 6 mg/L Final     TSH   Date Value Ref Range Status   09/21/2016 3.83 0.40 - 4.00 mU/L Final     Last Basic Metabolic Panel:    Sodium   Date Value Ref Range Status   08/29/2017 142 133 - 144 mmol/L Final     Potassium   Date Value Ref Range Status   08/29/2017 4.4 3.4 - 5.3 mmol/L Final     Chloride   Date Value Ref Range Status   08/29/2017 108 94 - 109 mmol/L Final     Calcium   Date Value Ref Range Status   08/29/2017 8.9 8.5 - 10.1 mg/dL Final     Carbon Dioxide   Date Value Ref Range Status   08/29/2017 25 20 - 32 mmol/L Final     Urea Nitrogen   Date Value Ref Range Status   08/29/2017 13 7 - 30 mg/dL Final     Creatinine   Date Value Ref Range Status   08/29/2017 0.82 0.52 - 1.04 mg/dL Final     GFR Estimate   Date Value Ref Range Status   08/29/2017 70 >60 mL/min/1.7m2 Final     Comment:     Non  GFR Calc     Glucose   Date Value Ref Range Status   08/29/2017 224 (H) 70 - 99 mg/dL Final     AST   Date Value Ref Range Status   08/29/2017 14 0 - 45 U/L Final     ALT   Date Value Ref Range Status   08/29/2017 28 0 - 50 U/L Final     Albumin   Date Value Ref Range Status   08/29/2017 3.3 (L) 3.4 - 5.0 g/dL Final       Assessment     1. Type 2 diabetes, uncontrolled, with no known microvascular disease with the exception of, possible, mild diabetic neuropathy.    The patient was counseled on the etiology of type  2 diabetes, the natural progression towards insulin dependence, the difference between long and short acting insulin, the correct administration of short-acting insulin based on the carbohydrate intake, carbohydrate counting, hypoglycemic symptoms and correction of hypoglycemia.    We might be able to buy some time before starting short-acting insulin.  I recommended to continue metformin and glipizide and add Trulicity, at 0.75 mg weekly.  I also advised her to discontinue Levemir and start Tresiba, at 25 units daily.  The plan is to check a hemoglobin A1c in 3 months and, if persistently elevated, consider short acting insulin.  The patient was counseled on GLP-1 agonist side effects: Nausea, vomiting, GI symptoms, headaches, the rare risk of pancreatitis and the questionable risk of medullary thyroid cancer.   Plan:   - schedule labs in 3 months.    2. Hypertension, uncontrolled.   She is going to check her BP at home and let us know if the numbers are consistently above 140/90.    3. Longstanding fatigue   F/up TSH.      Orders Placed This Encounter   Procedures     Hemoglobin A1c POCT     Albumin Random Urine Quantitative with Creat Ratio     Comprehensive metabolic panel     Hematocrit     Lipid panel reflex to direct LDL Fasting     CK total     TSH with free T4 reflex     Hemoglobin A1c              Again, thank you for allowing me to participate in the care of your patient.      Sincerely,    Leandra Puri MD

## 2018-02-22 ENCOUNTER — HOSPITAL ENCOUNTER (OUTPATIENT)
Dept: MAMMOGRAPHY | Facility: CLINIC | Age: 63
Discharge: HOME | End: 2018-02-22
Attending: PHYSICIAN ASSISTANT | Admitting: PHYSICIAN ASSISTANT
Payer: COMMERCIAL

## 2018-02-22 DIAGNOSIS — Z12.31 VISIT FOR SCREENING MAMMOGRAM: ICD-10-CM

## 2018-02-22 PROCEDURE — 77067 SCR MAMMO BI INCL CAD: CPT

## 2018-04-18 ENCOUNTER — TRANSFERRED RECORDS (OUTPATIENT)
Dept: HEALTH INFORMATION MANAGEMENT | Facility: CLINIC | Age: 63
End: 2018-04-18

## 2018-04-19 DIAGNOSIS — R53.83 FATIGUE, UNSPECIFIED TYPE: ICD-10-CM

## 2018-04-19 LAB
ALBUMIN SERPL-MCNC: 3.6 G/DL (ref 3.4–5)
ALP SERPL-CCNC: 117 U/L (ref 40–150)
ALT SERPL W P-5'-P-CCNC: 36 U/L (ref 0–50)
ANION GAP SERPL CALCULATED.3IONS-SCNC: 8 MMOL/L (ref 3–14)
AST SERPL W P-5'-P-CCNC: 21 U/L (ref 0–45)
BILIRUB SERPL-MCNC: 1.6 MG/DL (ref 0.2–1.3)
BUN SERPL-MCNC: 15 MG/DL (ref 7–30)
CALCIUM SERPL-MCNC: 8.6 MG/DL (ref 8.5–10.1)
CHLORIDE SERPL-SCNC: 104 MMOL/L (ref 94–109)
CHOLEST SERPL-MCNC: 128 MG/DL
CK SERPL-CCNC: 40 U/L (ref 30–225)
CO2 SERPL-SCNC: 26 MMOL/L (ref 20–32)
CREAT SERPL-MCNC: 0.74 MG/DL (ref 0.52–1.04)
GFR SERPL CREATININE-BSD FRML MDRD: 79 ML/MIN/1.7M2
GLUCOSE SERPL-MCNC: 231 MG/DL (ref 70–99)
HBA1C MFR BLD: 10.5 % (ref 0–6.4)
HCT VFR BLD AUTO: 42.4 % (ref 35–47)
HDLC SERPL-MCNC: 41 MG/DL
LDLC SERPL CALC-MCNC: 30 MG/DL
NONHDLC SERPL-MCNC: 87 MG/DL
POTASSIUM SERPL-SCNC: 4.6 MMOL/L (ref 3.4–5.3)
PROT SERPL-MCNC: 7.2 G/DL (ref 6.8–8.8)
SODIUM SERPL-SCNC: 138 MMOL/L (ref 133–144)
TRIGL SERPL-MCNC: 287 MG/DL

## 2018-04-19 PROCEDURE — 80053 COMPREHEN METABOLIC PANEL: CPT | Performed by: INTERNAL MEDICINE

## 2018-04-19 PROCEDURE — 83036 HEMOGLOBIN GLYCOSYLATED A1C: CPT | Performed by: INTERNAL MEDICINE

## 2018-04-19 PROCEDURE — 82550 ASSAY OF CK (CPK): CPT | Performed by: INTERNAL MEDICINE

## 2018-04-19 PROCEDURE — 84439 ASSAY OF FREE THYROXINE: CPT | Performed by: INTERNAL MEDICINE

## 2018-04-19 PROCEDURE — 80061 LIPID PANEL: CPT | Performed by: INTERNAL MEDICINE

## 2018-04-19 PROCEDURE — 85014 HEMATOCRIT: CPT | Performed by: INTERNAL MEDICINE

## 2018-04-19 PROCEDURE — 36415 COLL VENOUS BLD VENIPUNCTURE: CPT | Performed by: INTERNAL MEDICINE

## 2018-04-19 PROCEDURE — 84443 ASSAY THYROID STIM HORMONE: CPT | Performed by: INTERNAL MEDICINE

## 2018-04-20 LAB
MICROALBUMIN UR-MCNC: NORMAL MG/L
MICROALBUMIN/CREAT UR: NORMAL MG/G CR (ref 0–25)
T4 FREE SERPL-MCNC: 0.95 NG/DL (ref 0.76–1.46)
TSH SERPL DL<=0.005 MIU/L-ACNC: 6.27 MU/L (ref 0.4–4)

## 2018-04-24 ENCOUNTER — OFFICE VISIT (OUTPATIENT)
Dept: ENDOCRINOLOGY | Facility: CLINIC | Age: 63
End: 2018-04-24
Payer: COMMERCIAL

## 2018-04-24 VITALS
HEART RATE: 71 BPM | OXYGEN SATURATION: 97 % | HEIGHT: 65 IN | BODY MASS INDEX: 37.02 KG/M2 | WEIGHT: 222.22 LBS | DIASTOLIC BLOOD PRESSURE: 82 MMHG | SYSTOLIC BLOOD PRESSURE: 144 MMHG

## 2018-04-24 DIAGNOSIS — I10 HYPERTENSION GOAL BP (BLOOD PRESSURE) < 140/90: ICD-10-CM

## 2018-04-24 DIAGNOSIS — R79.89 HIGH THYROID STIMULATING HORMONE (TSH) LEVEL: ICD-10-CM

## 2018-04-24 DIAGNOSIS — E78.5 HYPERLIPIDEMIA LDL GOAL <100: ICD-10-CM

## 2018-04-24 PROCEDURE — 99214 OFFICE O/P EST MOD 30 MIN: CPT | Performed by: INTERNAL MEDICINE

## 2018-04-24 RX ORDER — LISINOPRIL 10 MG/1
10 TABLET ORAL 2 TIMES DAILY
Qty: 180 TABLET | Refills: 3 | Status: SHIPPED | OUTPATIENT
Start: 2018-04-24 | End: 2018-07-03 | Stop reason: DRUGHIGH

## 2018-04-24 NOTE — PROGRESS NOTES
The patient is seen in f/up for evaluation of type 2 diabetes.    Janet Ram is a 63 year old female diagnosed with gestational diabetes (diet controled) at age 32 and formally diagnosed with type 2 diabetes, in 2005. Her diabetes has been well controlled, until 2013.  Following 2013, the glycemic control gradually deteriorated. A1c was 10.8, on 12/20/17.  It was 10.5% today.  Since diagnosis, she has been medicated with metformin and glipizide.  She continues to take 1 g regular metformin, twice daily, and 10 mg glipizide, twice a day.  Insulin treatment was started in 2014, initially with Lantus, later with Levemir. At her first appointment here, in 12/2017, Levemir was discontinued and changed with 25 U of Tresiba and Trulicity was added, at 0.75 mg weekly.    She tolerates the Trulicity with no side effects.  The medication has been effective in curbing her appetite.  Her weight is down 2 pounds.  She denies experiencing hypoglycemic episodes since her last visit here.  The glucometer reveals that she checks her blood glucose 1.3 times daily, mainly before breakfast or before lunch.  Occasionally, she might check her blood glucose at bedtime.  Average blood glucose is 219 with a standard deviation of 22 and a range variable between 166 and 259.  In the last month, all the blood glucose numbers were in the 200s, with the exception of 1 isolated morning blood sugar of 166.  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 When she is not working, she has 3 meals and she denies snacking.  At 5 AM in the morning, when she wakes up, she frequently has a Slim fast and a toast prior to going to work.  She checks her blood glucose around 10:30, while she is at work.    At the end of March, she was sick with what appears to be viral gastroenteritis, for 2  "weeks.    Her blood pressure was slightly elevated in the clinic.  The patient attributes this to \"white coat hypertension\".  She has not been checking her blood pressure at home (she does have a cuff).    Diabetes complications:  Retinopathy: last eye exam April 2018 - no DR  Nephropathy: no h/o microalbuminuria, GFR in the 70s   Neuropathy: pain in her feet towards the end of the day - since ~ 2013; no numbness or tingling sensation   The lowest BG number she remembers experiencing was in the 60s.   Takes a baby ASA and 40 mg atorvastatin.     Past Medical History   Past Medical History:   Diagnosis Date     Diabetic eye exam (H) 03/12/2015    [facility]     Hypertension      Type II or unspecified type diabetes mellitus without mention of complication, not stated as uncontrolled    Fatty liver   Cholelithiasis   No fractures   Menopause around age 45     Past Surgical [facility]   Past Surgical History:   Procedure Laterality Date     COLONOSCOPY N/A 3/17/2015    Procedure: COLONOSCOPY;  Surgeon: Mike Elizondo MD;  Location:  GI     COLONOSCOPY N/A 3/18/2015    Procedure: COMBINED COLONOSCOPY, SINGLE OR MULTIPLE BIOPSY/POLYPECTOMY BY BIOPSY;  Surgeon: Mike Elizondo MD;  Location:  GI   Pineal cyst surgery at age 18    Current Medications   Prescription Medications as of 4/24/2018             Alcohol Swabs (ALCOHOL WIPES) PADS 1 each daily Use to test blood sugar 1 times dailyPlease give patient meter, test trips and lancets as covered by insurance.    ASPIRIN 81 MG OR TABS 1 TABLET DAILY    atorvastatin (LIPITOR) 40 MG tablet TAKE ONE TABLET BY MOUTH ONCE DAILY    B-D U/F 31G X 8 MM insulin pen needle USE ONE  ONCE DAILY OR  AS  DIRECTED    bisacodyl (DULCOLAX) 5 MG EC tablet Take 5 mg by mouth daily as needed for constipation (she took 3 tablets a week ago Friday) Reported on 3/27/2017    blood glucose monitoring (NO BRAND SPECIFIED) meter device kit Use to test blood sugar 1 times daily or " as directed. Please give patient meter, test trips and lancets as covered by insurance.    blood glucose monitoring (NO BRAND SPECIFIED) test strip Use to test blood sugar 1 times daily or as directed. Please give patient meter, test trips and lancets as covered by insurance.    dulaglutide (TRULICITY) 0.75 MG/0.5ML pen Inject 0.75 mg Subcutaneous every 7 days    glipiZIDE (GLUCOTROL XL) 10 MG 24 hr tablet TAKE ONE TABLET BY MOUTH TWICE DAILY    insulin degludec (TRESIBA) 100 UNIT/ML pen Inject 25 Units Subcutaneous daily    lisinopril (PRINIVIL/ZESTRIL) 10 MG tablet TAKE ONE TABLET BY MOUTH ONCE DAILY    metFORMIN (GLUCOPHAGE) 1000 MG tablet TAKE ONE TABLET BY MOUTH TWICE DAILY WITH MEALS    ORDER FOR DME Equipment being ordered: needles for solostar flex pen    polyethylene glycol (MIRALAX/GLYCOLAX) powder Take 17 g (1 capful) by mouth daily    senna-docusate (SENOKOT-S;PERICOLACE) 8.6-50 MG per tablet Take 1-2 tablets by mouth 2 times daily as needed for constipation    thin (NO BRAND SPECIFIED) lancets Use to test blood sugar 1 times daily or as directed. Please give patient meter, test trips and lancets as covered by insurance.          Family History   Problem Relation Age of Onset     DIABETES type 2 dx in her 60s  Mother      C.A.D. Mother      in her 60's     DIABETES type 2 dx in his 60s  Father      C.A.D. Father      in his 50's   No family h/o thyroid disease.     Social History   with 1 child. She denies smoking. She drinks alcohol once every couple of months. 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     Review of Systems   Systemic:               Longstanding fatigue; she sleeps 7-8 hrs a night and she snores; she wakes up rested in the morning  Eye:                       No eye symptoms   Angel-Laryngeal:      No angel-laryngeal symptoms, no dysphagia, no voice hoarseness, no cough      Breast:                   No breast symptoms  Cardiovascular:     No cardiovascular  "symptoms, no CP or palpitations   Pulmonary:            No pulmonary symptoms, no cough or SOB    Gastrointestinal:    No gastrointestinal symptoms, no diarrhea or constipation   Genitourinary:        Uses the restroom 1-3 times a night; all her life   Endocrine:             Occasional chills noticed over wintertime   Neurological:         no headaches, no tremor, no dizziness     Musculoskeletal:    Muscle spasms in the inner thighs - at night, once a week for the last year; no joint pain   Skin:                       No skin symptoms, no hair loss   Psychological:       No psychological symptoms                Vital Signs     Previous Weights:    Wt Readings from Last 10 Encounters:   04/24/18 100.8 kg (222 lb 3.6 oz)   12/20/17 101.9 kg (224 lb 10.4 oz)   08/29/17 102.6 kg (226 lb 4.8 oz)   03/27/17 101.2 kg (223 lb)   09/21/16 102.1 kg (225 lb)   06/07/16 102.1 kg (225 lb)   05/18/16 100.7 kg (222 lb)   04/26/16 104.9 kg (231 lb 3.2 oz)   04/23/16 103.5 kg (228 lb 2.8 oz)   04/18/16 102.2 kg (225 lb 6.4 oz)                   /82  Pulse 71  Ht 1.638 m (5' 4.5\")  Wt 100.8 kg (222 lb 3.6 oz)  SpO2 97%  BMI 37.56 kg/m2  Blood pressure on recheck 149/84 pulse 71     Physical Exam  General Appearance:  General obesity, no distress noted   Eyes:  conjutivae and extra-ocular motions are normal.                                    pupils round and reactive to light, no lid lag, no stare    HEENT:   oropharynx clear and moist, no JVD, no bruits      no thyromegaly, no palpable nodules   Cardiovascular:  regular rhythm, no murmurs, distal pulse palpable, no edema  Respiratory:       chest clear, no rales, no rhonchi   Musculoskeletal: normal tone and strength  Psychiatric: affect and judgment normal  Skin: Benign abd striae   Neurological: reflexes normal and symmetric, no resting tremor     Lab Results  I reviewed prior lab results documented in Epic.  Lab Results   Component Value Date    A1C 10.5 (H) " 04/19/2018    A1C 10.8 12/20/2017    A1C 10.4 (H) 08/29/2017    A1C 10.1 (H) 03/27/2017    A1C 9.6 (H) 12/14/2016     Hemoglobin   Date Value Ref Range Status   04/26/2016 12.4 11.7 - 15.7 g/dL Final     Hematocrit   Date Value Ref Range Status   04/19/2018 42.4 35.0 - 47.0 % Final     Cholesterol   Date Value Ref Range Status   04/19/2018 128 <200 mg/dL Final     Cholesterol/HDL Ratio   Date Value Ref Range Status   09/10/2015 3.2 0.0 - 5.0 Final     HDL Cholesterol   Date Value Ref Range Status   04/19/2018 41 (L) >49 mg/dL Final     LDL Cholesterol Calculated   Date Value Ref Range Status   04/19/2018 30 <100 mg/dL Final     Comment:     Desirable:       <100 mg/dl     VLDL-Cholesterol   Date Value Ref Range Status   09/10/2015 30 0 - 30 mg/dL Final     Triglycerides   Date Value Ref Range Status   04/19/2018 287 (H) <150 mg/dL Final     Comment:     Borderline high:  150-199 mg/dl  High:             200-499 mg/dl  Very high:       >499 mg/dl       Albumin Urine mg/L   Date Value Ref Range Status   04/19/2018 Canceled, Test credited mg/L Final     Comment:     Specimen not received  SPOKE TO BOB SANDOVAL AT  LAB, NO SAMPLE.  ORDER PLACED INTO FUTURE 4.20.2018   0830 [facility]       TSH   Date Value Ref Range Status   04/19/2018 6.27 (H) 0.40 - 4.00 mU/L Final     Last Basic Metabolic Panel:    Sodium   Date Value Ref Range Status   04/19/2018 138 133 - 144 mmol/L Final     Potassium   Date Value Ref Range Status   04/19/2018 4.6 3.4 - 5.3 mmol/L Final     Chloride   Date Value Ref Range Status   04/19/2018 104 94 - 109 mmol/L Final     Calcium   Date Value Ref Range Status   04/19/2018 8.6 8.5 - 10.1 mg/dL Final     Carbon Dioxide   Date Value Ref Range Status   04/19/2018 26 20 - 32 mmol/L Final     Urea Nitrogen   Date Value Ref Range Status   04/19/2018 15 7 - 30 mg/dL Final     Creatinine   Date Value Ref Range Status   04/19/2018 0.74 0.52 - 1.04 mg/dL Final     GFR Estimate   Date Value Ref Range Status    04/19/2018 79 >60 mL/min/1.7m2 Final     Comment:     Non  GFR Calc     Glucose   Date Value Ref Range Status   04/19/2018 231 (H) 70 - 99 mg/dL Final     AST   Date Value Ref Range Status   04/19/2018 21 0 - 45 U/L Final     ALT   Date Value Ref Range Status   04/19/2018 36 0 - 50 U/L Final     Albumin   Date Value Ref Range Status   04/19/2018 3.6 3.4 - 5.0 g/dL Final       Assessment     1. Type 2 diabetes, uncontrolled, with no known microvascular disease with the exception of, possible, mild diabetic neuropathy.    Recommendations:  Increase the dose of Tresiba to 30 units  Increase the dose of Trulicity to 1.5 mg weekly  Check blood glucose in the morning, fasting, before dinner and before lunch  Have the meter downloaded in the clinic in 2 weeks    2. Hypertension, uncontrolled.   She is going to check her BP at home and let us know if the numbers are consistently above 140/90.    3.  High normal TSH on recent labs from last week.  In March, the patient was sick with what appears to be viral gastroenteritis.  Clinically, she does not endorse signs or symptoms suggestive of hypothyroidism.  I recommended to have the thyroid hormone levels rechecked at her follow-up appointment, together with the antithyroid antibodies.    4.  Hypercholesterolemia, controlled on statin.     Orders Placed This Encounter   Procedures     TSH     T4 free     Anti thyroglobulin antibody     Thyroid peroxidase antibody

## 2018-04-24 NOTE — LETTER
4/24/2018         RE: Janet Ram  [address] [address]   [place] [address]        Dear Colleague,    Thank you for referring your patient, Janet Ram, to the [facility]. Please see a copy of my visit note below.      The patient is seen in f/up for evaluation of type 2 diabetes.    Janet Ram is a 63 year old female diagnosed with gestational diabetes (diet controled) at age 32 and formally diagnosed with type 2 diabetes, in 2005. Her diabetes has been well controlled, until 2013.  Following 2013, the glycemic control gradually deteriorated. A1c was 10.8, on 12/20/17.  It was 10.5% today.  Since diagnosis, she has been medicated with metformin and glipizide.  She continues to take 1 g regular metformin, twice daily, and 10 mg glipizide, twice a day.  Insulin treatment was started in 2014, initially with Lantus, later with Levemir. At her first appointment here, in 12/2017, Levemir was discontinued and changed with 25 U of Tresiba and Trulicity was added, at 0.75 mg weekly.    She tolerates the Trulicity with no side effects.  The medication has been effective in curbing her appetite.  Her weight is down 2 pounds.  She denies experiencing hypoglycemic episodes since her last visit here.  The glucometer reveals that she checks her blood glucose 1.3 times daily, mainly before breakfast or before lunch.  Occasionally, she might check her blood glucose at bedtime.  Average blood glucose is 219 with a standard deviation of 22 and a range variable between 166 and 259.  In the last month, all the blood glucose numbers were in the 200s, with the exception of 1 isolated morning blood sugar of 166.  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 When she is not working, she has 3 meals and she denies snacking.  At 5 AM in the morning,  "when she wakes up, she frequently has a Slim fast and a toast prior to going to work.  She checks her blood glucose around 10:30, while she is at work.    At the end of March, she was sick with what appears to be viral gastroenteritis, for 2 weeks.    Her blood pressure was slightly elevated in the clinic.  The patient attributes this to \"white coat hypertension\".  She has not been checking her blood pressure at home (she does have a cuff).    Diabetes complications:  Retinopathy: last eye exam April 2018 - no DR  Nephropathy: no h/o microalbuminuria, GFR in the 70s   Neuropathy: pain in her feet towards the end of the day - since ~ 2013; no numbness or tingling sensation   The lowest BG number she remembers experiencing was in the 60s.   Takes a baby ASA and 40 mg atorvastatin.     Past Medical History   Past Medical History:   Diagnosis Date     Diabetic eye exam (H) 03/12/2015    [facility]     Hypertension      Type II or unspecified type diabetes mellitus without mention of complication, not stated as uncontrolled    Fatty liver   Cholelithiasis   No fractures   Menopause around age 45     Past Surgical [facility]   Past Surgical History:   Procedure Laterality Date     COLONOSCOPY N/A 3/17/2015    Procedure: COLONOSCOPY;  Surgeon: Mike Elizondo MD;  Location:  GI     COLONOSCOPY N/A 3/18/2015    Procedure: COMBINED COLONOSCOPY, SINGLE OR MULTIPLE BIOPSY/POLYPECTOMY BY BIOPSY;  Surgeon: Mike Elizondo MD;  Location:  GI   Pineal cyst surgery at age 18    Current Medications   Prescription Medications as of 4/24/2018             Alcohol Swabs (ALCOHOL WIPES) PADS 1 each daily Use to test blood sugar 1 times dailyPlease give patient meter, test trips and lancets as covered by insurance.    ASPIRIN 81 MG OR TABS 1 TABLET DAILY    atorvastatin (LIPITOR) 40 MG tablet TAKE ONE TABLET BY MOUTH ONCE DAILY    B-D U/F 31G X 8 MM insulin pen needle USE ONE  ONCE DAILY OR  AS  DIRECTED    bisacodyl " (DULCOLAX) 5 MG EC tablet Take 5 mg by mouth daily as needed for constipation (she took 3 tablets a week ago Friday) Reported on 3/27/2017    blood glucose monitoring (NO BRAND SPECIFIED) meter device kit Use to test blood sugar 1 times daily or as directed. Please give patient meter, test trips and lancets as covered by insurance.    blood glucose monitoring (NO BRAND SPECIFIED) test strip Use to test blood sugar 1 times daily or as directed. Please give patient meter, test trips and lancets as covered by insurance.    dulaglutide (TRULICITY) 0.75 MG/0.5ML pen Inject 0.75 mg Subcutaneous every 7 days    glipiZIDE (GLUCOTROL XL) 10 MG 24 hr tablet TAKE ONE TABLET BY MOUTH TWICE DAILY    insulin degludec (TRESIBA) 100 UNIT/ML pen Inject 25 Units Subcutaneous daily    lisinopril (PRINIVIL/ZESTRIL) 10 MG tablet TAKE ONE TABLET BY MOUTH ONCE DAILY    metFORMIN (GLUCOPHAGE) 1000 MG tablet TAKE ONE TABLET BY MOUTH TWICE DAILY WITH MEALS    ORDER FOR DME Equipment being ordered: needles for solostar flex pen    polyethylene glycol (MIRALAX/GLYCOLAX) powder Take 17 g (1 capful) by mouth daily    senna-docusate (SENOKOT-S;PERICOLACE) 8.6-50 MG per tablet Take 1-2 tablets by mouth 2 times daily as needed for constipation    thin (NO BRAND SPECIFIED) lancets Use to test blood sugar 1 times daily or as directed. Please give patient meter, test trips and lancets as covered by insurance.          Family History   Problem Relation Age of Onset     DIABETES type 2 dx in her 60s  Mother      GARRY. Mother      in her 60's     DIABETES type 2 dx in his 60s  Father      GARRY. Father      in his 50's   No family h/o thyroid disease.     Social History   with 1 child. She denies smoking. She drinks alcohol once every couple of months. 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     Review of Systems   Systemic:               Longstanding fatigue; she sleeps 7-8 hrs a night and she snores; she wakes up rested  "in the morning  Eye:                       No eye symptoms   Angel-Laryngeal:      No angel-laryngeal symptoms, no dysphagia, no voice hoarseness, no cough      Breast:                   No breast symptoms  Cardiovascular:     No cardiovascular symptoms, no CP or palpitations   Pulmonary:            No pulmonary symptoms, no cough or SOB    Gastrointestinal:    No gastrointestinal symptoms, no diarrhea or constipation   Genitourinary:        Uses the restroom 1-3 times a night; all her life   Endocrine:             Occasional chills noticed over wintertime   Neurological:         no headaches, no tremor, no dizziness     Musculoskeletal:    Muscle spasms in the inner thighs - at night, once a week for the last year; no joint pain   Skin:                       No skin symptoms, no hair loss   Psychological:       No psychological symptoms                Vital Signs     Previous Weights:    Wt Readings from Last 10 Encounters:   04/24/18 100.8 kg (222 lb 3.6 oz)   12/20/17 101.9 kg (224 lb 10.4 oz)   08/29/17 102.6 kg (226 lb 4.8 oz)   03/27/17 101.2 kg (223 lb)   09/21/16 102.1 kg (225 lb)   06/07/16 102.1 kg (225 lb)   05/18/16 100.7 kg (222 lb)   04/26/16 104.9 kg (231 lb 3.2 oz)   04/23/16 103.5 kg (228 lb 2.8 oz)   04/18/16 102.2 kg (225 lb 6.4 oz)                   /82  Pulse 71  Ht 1.638 m (5' 4.5\")  Wt 100.8 kg (222 lb 3.6 oz)  SpO2 97%  BMI 37.56 kg/m2  Blood pressure on recheck 149/84 pulse 71     Physical Exam  General Appearance:  General obesity, no distress noted   Eyes:  conjutivae and extra-ocular motions are normal.                                    pupils round and reactive to light, no lid lag, no stare    HEENT:   oropharynx clear and moist, no JVD, no bruits      no thyromegaly, no palpable nodules   Cardiovascular:  regular rhythm, no murmurs, distal pulse palpable, no edema  Respiratory:       chest clear, no rales, no rhonchi   Musculoskeletal: normal tone and " strength  Psychiatric: affect and judgment normal  Skin: Benign abd striae   Neurological: reflexes normal and symmetric, no resting tremor     Lab Results  I reviewed prior lab results documented in Epic.  Lab Results   Component Value Date    A1C 10.5 (H) 04/19/2018    A1C 10.8 12/20/2017    A1C 10.4 (H) 08/29/2017    A1C 10.1 (H) 03/27/2017    A1C 9.6 (H) 12/14/2016     Hemoglobin   Date Value Ref Range Status   04/26/2016 12.4 11.7 - 15.7 g/dL Final     Hematocrit   Date Value Ref Range Status   04/19/2018 42.4 35.0 - 47.0 % Final     Cholesterol   Date Value Ref Range Status   04/19/2018 128 <200 mg/dL Final     Cholesterol/HDL Ratio   Date Value Ref Range Status   09/10/2015 3.2 0.0 - 5.0 Final     HDL Cholesterol   Date Value Ref Range Status   04/19/2018 41 (L) >49 mg/dL Final     LDL Cholesterol Calculated   Date Value Ref Range Status   04/19/2018 30 <100 mg/dL Final     Comment:     Desirable:       <100 mg/dl     VLDL-Cholesterol   Date Value Ref Range Status   09/10/2015 30 0 - 30 mg/dL Final     Triglycerides   Date Value Ref Range Status   04/19/2018 287 (H) <150 mg/dL Final     Comment:     Borderline high:  150-199 mg/dl  High:             200-499 mg/dl  Very high:       >499 mg/dl       Albumin Urine mg/L   Date Value Ref Range Status   04/19/2018 Canceled, Test credited mg/L Final     Comment:     Specimen not received  SPOKE TO BOB SANDOVAL AT  LAB, NO SAMPLE.  ORDER PLACED INTO FUTURE 4.20.2018   0830 [facility]       TSH   Date Value Ref Range Status   04/19/2018 6.27 (H) 0.40 - 4.00 mU/L Final     Last Basic Metabolic Panel:    Sodium   Date Value Ref Range Status   04/19/2018 138 133 - 144 mmol/L Final     Potassium   Date Value Ref Range Status   04/19/2018 4.6 3.4 - 5.3 mmol/L Final     Chloride   Date Value Ref Range Status   04/19/2018 104 94 - 109 mmol/L Final     Calcium   Date Value Ref Range Status   04/19/2018 8.6 8.5 - 10.1 mg/dL Final     Carbon Dioxide   Date Value Ref Range Status    04/19/2018 26 20 - 32 mmol/L Final     Urea Nitrogen   Date Value Ref Range Status   04/19/2018 15 7 - 30 mg/dL Final     Creatinine   Date Value Ref Range Status   04/19/2018 0.74 0.52 - 1.04 mg/dL Final     GFR Estimate   Date Value Ref Range Status   04/19/2018 79 >60 mL/min/1.7m2 Final     Comment:     Non  GFR Calc     Glucose   Date Value Ref Range Status   04/19/2018 231 (H) 70 - 99 mg/dL Final     AST   Date Value Ref Range Status   04/19/2018 21 0 - 45 U/L Final     ALT   Date Value Ref Range Status   04/19/2018 36 0 - 50 U/L Final     Albumin   Date Value Ref Range Status   04/19/2018 3.6 3.4 - 5.0 g/dL Final       Assessment     1. Type 2 diabetes, uncontrolled, with no known microvascular disease with the exception of, possible, mild diabetic neuropathy.    Recommendations:  Increase the dose of Tresiba to 30 units  Increase the dose of Trulicity to 1.5 mg weekly  Check blood glucose in the morning, fasting, before dinner and before lunch  Have the meter downloaded in the clinic in 2 weeks    2. Hypertension, uncontrolled.   She is going to check her BP at home and let us know if the numbers are consistently above 140/90.    3.  High normal TSH on recent labs from last week.  In March, the patient was sick with what appears to be viral gastroenteritis.  Clinically, she does not endorse signs or symptoms suggestive of hypothyroidism.  I recommended to have the thyroid hormone levels rechecked at her follow-up appointment, together with the antithyroid antibodies.    4.  Hypercholesterolemia, controlled on statin.     Orders Placed This Encounter   Procedures     TSH     T4 free     Anti thyroglobulin antibody     Thyroid peroxidase antibody              Again, thank you for allowing me to participate in the care of your patient.        Sincerely,        Leandra Puri MD

## 2018-04-24 NOTE — NURSING NOTE
"Janet Ram's goals for this visit include: Follow up Diabetes  She requests these members of her care team be copied on today's visit information: YES    PCP: Estephanie Mancia    Referring Provider:  No referring provider defined for this encounter.    Chief Complaint   Patient presents with     Diabetes       Initial There were no vitals taken for this visit. Estimated body mass index is 37.97 kg/(m^2) as calculated from the following:    Height as of 12/20/17: 1.638 m (5' 4.5\").    Weight as of 12/20/17: 101.9 kg (224 lb 10.4 oz).  Medication Reconciliation: complete    Do you need any medication refills at today's visit? NO    "

## 2018-04-24 NOTE — PATIENT INSTRUCTIONS
Check BG when you wake up in the morning, fasting, and before dinner and before lunch   Have the meter downloaded in the clinic in 2 weeks   Check BP at home after sitting for 15 minutes   Please call Vernon Broadus to schedule an outpatient lab appointment 2 weeks prior to 8/1/18 follow up appt      [facility]-Department of Endocrinology  Claudia Naik RN, Diabetes Educator: [phone]  Clinic Nurses Paulette Hoyos: [phone]  Clinic Fax: [phone]  On-Call Endocrine at [facility] (after hours/weekends): [phone] option 4  Scheduling Line: [phone]    Appointment Reminders:  * Please bring meter with for staff to download  * If you are due ONLY for an A1C, it is scheduled with the nurse and will be done in clinic. You do not need to schedule a lab appointment. Fasting is not required for an A1C.  * Refill request should be submitted to your pharmacy. They will contact clinic for approval.

## 2018-04-24 NOTE — Clinical Note
Please abstract the following data from this visit with this patient into the appropriate field in Epic:  Eye exam with ophthalmology on this date: 4/18/18 [facility]

## 2018-04-24 NOTE — MR AVS SNAPSHOT
After Visit Summary   4/24/2018    Janet Ram    MRN: [id number]           Patient Information     Date Of Birth          1955        Visit Information        Provider Department      4/24/2018 10:00 AM Leandra Puri MD [facility]        Today's Diagnoses     Uncontrolled type 2 diabetes mellitus without complication, with long-term current use of insulin (H)    -  1    Hypertension goal BP (blood pressure) < 140/90        Hyperlipidemia LDL goal <100        High thyroid stimulating hormone (TSH) level          Care Instructions    Check BG when you wake up in the morning, fasting, and before dinner and before lunch   Have the meter downloaded in the clinic in 2 weeks   Check BP at home after sitting for 15 minutes   Please call [facility] to schedule an outpatient lab appointment 2 weeks prior to 8/1/18 follow up appt      [facility]-Department of Endocrinology  Claudia Naik RN, Diabetes Educator: [phone]  Clinic Nurses Paulette Hoyos: [phone]  Clinic Fax: [phone]  On-Call Endocrine at [facility] (after hours/weekends): [phone] option 4  Scheduling Line: [phone]    Appointment Reminders:  * Please bring meter with for staff to download  * If you are due ONLY for an A1C, it is scheduled with the nurse and will be done in clinic. You do not need to schedule a lab appointment. Fasting is not required for an A1C.  * Refill request should be submitted to your pharmacy. They will contact clinic for approval.                Follow-ups after your visit        Follow-up notes from your care team     Return in about 3 months (around 7/24/2018) for labs in 3 months.      Your next 10 appointments already scheduled     Aug 01, 2018 10:30 AM CDT   Return Visit with Leandra Puri MD   [facility] ([facility])    [id number] [place] [id number]    [phone]              Future tests that were ordered for you today     Open Future Orders        Priority Expected Expires Ordered    TSH Routine 2018    T4 free Routine 2018    Anti thyroglobulin antibody Routine 2018    Thyroid peroxidase antibody Routine 2018            Who to contact     If you have questions or need follow up information about today's clinic visit or your schedule please contact [facility] directly at [phone].  Normal or non-critical lab and imaging results will be communicated to you by MyChart, letter or phone within 4 business days after the clinic has received the results. If you do not hear from us within 7 days, please contact the clinic through [employer] or phone. If you have a critical or abnormal lab result, we will notify you by phone as soon as possible.  Submit refill requests through [employer] or call your pharmacy and they will forward the refill request to us. Please allow 3 business days for your refill to be completed.          Additional Information About Your Visit        [employer] Information     [employer] is an electronic gateway that provides easy, online access to your medical records. With [employer], you can request a clinic appointment, read your test results, renew a prescription or communicate with your care team.     To sign up for [employer] visit the website at [email or web address]   You will be asked to enter the access code listed below, as well as some personal information. Please follow the directions to create your username and password.     Your access code is: [email or web address]  Expires: 2018 10:40 AM     Your access code will  in 90 days. If you need help or a new code, please contact your [facility] or call [phone] for assistance.        Care EveryWhere ID     This is your Care EveryWhere ID.  "This could be used by other organizations to access your [place] medical records  [id number]        Your Vitals Were     Pulse Height Pulse Oximetry BMI (Body Mass Index)          71 1.638 m (5' 4.5\") 97% 37.56 kg/m2         Blood Pressure from Last 3 Encounters:   04/24/18 144/82   12/20/17 160/84   08/29/17 132/84    Weight from Last 3 Encounters:   04/24/18 100.8 kg (222 lb 3.6 oz)   12/20/17 101.9 kg (224 lb 10.4 oz)   08/29/17 102.6 kg (226 lb 4.8 oz)                 Today's Medication Changes          These changes are accurate as of 4/24/18 10:41 AM.  If you have any questions, ask your nurse or doctor.               Start taking these medicines.        Dose/Directions    dulaglutide 1.5 MG/0.5ML pen   Commonly known as:  TRULICITY   Used for:  Uncontrolled type 2 diabetes mellitus without complication, with long-term current use of insulin (H)   Replaces:  dulaglutide 0.75 MG/0.5ML pen   Started by:  Leandra Puri MD        Dose:  1.5 mg   Inject 1.5 mg Subcutaneous every 7 days   Quantity:  6 mL   Refills:  3         These medicines have changed or have updated prescriptions.        Dose/Directions    lisinopril 10 MG tablet   Commonly known as:  PRINIVIL/ZESTRIL   This may have changed:  See the new instructions.   Used for:  Hyperlipidemia LDL goal <100, Hypertension goal BP (blood pressure) < 140/90   Changed by:  Leandra Puri MD        Dose:  10 mg   Take 1 tablet (10 mg) by mouth 2 times daily   Quantity:  180 tablet   Refills:  3         Stop taking these medicines if you haven't already. Please contact your care team if you have questions.     dulaglutide 0.75 MG/0.5ML pen   Commonly known as:  TRULICITY   Replaced by:  dulaglutide 1.5 MG/0.5ML pen   Stopped by:  Leandra Puri MD                Where to get your medicines      These medications were sent to [facility] [place] - [address] [address]  [address] [facility] [address]     " Phone:  [phone]     dulaglutide 1.5 MG/0.5ML pen    lisinopril 10 MG tablet                Primary Care Provider Office Phone # Fax #    Estephanie Mancia PA-C [phone] [address] [place] [address]        Equal Access to Services     [facility]: Hadii aad ku hadasho Soomaali, waaxda luqadaha, qaybta kaalmada adeegyada, waxay idiin hayaan ademarie barroso latremaynen . So [facility] [phone].    ATENCIÓN: Si habla español, tiene a vides disposición servicios gratuitos de asistencia lingüística. Llame al [phone].    We comply with applicable federal civil rights laws and [place] laws. We do not discriminate on the basis of race, color, national origin, age, disability, sex, sexual orientation, or gender identity.            Thank you!     Thank you for choosing [facility]  for your care. Our goal is always to provide you with excellent care. Hearing back from our patients is one way we can continue to improve our services. Please take a few minutes to complete the written survey that you may receive in the mail after your visit with us. Thank you!             Your Updated Medication List - Protect others around you: Learn how to safely use, store and throw away your medicines at www.disposemymeds.org.          This list is accurate as of 4/24/18 10:41 AM.  Always use your most recent med list.                   Brand Name Dispense Instructions for use Diagnosis    Alcohol Wipes Pads     100 each    1 each daily Use to test blood sugar 1 times dailyPlease give patient meter, test trips and lancets as covered by insurance.    Type 2 diabetes, HbA1c goal < 7% (H), Uncontrolled diabetes mellitus type 2 without complications (H)       aspirin 81 MG tablet      1 TABLET DAILY        atorvastatin 40 MG tablet    LIPITOR    90 tablet    TAKE ONE TABLET BY MOUTH ONCE DAILY    Uncontrolled type 2 diabetes mellitus without complication, with long-term current use of insulin (H),  Hypertension goal BP (blood pressure) < 140/90, Hyperlipidemia LDL goal <100       B-D U/F 31G X 8 MM   Generic drug:  insulin pen needle     100 each    USE ONE  ONCE DAILY OR  AS  DIRECTED    Type 2 diabetes mellitus, uncontrolled (H)       bisacodyl 5 MG EC tablet    DULCOLAX     Take 5 mg by mouth daily as needed for constipation (she took 3 tablets a week ago Friday) Reported on 3/27/2017        blood glucose monitoring meter device kit    no brand specified    1 kit    Use to test blood sugar 1 times daily or as directed. Please give patient meter, test trips and lancets as covered by insurance.    Type 2 diabetes, HbA1c goal < 7% (H), Uncontrolled diabetes mellitus type 2 without complications (H)       blood glucose monitoring test strip    no brand specified    100 each    Use to test blood sugar 1 times daily or as directed. Please give patient meter, test trips and lancets as covered by insurance.    Type 2 diabetes, HbA1c goal < 7% (H), Uncontrolled diabetes mellitus type 2 without complications (H)       dulaglutide 1.5 MG/0.5ML pen    TRULICITY    6 mL    Inject 1.5 mg Subcutaneous every 7 days    Uncontrolled type 2 diabetes mellitus without complication, with long-term current use of insulin (H)       glipiZIDE 10 MG 24 hr tablet    GLUCOTROL XL    180 tablet    TAKE ONE TABLET BY MOUTH TWICE DAILY    Uncontrolled type 2 diabetes mellitus without complication, with long-term current use of insulin (H)       insulin degludec 100 UNIT/ML pen    TRESIBA    25 mL    Inject 25 Units Subcutaneous daily    Uncontrolled type 2 diabetes mellitus without complication, with long-term current use of insulin (H)       lisinopril 10 MG tablet    PRINIVIL/ZESTRIL    180 tablet    Take 1 tablet (10 mg) by mouth 2 times daily    Hyperlipidemia LDL goal <100, Hypertension goal BP (blood pressure) < 140/90       metFORMIN 1000 MG tablet    GLUCOPHAGE    180 tablet    TAKE ONE TABLET BY MOUTH TWICE DAILY WITH MEALS     Uncontrolled type 2 diabetes mellitus without complication, with long-term current use of insulin (H)       order for DME     1 Box    Equipment being ordered: needles for solostar flex pen    Type 2 diabetes, HbA1c goal < 7% (H)       polyethylene glycol powder    MIRALAX/GLYCOLAX    850 g    Take 17 g (1 capful) by mouth daily    Obstipation       senna-docusate 8.6-50 MG per tablet    SENOKOT-S;PERICOLACE    100 tablet    Take 1-2 tablets by mouth 2 times daily as needed for constipation    Obstipation       thin lancets    NO BRAND SPECIFIED    1 Box    Use to test blood sugar 1 times daily or as directed. Please give patient meter, test trips and lancets as covered by insurance.    Type 2 diabetes, HbA1c goal < 7% (H), Uncontrolled diabetes mellitus type 2 without complications (H)

## 2018-04-25 DIAGNOSIS — I10 BENIGN ESSENTIAL HYPERTENSION: Primary | ICD-10-CM

## 2018-04-25 NOTE — TELEPHONE ENCOUNTER
Notification received from [facility]. Insurance does not cover Lisinopril 10 mg twice a day. Requesting 20 mg daily.     Will forward to Dr. Leandra Puri to verify if OK for change.    Soha Jose LPN  Adult Endocrinology  [facility]

## 2018-04-26 RX ORDER — LISINOPRIL 20 MG/1
20 TABLET ORAL DAILY
Qty: 90 TABLET | Refills: 3 | Status: SHIPPED | OUTPATIENT
Start: 2018-04-26 | End: 2019-01-09

## 2018-04-27 NOTE — TELEPHONE ENCOUNTER
Refill completed by Dr. Puri.    Soha Jose LPN  Adult Endocrinology  [facility]

## 2018-05-02 DIAGNOSIS — I10 HYPERTENSION GOAL BP (BLOOD PRESSURE) < 140/90: ICD-10-CM

## 2018-05-02 DIAGNOSIS — E78.5 HYPERLIPIDEMIA LDL GOAL <100: ICD-10-CM

## 2018-05-03 RX ORDER — ATORVASTATIN CALCIUM 40 MG/1
TABLET, FILM COATED ORAL
Qty: 90 TABLET | Refills: 1 | Status: SHIPPED | OUTPATIENT
Start: 2018-05-03 | End: 2018-11-01

## 2018-05-03 NOTE — TELEPHONE ENCOUNTER
Atorvastatin    Prescription approved per [facility] Refill Protocol.    Shira Salinas, RN, BSN

## 2018-05-03 NOTE — TELEPHONE ENCOUNTER
Metformin    BP Readings from Last 3 Encounters:   04/24/18 144/82   12/20/17 160/84   08/29/17 132/84     Routing refill request to provider for review/approval because:  Labs out of range:  B/P    Shira Salinas RN, BSN

## 2018-05-09 RX ORDER — GLIPIZIDE 10 MG/1
TABLET, FILM COATED, EXTENDED RELEASE ORAL
Qty: 180 TABLET | Refills: 0 | Status: SHIPPED | OUTPATIENT
Start: 2018-05-09 | End: 2018-08-01

## 2018-05-09 NOTE — TELEPHONE ENCOUNTER
"Requested Prescriptions   Pending Prescriptions Disp Refills     glipiZIDE (GLUCOTROL XL) 10 MG 24 hr tablet [Pharmacy Med Name: GLIPIZIDE ER 10MG   TAB] 180 tablet 0     Sig: TAKE ONE TABLET BY MOUTH TWICE DAILY    Sulfonylurea Agents Failed    5/8/2018  7:58 AM       Failed - Blood pressure less than 140/90 in past 6 months    BP Readings from Last 3 Encounters:   04/24/18 144/82   12/20/17 160/84   08/29/17 132/84                Failed - Recent (6 mo) or future (30 days) visit within the authorizing provider's specialty    Patient had office visit in the last 6 months or has a visit in the next 30 days with authorizing provider or within the authorizing provider's specialty.  See \"Patient Info\" tab in inbasket, or \"Choose Columns\" in Meds & Orders section of the refill encounter.           Passed - Patient has documented LDL within the past 12 mos.    Recent Labs   Lab Test  04/19/18   0746   LDL  30            Passed - Patient has had a Microalbumin in the past 12 mos.    Recent Labs   Lab Test  04/19/18   0747   MICROL  Canceled, Test credited   UMALCR  Unable to calculate            Passed - Patient has documented A1c within the specified period of time.    Recent Labs   Lab Test  04/19/18   0748   A1C  10.5*            Passed - Patient is age 18 or older       Passed - No active pregnancy on record       Passed - Patient has a recent creatinine (normal) within the past 12 mos.    Recent Labs   Lab Test  04/19/18   0746   CR  0.74            Passed - Patient has not had a positive pregnancy test within the past 12 mos.      Prescription approved per [facility] Refill Protocol. Sees guillermo Barron, RN, BSN          "

## 2018-05-18 ENCOUNTER — HEALTH MAINTENANCE LETTER (OUTPATIENT)
Age: 63
End: 2018-05-18

## 2018-06-27 NOTE — PATIENT INSTRUCTIONS

## 2018-06-27 NOTE — PROGRESS NOTES
SUBJECTIVE:   CC: Janet Ram is an 63 year old woman who presents for preventive health visit.     Physical   Annual:     Getting at least 3 servings of Calcium per day:  Yes    Bi-annual eye exam:  Yes    Dental care twice a year:  Yes    Sleep apnea or symptoms of sleep apnea:  None    Diet:  Diabetic    Taking medications regularly:  Yes    Medication side effects:  None    Additional concerns today:  No      The ASCVD Risk score (Tony DC Jr, et al., 2013) failed to calculate for the following reasons:    The valid total cholesterol range is 130 to 320 mg/dL  Patient is eligible for use of low-dose aspirin for primary prevention of heart attack and stroke.  Provider has discussed aspirin with patient and our decision was:     Prescribe:  Daily low-dose aspirin recommended for primary prevention, patient agrees with plan.        Today's PHQ-2 Score:   PHQ-2 ( 1999 [employer]) 7/3/2018   Q1: Little interest or pleasure in doing things 0   Q2: Feeling down, depressed or hopeless 0   PHQ-2 Score 0   Q1: Little interest or pleasure in doing things Not at all   Q2: Feeling down, depressed or hopeless Not at all   PHQ-2 Score 0       Abuse: Current or Past(Physical, Sexual or Emotional)- No  Do you feel safe in your environment - Yes    Social History   Substance Use Topics     Smoking status: Never Smoker     Smokeless tobacco: Never Used     Alcohol use 0.0 oz/week     0 Standard drinks or equivalent per week      Comment: rare     Alcohol Use 7/3/2018   If you drink alcohol do you typically have greater than 3 drinks per day OR greater than 7 drinks per week? No       Reviewed orders with patient.  Reviewed health maintenance and updated orders accordingly - Yes  Labs reviewed in EPIC  BP Readings from Last 3 Encounters:   07/03/18 134/70   04/24/18 144/82   12/20/17 160/84    Wt Readings from Last 3 Encounters:   07/03/18 216 lb (98 kg)   04/24/18 222 lb 3.6 oz (100.8 kg)   12/20/17 224 lb 10.4 oz (101.9 kg)                   Recent Labs   Lab Test  04/19/18   0748  04/19/18   0746 12/20/17 08/29/17   0851   09/21/16   1001  06/01/16   1024   A1C  10.5*   --   10.8  10.4*   < >  9.3*   --    LDL   --   30   --   66   --   66   --    HDL   --   41*   --   46*   --   49*   --    TRIG   --   287*   --   201*   --   205*   --    ALT   --   36   --   28   --    --   39   CR   --   0.74   --   0.82   --    --   0.74   GFRESTIMATED   --   79   --   70   --    --   80   GFRESTBLACK   --   >90   --   85   --    --   >90   GFR Calc     POTASSIUM   --   4.6   --   4.4   --    --   4.2   TSH  6.27*   --    --    --    --   3.83   --     < > = values in this interval not displayed.        Patient over age 50, mutual decision to screen reflected in health maintenance.    Pertinent mammograms are reviewed under the imaging tab.  History of abnormal Pap smear:   NO - age 30-65 PAP every 5 years with negative HPV co-testing recommended  Last 3 Pap and HPV Results:   PAP / HPV Latest Ref Rng & Units 9/10/2015 4/2/2012   PAP - NIL NIL   HPV 16 DNA NEG Negative -   HPV 18 DNA NEG Negative -   OTHER HR HPV NEG Negative -     PAP / HPV Latest Ref Rng & Units 9/10/2015 4/2/2012   PAP - NIL NIL   HPV 16 DNA NEG Negative -   HPV 18 DNA NEG Negative -   OTHER HR HPV NEG Negative -     Reviewed and updated as needed this visit by clinical staff  Tobacco  Allergies  Meds  Med Hx  Surg Hx  Fam Hx  Soc Hx        Reviewed and updated as needed this visit by Provider            Review of Systems  CONSTITUTIONAL:weight loss, does try to improve her diet and exercise  INTEGUMENTARY/SKIN: NEGATIVE for worrisome rashes, moles or lesions  EYES: NEGATIVE for vision changes or irritation  ENT: NEGATIVE for ear, mouth and throat problems  RESP: NEGATIVE for significant cough or SOB  BREAST: NEGATIVE for masses, tenderness or discharge  CV: NEGATIVE for chest pain, palpitations or peripheral edema  GI: NEGATIVE for nausea, abdominal pain,  "heartburn, or change in bowel habits  : NEGATIVE for unusual urinary or vaginal symptoms. No vaginal bleeding.   menopausal female: Vaginal dryness, never had any form of cancer  MUSCULOSKELETAL: NEGATIVE for significant arthralgias or myalgia  NEURO: NEGATIVE for weakness, dizziness or paresthesias  PSYCHIATRIC: NEGATIVE for changes in mood or affect      OBJECTIVE:   /70  Pulse 76  Temp 97.6  F (36.4  C) (Temporal)  Resp 16  Ht 5' 4.25\" (1.632 m)  Wt 216 lb (98 kg)  BMI 36.79 kg/m2  Physical Exam  GENERAL: healthy, alert and no distress  EYES: Eyes grossly normal to inspection, PERRL and conjunctivae and sclerae normal  HENT: ear canals and TM's normal, nose and mouth without ulcers or lesions  NECK: no adenopathy, no asymmetry, masses, or scars and thyroid normal to palpation  RESP: lungs clear to auscultation - no rales, rhonchi or wheezes  BREAST: normal without masses, tenderness or nipple discharge and no palpable axillary masses or adenopathy  CV: regular rate and rhythm, normal S1 S2, no S3 or S4, no murmur, click or rub, no peripheral edema and peripheral pulses strong  ABDOMEN: soft, nontender, no hepatosplenomegaly, no masses and bowel sounds normal  MS: no gross musculoskeletal defects noted, no edema  SKIN: no suspicious lesions or rashes  NEURO: Normal strength and tone, mentation intact and speech normal  PSYCH: mentation appears normal, affect normal/bright    Diagnostic Test Results:  No results found for this or any previous visit (from the past 24 hour(s)).    ASSESSMENT/PLAN:   1. Encounter for routine adult health examination without abnormal findings  Healthy though overweight female with improving blood sugars    2. Uncontrolled type 2 diabetes mellitus without complication, with long-term current use of insulin (H)  Following with endocrinology    3. Special screening for malignant neoplasms, colon  Ordered patient will schedule when they call  - GASTROENTEROLOGY ADULT REF " "PROCEDURE ONLY [facility] [phone]; [place] Provider    COUNSELING:  Reviewed preventive health counseling, as reflected in patient instructions    BP Readings from Last 1 Encounters:   07/03/18 134/70     Estimated body mass index is 36.79 kg/(m^2) as calculated from the following:    Height as of this encounter: 5' 4.25\" (1.632 m).    Weight as of this encounter: 216 lb (98 kg).      Weight management plan: Discussed healthy diet and exercise guidelines and patient will follow up in 12 months in clinic to re-evaluate.     reports that she has never smoked. She has never used smokeless tobacco.      Counseling Resources:  ATP IV Guidelines  Pooled Cohorts Equation Calculator  Breast Cancer Risk Calculator  FRAX Risk Assessment  ICSI Preventive Guidelines  Dietary Guidelines for Americans, 2010  USDA's MyPlate  ASA Prophylaxis  Lung CA Screening    Estephanie Mancia PA-C  [facility] [place]  Answers for HPI/ROS submitted by the patient on 7/3/2018   PHQ-2 Score: 0  Electronically signed by Estephanie Mancia PA-C   "

## 2018-07-03 ENCOUNTER — OFFICE VISIT (OUTPATIENT)
Dept: FAMILY MEDICINE | Facility: OTHER | Age: 63
End: 2018-07-03
Payer: COMMERCIAL

## 2018-07-03 ENCOUNTER — TELEPHONE (OUTPATIENT)
Dept: FAMILY MEDICINE | Facility: OTHER | Age: 63
End: 2018-07-03

## 2018-07-03 VITALS
HEART RATE: 76 BPM | BODY MASS INDEX: 36.88 KG/M2 | WEIGHT: 216 LBS | RESPIRATION RATE: 16 BRPM | SYSTOLIC BLOOD PRESSURE: 134 MMHG | DIASTOLIC BLOOD PRESSURE: 70 MMHG | HEIGHT: 64 IN | TEMPERATURE: 97.6 F

## 2018-07-03 DIAGNOSIS — Z00.00 ENCOUNTER FOR ROUTINE ADULT HEALTH EXAMINATION WITHOUT ABNORMAL FINDINGS: Primary | ICD-10-CM

## 2018-07-03 DIAGNOSIS — Z12.11 SPECIAL SCREENING FOR MALIGNANT NEOPLASMS, COLON: ICD-10-CM

## 2018-07-03 PROCEDURE — 99396 PREV VISIT EST AGE 40-64: CPT | Performed by: PHYSICIAN ASSISTANT

## 2018-07-03 RX ORDER — GLIPIZIDE 10 MG/1
10 TABLET, FILM COATED, EXTENDED RELEASE ORAL 2 TIMES DAILY
Qty: 180 TABLET | Refills: 0 | Status: CANCELLED | OUTPATIENT
Start: 2018-07-03

## 2018-07-03 ASSESSMENT — PAIN SCALES - GENERAL: PAINLEVEL: NO PAIN (0)

## 2018-07-03 NOTE — LETTER
[facility]  [address]  [place] [address]  [phone]        July 3, 2018    Janet Ram  [address]   [place] [address]

## 2018-07-03 NOTE — MR AVS SNAPSHOT
After Visit Summary   7/3/2018    Janet Ram    MRN: [id number]           Patient Information     Date Of Birth          1955        Visit Information        Provider Department      7/3/2018 8:30 AM Estephanie Mancia PA-C [facility]        Today's Diagnoses     Encounter for routine adult health examination without abnormal findings    -  1    Uncontrolled type 2 diabetes mellitus without complication, with long-term current use of insulin (H)        Special screening for malignant neoplasms, colon          Care Instructions      Preventive Health Recommendations  Female Ages 50 - 64    Yearly exam: See your health care provider every year in order to  o Review health changes.   o Discuss preventive care.    o Review your medicines if your doctor has prescribed any.      Get a Pap test every three years (unless you have an abnormal result and your provider advises testing more often).    If you get Pap tests with HPV test, you only need to test every 5 years, unless you have an abnormal result.     You do not need a Pap test if your uterus was removed (hysterectomy) and you have not had cancer.    You should be tested each year for STDs (sexually transmitted diseases) if you're at risk.     Have a mammogram every 1 to 2 years.    Have a colonoscopy at age 50, or have a yearly FIT test (stool test). These exams screen for colon cancer.      Have a cholesterol test every 5 years, or more often if advised.    Have a diabetes test (fasting glucose) every three years. If you are at risk for diabetes, you should have this test more often.     If you are at risk for osteoporosis (brittle bone disease), think about having a bone density scan (DEXA).    Shots: Get a flu shot each year. Get a tetanus shot every 10 years.    Nutrition:     Eat at least 5 servings of fruits and vegetables each day.    Eat whole-grain bread, whole-wheat pasta and brown rice instead of white grains and  "rice.    Get adequate Calcium and Vitamin D.     Lifestyle    Exercise at least 150 minutes a week (30 minutes a day, 5 days a week). This will help you control your weight and prevent disease.    Limit alcohol to one drink per day.    No smoking.     Wear sunscreen to prevent skin cancer.     See your dentist every six months for an exam and cleaning.    See your eye doctor every 1 to 2 years.            Follow-ups after your visit        Your next 10 appointments already scheduled     Aug 01, 2018 10:30 AM CDT   Return Visit with Leandra Puri MD   [facility]    [address] [address] [place] [id number] [phone]              Who to contact     If you have questions or need follow up information about today's clinic visit or your schedule please contact [facility] directly at [phone].  Normal or non-critical lab and imaging results will be communicated to you by MyChart, letter or phone within 4 business days after the clinic has received the results. If you do not hear from us within 7 days, please contact the clinic through MyChart or phone. If you have a critical or abnormal lab result, we will notify you by phone as soon as possible.  Submit refill requests through Etable or call your pharmacy and they will forward the refill request to us. Please allow 3 business days for your refill to be completed.          Additional Information About Your Visit        Care EveryWhere ID     This is your Care EveryWhere ID. This could be used by other organizations to access your [facility] medical records  [id number]        Your Vitals Were     Pulse Temperature Respirations Height BMI (Body Mass Index)       76 97.6  F (36.4  C) (Temporal) 16 5' 4.25\" (1.632 m) 36.79 kg/m2        Blood Pressure from Last 3 Encounters:   07/03/18 134/70   04/24/18 144/82   12/20/17 160/84    Weight from Last 3 Encounters:   07/03/18 216 lb (98 kg) "   04/24/18 222 lb 3.6 oz (100.8 kg)   12/20/17 224 lb 10.4 oz (101.9 kg)              Today, you had the following     No orders found for display         Today's Medication Changes          These changes are accurate as of 7/3/18  8:57 AM.  If you have any questions, ask your nurse or doctor.               These medicines have changed or have updated prescriptions.        Dose/Directions    lisinopril 20 MG tablet   Commonly known as:  PRINIVIL/ZESTRIL   This may have changed:  Another medication with the same name was removed. Continue taking this medication, and follow the directions you see here.   Used for:  Benign essential hypertension   Changed by:  Estephanie Mancia PA-C        Dose:  20 mg   Take 1 tablet (20 mg) by mouth daily   Quantity:  90 tablet   Refills:  3                Primary Care Provider Office Phone # Fax #    Estephanie Mancia PA-C [phone] [address] [place] [address]        Equal Access to Services     [facility] : Hadii clay yen hadasho Soomaali, waaxda luqadaha, qaybta kaalmada adeegyada, waxay renzoin hayselvin rodrigez . So [facility] [phone].    ATENCIÓN: Si habla español, tiene a vides disposición servicios gratuitos de asistencia lingüística. Llame al [phone].    We comply with applicable federal civil rights laws and [place] laws. We do not discriminate on the basis of race, color, national origin, age, disability, sex, sexual orientation, or gender identity.            Thank you!     Thank you for choosing [facility]  for your care. Our goal is always to provide you with excellent care. Hearing back from our patients is one way we can continue to improve our services. Please take a few minutes to complete the written survey that you may receive in the mail after your visit with us. Thank you!             Your Updated Medication List - Protect others around you: Learn how to safely use, store and throw away your medicines at  www.disposemymeds.org.          This list is accurate as of 7/3/18  8:57 AM.  Always use your most recent med list.                   Brand Name Dispense Instructions for use Diagnosis    Alcohol Wipes Pads     100 each    1 each daily Use to test blood sugar 1 times dailyPlease give patient meter, test trips and lancets as covered by insurance.    Type 2 diabetes, HbA1c goal < 7% (H), Uncontrolled diabetes mellitus type 2 without complications (H)       aspirin 81 MG tablet      1 TABLET DAILY        atorvastatin 40 MG tablet    LIPITOR    90 tablet    TAKE ONE TABLET BY MOUTH ONCE DAILY    Uncontrolled type 2 diabetes mellitus without complication, with long-term current use of insulin (H), Hypertension goal BP (blood pressure) < 140/90, Hyperlipidemia LDL goal <100       B-D U/F 31G X 8 MM   Generic drug:  insulin pen needle     100 each    USE ONE  ONCE DAILY OR  AS  DIRECTED    Type 2 diabetes mellitus, uncontrolled (H)       bisacodyl 5 MG EC tablet    DULCOLAX     Take 5 mg by mouth daily as needed for constipation (she took 3 tablets a week ago Friday) Reported on 3/27/2017        blood glucose monitoring meter device kit    no brand specified    1 kit    Use to test blood sugar 1 times daily or as directed. Please give patient meter, test trips and lancets as covered by insurance.    Type 2 diabetes, HbA1c goal < 7% (H), Uncontrolled diabetes mellitus type 2 without complications (H)       blood glucose monitoring test strip    no brand specified    100 each    Use to test blood sugar 1 times daily or as directed. Please give patient meter, test trips and lancets as covered by insurance.    Type 2 diabetes, HbA1c goal < 7% (H), Uncontrolled diabetes mellitus type 2 without complications (H)       dulaglutide 1.5 MG/0.5ML pen    TRULICITY    6 mL    Inject 1.5 mg Subcutaneous every 7 days    Uncontrolled type 2 diabetes mellitus without complication, with long-term current use of insulin (H)       glipiZIDE  10 MG 24 hr tablet    GLUCOTROL XL    180 tablet    TAKE ONE TABLET BY MOUTH TWICE DAILY    Uncontrolled type 2 diabetes mellitus without complication, with long-term current use of insulin (H)       insulin degludec 100 UNIT/ML pen    TRESIBA    25 mL    Inject 25 Units Subcutaneous daily    Uncontrolled type 2 diabetes mellitus without complication, with long-term current use of insulin (H)       lisinopril 20 MG tablet    PRINIVIL/ZESTRIL    90 tablet    Take 1 tablet (20 mg) by mouth daily    Benign essential hypertension       metFORMIN 1000 MG tablet    GLUCOPHAGE    180 tablet    TAKE ONE TABLET BY MOUTH TWICE DAILY WITH MEALS    Uncontrolled type 2 diabetes mellitus without complication, with long-term current use of insulin (H)       order for DME     1 Box    Equipment being ordered: needles for solostar flex pen    Type 2 diabetes, HbA1c goal < 7% (H)       polyethylene glycol powder    MIRALAX/GLYCOLAX    850 g    Take 17 g (1 capful) by mouth daily    Obstipation       senna-docusate 8.6-50 MG per tablet    SENOKOT-S;PERICOLACE    100 tablet    Take 1-2 tablets by mouth 2 times daily as needed for constipation    Obstipation       thin lancets    NO BRAND SPECIFIED    1 Box    Use to test blood sugar 1 times daily or as directed. Please give patient meter, test trips and lancets as covered by insurance.    Type 2 diabetes, HbA1c goal < 7% (H), Uncontrolled diabetes mellitus type 2 without complications (H)

## 2018-07-03 NOTE — TELEPHONE ENCOUNTER
Date of colonoscopy/EGD: 8/8/18  Surgeon: Dr. Davenport  Prep:Miralax  Packet:Colonoscopy/EGD instructions mailed to patient's home address.   Date: 7/3/2018      Surgery Scheduler

## 2018-07-03 NOTE — LETTER

## 2018-07-25 DIAGNOSIS — R79.89 HIGH THYROID STIMULATING HORMONE (TSH) LEVEL: ICD-10-CM

## 2018-07-25 LAB
T4 FREE SERPL-MCNC: 0.87 NG/DL (ref 0.76–1.46)
TSH SERPL DL<=0.005 MIU/L-ACNC: 4.88 MU/L (ref 0.4–4)

## 2018-07-25 PROCEDURE — 84439 ASSAY OF FREE THYROXINE: CPT | Performed by: INTERNAL MEDICINE

## 2018-07-25 PROCEDURE — 86800 THYROGLOBULIN ANTIBODY: CPT | Performed by: INTERNAL MEDICINE

## 2018-07-25 PROCEDURE — 36415 COLL VENOUS BLD VENIPUNCTURE: CPT | Performed by: INTERNAL MEDICINE

## 2018-07-25 PROCEDURE — 84443 ASSAY THYROID STIM HORMONE: CPT | Performed by: INTERNAL MEDICINE

## 2018-07-25 PROCEDURE — 86376 MICROSOMAL ANTIBODY EACH: CPT | Performed by: INTERNAL MEDICINE

## 2018-07-26 LAB
THYROGLOB AB SERPL IA-ACNC: 31 IU/ML (ref 0–40)
THYROPEROXIDASE AB SERPL-ACNC: 142 IU/ML

## 2018-08-01 ENCOUNTER — OFFICE VISIT (OUTPATIENT)
Dept: ENDOCRINOLOGY | Facility: CLINIC | Age: 63
End: 2018-08-01
Payer: COMMERCIAL

## 2018-08-01 VITALS
DIASTOLIC BLOOD PRESSURE: 84 MMHG | OXYGEN SATURATION: 100 % | SYSTOLIC BLOOD PRESSURE: 161 MMHG | HEART RATE: 74 BPM | WEIGHT: 218.7 LBS | BODY MASS INDEX: 37.34 KG/M2 | HEIGHT: 64 IN

## 2018-08-01 DIAGNOSIS — E66.01 MORBID OBESITY (H): ICD-10-CM

## 2018-08-01 DIAGNOSIS — I10 HYPERTENSION GOAL BP (BLOOD PRESSURE) < 140/90: ICD-10-CM

## 2018-08-01 DIAGNOSIS — E06.3 HASHIMOTO'S THYROIDITIS: Primary | ICD-10-CM

## 2018-08-01 LAB — HBA1C MFR BLD: 9.1 % (ref 0–5.7)

## 2018-08-01 PROCEDURE — 36415 COLL VENOUS BLD VENIPUNCTURE: CPT | Performed by: INTERNAL MEDICINE

## 2018-08-01 PROCEDURE — 99214 OFFICE O/P EST MOD 30 MIN: CPT | Performed by: INTERNAL MEDICINE

## 2018-08-01 PROCEDURE — 83036 HEMOGLOBIN GLYCOSYLATED A1C: CPT | Performed by: INTERNAL MEDICINE

## 2018-08-01 RX ORDER — LEVOTHYROXINE SODIUM 50 UG/1
50 TABLET ORAL DAILY
Qty: 90 TABLET | Refills: 3 | Status: SHIPPED | OUTPATIENT
Start: 2018-08-01 | End: 2018-08-01

## 2018-08-01 RX ORDER — LEVOTHYROXINE SODIUM 50 UG/1
50 TABLET ORAL DAILY
Qty: 90 TABLET | Refills: 1 | Status: SHIPPED | OUTPATIENT
Start: 2018-08-01 | End: 2019-01-09

## 2018-08-01 RX ORDER — GLIPIZIDE 10 MG/1
10 TABLET, FILM COATED, EXTENDED RELEASE ORAL 2 TIMES DAILY
Qty: 180 TABLET | Refills: 3 | Status: SHIPPED | OUTPATIENT
Start: 2018-08-01 | End: 2019-01-09

## 2018-08-01 NOTE — NURSING NOTE
"Janet Ram's goals for this visit include: Follow up Diabetes  She requests these members of her care team be copied on today's visit information: YES    PCP: Estephanie Mancia    Referring Provider:  No referring provider defined for this encounter.    Ht 1.632 m (5' 4.25\")  Wt 99.2 kg (218 lb 11.1 oz)  BMI 37.25 kg/m2    Do you need any medication refills at today's visit? YES    "

## 2018-08-01 NOTE — LETTER
8/1/2018         RE: Janet Ram  [address]  [place] [facility] [address]        Dear Colleague,    Thank you for referring your patient, Janet Ram, to the [facility]. Please see a copy of my visit note below.      The patient is seen in f/up.    Janet Ram is a 63 year old female diagnosed with gestational diabetes (diet controled) at age 32 and formally diagnosed with type 2 diabetes, in 2005. Her diabetes has been well controlled, until 2013.  Following 2013, the glycemic control gradually deteriorated. A1c was 10.8, on 12/20/17.  It was 10.5% today.  Since diagnosis, she has been medicated with metformin and glipizide.  She continues to take 1 g regular metformin, twice daily, and 10 mg glipizide, twice a day.  Insulin treatment was started in 2014, initially with Lantus, later with Levemir. At her first appointment here, in 12/2017, Levemir was discontinued and changed with 25 U of Tresiba and Trulicity was added.    As per patient, she never picked up the prescription for Tresiba.  She has been compliant in taking Trulicity at 1.5 mg weekly, since April of this year. Her weight is down 6 pounds since starting Trulicity.  Hemoglobin A1c today was 9.1%, down from 10.5% in April.  The glucometer readings revealed that she checks her blood glucose before meals and at bedtime, approximately twice daily.  Average blood glucose is 174 with a standard deviation of 38 and a range variable between 120 and 287.  Morning blood sugar is consistently elevated, around 160-170.  During daytime, most of the blood sugar numbers are lower, but she does have scattered hyperglycemic episodes throughout the day, in the lower 200 range.      Shortly after her first visit here, she did get a blood pressure cuff but she does not remember the blood pressure numbers.      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 When she is not working, she has 3 meals.  At bedtime, she has a snack (frequently pudding).  At 5 AM in the morning, when she wakes up, she frequently has a Slim fast and a toast prior to going to work.  She checks her blood glucose around 10:30, while she is at work.    Diabetes complications:  Retinopathy: last eye exam April 2018 - no DR  Nephropathy: no h/o microalbuminuria, GFR in the 70s   Neuropathy: pain in her feet towards the end of the day - since ~ 2013; no numbness or tingling sensation   The lowest BG number she remembers experiencing was in the 60s.   Takes a baby ASA and 40 mg atorvastatin.     Past Medical History   Past Medical History:   Diagnosis Date     Diabetic eye exam (H) 03/12/2015    [facility]     Hypertension      Type II or unspecified type diabetes mellitus without mention of complication, not stated as uncontrolled    Fatty liver   Cholelithiasis   No fractures   Menopause around age 45     Past Surgical [place]   Past Surgical History:   Procedure Laterality Date     COLONOSCOPY N/A 3/17/2015    Procedure: COLONOSCOPY;  Surgeon: Mike Elizondo MD;  Location:  GI     COLONOSCOPY N/A 3/18/2015    Procedure: COMBINED COLONOSCOPY, SINGLE OR MULTIPLE BIOPSY/POLYPECTOMY BY BIOPSY;  Surgeon: Mike Elizondo MD;  Location:  GI   Pineal cyst surgery at age 18    Current Medications   Prescription Medications as of 8/1/2018             Alcohol Swabs (ALCOHOL WIPES) PADS 1 each daily Use to test blood sugar 1 times dailyPlease give patient meter, test trips and lancets as covered by insurance.    ASPIRIN 81 MG OR TABS 1 TABLET DAILY    atorvastatin (LIPITOR) 40 MG tablet TAKE ONE TABLET BY MOUTH ONCE DAILY    B-D U/F 31G X 8 MM insulin pen needle USE ONE  ONCE DAILY OR  AS  DIRECTED    bisacodyl (DULCOLAX) 5 MG EC tablet Take 5 mg by mouth daily as needed for constipation (she took 3 tablets a week  ago Friday) Reported on 3/27/2017    blood glucose monitoring (NO BRAND SPECIFIED) meter device kit Use to test blood sugar 1 times daily or as directed. Please give patient meter, test trips and lancets as covered by insurance.    blood glucose monitoring (NO BRAND SPECIFIED) test strip Use to test blood sugar 1 times daily or as directed. Please give patient meter, test trips and lancets as covered by insurance.    dulaglutide (TRULICITY) 1.5 MG/0.5ML pen Inject 1.5 mg Subcutaneous every 7 days    glipiZIDE (GLUCOTROL XL) 10 MG 24 hr tablet TAKE ONE TABLET BY MOUTH TWICE DAILY    lisinopril (PRINIVIL/ZESTRIL) 20 MG tablet Take 1 tablet (20 mg) by mouth daily    metFORMIN (GLUCOPHAGE) 1000 MG tablet TAKE ONE TABLET BY MOUTH TWICE DAILY WITH MEALS    polyethylene glycol (MIRALAX/GLYCOLAX) powder Take 17 g (1 capful) by mouth daily    senna-docusate (SENOKOT-S;PERICOLACE) 8.6-50 MG per tablet Take 1-2 tablets by mouth 2 times daily as needed for constipation    thin (NO BRAND SPECIFIED) lancets Use to test blood sugar 1 times daily or as directed. Please give patient meter, test trips and lancets as covered by insurance.    insulin degludec (TRESIBA) 100 UNIT/ML pen Inject 25 Units Subcutaneous daily    ORDER FOR DME Equipment being ordered: needles for solostar flex pen          Family History   Problem Relation Age of Onset     DIABETES type 2 dx in her 60s  Mother      C.A.D. Mother      in her 60's     DIABETES type 2 dx in his 60s  Father      C.A.D. Father      in his 50's   No family h/o thyroid disease.     Social History   with 1 child. She denies smoking. She drinks alcohol once every couple of months. 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     Review of Systems   Systemic:               Longstanding fatigue; she sleeps 7-8 hrs a night and she snores; she wakes up rested in the morning; weight down 6 lbs since 12/17  Eye:                       No eye symptoms  "  Angel-Laryngeal:      No angel-laryngeal symptoms, no dysphagia, no voice hoarseness, no cough      Breast:                   No breast symptoms  Cardiovascular:     No cardiovascular symptoms, no CP or palpitations   Pulmonary:            No pulmonary symptoms, no cough or SOB    Gastrointestinal:    No gastrointestinal symptoms, no diarrhea or constipation   Genitourinary:        Uses the restroom 1-3 times a night; all her life   Endocrine:             Occasional chills noticed over wintertime   Neurological:         no headaches, no tremor, no dizziness     Musculoskeletal:   no joint pain, no muscle cramps or spasms    Skin:                       No skin symptoms, no hair loss   Psychological:       No psychological symptoms                Vital Signs     Previous Weights:    Wt Readings from Last 10 Encounters:   08/01/18 99.2 kg (218 lb 11.1 oz)   07/03/18 98 kg (216 lb)   04/24/18 100.8 kg (222 lb 3.6 oz)   12/20/17 101.9 kg (224 lb 10.4 oz)   08/29/17 102.6 kg (226 lb 4.8 oz)   03/27/17 101.2 kg (223 lb)   09/21/16 102.1 kg (225 lb)   06/07/16 102.1 kg (225 lb)   05/18/16 100.7 kg (222 lb)   04/26/16 104.9 kg (231 lb 3.2 oz)                   /84  Pulse 74  Ht 1.632 m (5' 4.25\")  Wt 99.2 kg (218 lb 11.1 oz)  SpO2 100%  BMI 37.25 kg/m2    Physical Exam  General Appearance:  General obesity, no distress noted   Eyes:  conjutivae and extra-ocular motions are normal.                                    pupils round and reactive to light, no lid lag, no stare    HEENT:   oropharynx clear and moist, no JVD, no bruits      no thyromegaly, no palpable nodules   Cardiovascular:  regular rhythm, no murmurs, distal pulse palpable, no edema  Respiratory:       chest clear, no rales, no rhonchi   Musculoskeletal: normal tone and strength  Psychiatric: affect and judgment normal  Skin: Benign abd striae   Neurological: reflexes normal and symmetric, no resting tremor     Lab Results  I reviewed prior lab results " documented in Epic.  Lab Results   Component Value Date    A1C 9.1 08/01/2018    A1C 10.5 (H) 04/19/2018    A1C 10.8 12/20/2017    A1C 10.4 (H) 08/29/2017    A1C 10.1 (H) 03/27/2017     Hemoglobin   Date Value Ref Range Status   04/26/2016 12.4 11.7 - 15.7 g/dL Final     Hematocrit   Date Value Ref Range Status   04/19/2018 42.4 35.0 - 47.0 % Final     Cholesterol   Date Value Ref Range Status   04/19/2018 128 <200 mg/dL Final     Cholesterol/HDL Ratio   Date Value Ref Range Status   09/10/2015 3.2 0.0 - 5.0 Final     HDL Cholesterol   Date Value Ref Range Status   04/19/2018 41 (L) >49 mg/dL Final     LDL Cholesterol Calculated   Date Value Ref Range Status   04/19/2018 30 <100 mg/dL Final     Comment:     Desirable:       <100 mg/dl     VLDL-Cholesterol   Date Value Ref Range Status   09/10/2015 30 0 - 30 mg/dL Final     Triglycerides   Date Value Ref Range Status   04/19/2018 287 (H) <150 mg/dL Final     Comment:     Borderline high:  150-199 mg/dl  High:             200-499 mg/dl  Very high:       >499 mg/dl       Albumin Urine mg/L   Date Value Ref Range Status   04/19/2018 Canceled, Test credited mg/L Final     Comment:     Specimen not received  SPOKE TO BOB SANDOVAL AT  LAB, NO SAMPLE.  ORDER PLACED INTO FUTURE 4.20.2018   0830 [facility]       TSH   Date Value Ref Range Status   07/25/2018 4.88 (H) 0.40 - 4.00 mU/L Final     Last Basic Metabolic Panel:    Sodium   Date Value Ref Range Status   04/19/2018 138 133 - 144 mmol/L Final     Potassium   Date Value Ref Range Status   04/19/2018 4.6 3.4 - 5.3 mmol/L Final     Chloride   Date Value Ref Range Status   04/19/2018 104 94 - 109 mmol/L Final     Calcium   Date Value Ref Range Status   04/19/2018 8.6 8.5 - 10.1 mg/dL Final     Carbon Dioxide   Date Value Ref Range Status   04/19/2018 26 20 - 32 mmol/L Final     Urea Nitrogen   Date Value Ref Range Status   04/19/2018 15 7 - 30 mg/dL Final     Creatinine   Date Value Ref Range Status   04/19/2018 0.74 0.52 - 1.04  mg/dL Final     GFR Estimate   Date Value Ref Range Status   04/19/2018 79 >60 mL/min/1.7m2 Final     Comment:     Non  GFR Calc     Glucose   Date Value Ref Range Status   04/19/2018 231 (H) 70 - 99 mg/dL Final     AST   Date Value Ref Range Status   04/19/2018 21 0 - 45 U/L Final     ALT   Date Value Ref Range Status   04/19/2018 36 0 - 50 U/L Final     Albumin   Date Value Ref Range Status   04/19/2018 3.6 3.4 - 5.0 g/dL Final       Assessment     1. Type 2 diabetes, uncontrolled, with no known microvascular disease with the exception of, possible, mild diabetic neuropathy.  Discussed about the option of trying an SGLT2 inhibitor versus restarting insulin.  The patient agreed to try Invokana.  Side effects were discussed: Genital fungal infections, polyuria, polydipsia, hyperkalemia, worsening cholesterol levels.  The medication might help with the weight loss and improve the blood pressure.     Recommendations:  Continue current dose of metformin, glipizide and Trulicity.  Start Invokana at 100 mg daily  Continue to check the blood sugar in the morning, fasting, before dinner and before lunch  If the blood sugar numbers are consistently elevated after being on Invokana for 2-3 weeks, she was instructed to contact us, to further increase the dose.  If Invoking is too expensive, the plan is to restart Tresiba.    2.  Hashimoto thyroiditis.  Clinically, with exception of fatigue, the patient does not endorse any other signs or symptoms suggestive of hypothyroidism.  Counseled on the etiology of this condition, signs and symptoms of hypo-and hyperthyroidism, treatment with levothyroxine, the correct administration of this medication.  Recommended to start taking 50 mcg levothyroxine daily and have the thyroid hormone levels rechecked in 6 weeks.    3. Hypertension, uncontrolled on 20 mg lisinopril daily.   Advised to check her blood pressure at home.  We are going to reevaluate at her follow-up  appointment, as treatment with Invokana/levothyroxine might help.    4.  Hypercholesterolemia, controlled on statin.     Orders Placed This Encounter   Procedures     T4 free     TSH              Again, thank you for allowing me to participate in the care of your patient.        Sincerely,        Leandra Puri MD

## 2018-08-01 NOTE — PROGRESS NOTES
The patient is seen in f/up.    Janet Ram is a 63 year old female diagnosed with gestational diabetes (diet controled) at age 32 and formally diagnosed with type 2 diabetes, in 2005. Her diabetes has been well controlled, until 2013.  Following 2013, the glycemic control gradually deteriorated. A1c was 10.8, on 12/20/17.  It was 10.5% today.  Since diagnosis, she has been medicated with metformin and glipizide.  She continues to take 1 g regular metformin, twice daily, and 10 mg glipizide, twice a day.  Insulin treatment was started in 2014, initially with Lantus, later with Levemir. At her first appointment here, in 12/2017, Levemir was discontinued and changed with 25 U of Tresiba and Trulicity was added.    As per patient, she never picked up the prescription for Tresiba.  She has been compliant in taking Trulicity at 1.5 mg weekly, since April of this year. Her weight is down 6 pounds since starting Trulicity.  Hemoglobin A1c today was 9.1%, down from 10.5% in April.  The glucometer readings revealed that she checks her blood glucose before meals and at bedtime, approximately twice daily.  Average blood glucose is 174 with a standard deviation of 38 and a range variable between 120 and 287.  Morning blood sugar is consistently elevated, around 160-170.  During daytime, most of the blood sugar numbers are lower, but she does have scattered hyperglycemic episodes throughout the day, in the lower 200 range.      Shortly after her first visit here, she did get a blood pressure cuff but she does not remember the blood pressure numbers.      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 When she is not working, she has 3 meals.  At bedtime, she has a snack (frequently pudding).  At 5 AM in the morning, when she wakes up, she frequently has a Slim fast and a  toast prior to going to work.  She checks her blood glucose around 10:30, while she is at work.    Diabetes complications:  Retinopathy: last eye exam April 2018 - no DR  Nephropathy: no h/o microalbuminuria, GFR in the 70s   Neuropathy: pain in her feet towards the end of the day - since ~ 2013; no numbness or tingling sensation   The lowest BG number she remembers experiencing was in the 60s.   Takes a baby ASA and 40 mg atorvastatin.     Past Medical History   Past Medical History:   Diagnosis Date     Diabetic eye exam (H) 03/12/2015    [facility]     Hypertension      Type II or unspecified type diabetes mellitus without mention of complication, not stated as uncontrolled    Fatty liver   Cholelithiasis   No fractures   Menopause around age 45     Past Surgical [place]   Past Surgical History:   Procedure Laterality Date     COLONOSCOPY N/A 3/17/2015    Procedure: COLONOSCOPY;  Surgeon: Mike Elizondo MD;  Location:  GI     COLONOSCOPY N/A 3/18/2015    Procedure: COMBINED COLONOSCOPY, SINGLE OR MULTIPLE BIOPSY/POLYPECTOMY BY BIOPSY;  Surgeon: Mike Elizondo MD;  Location:  GI   Pineal cyst surgery at age 18    Current Medications   Prescription Medications as of 8/1/2018             Alcohol Swabs (ALCOHOL WIPES) PADS 1 each daily Use to test blood sugar 1 times dailyPlease give patient meter, test trips and lancets as covered by insurance.    ASPIRIN 81 MG OR TABS 1 TABLET DAILY    atorvastatin (LIPITOR) 40 MG tablet TAKE ONE TABLET BY MOUTH ONCE DAILY    B-D U/F 31G X 8 MM insulin pen needle USE ONE  ONCE DAILY OR  AS  DIRECTED    bisacodyl (DULCOLAX) 5 MG EC tablet Take 5 mg by mouth daily as needed for constipation (she took 3 tablets a week ago Friday) Reported on 3/27/2017    blood glucose monitoring (NO BRAND SPECIFIED) meter device kit Use to test blood sugar 1 times daily or as directed. Please give patient meter, test trips and lancets as covered by insurance.    blood glucose  monitoring (NO BRAND SPECIFIED) test strip Use to test blood sugar 1 times daily or as directed. Please give patient meter, test trips and lancets as covered by insurance.    dulaglutide (TRULICITY) 1.5 MG/0.5ML pen Inject 1.5 mg Subcutaneous every 7 days    glipiZIDE (GLUCOTROL XL) 10 MG 24 hr tablet TAKE ONE TABLET BY MOUTH TWICE DAILY    lisinopril (PRINIVIL/ZESTRIL) 20 MG tablet Take 1 tablet (20 mg) by mouth daily    metFORMIN (GLUCOPHAGE) 1000 MG tablet TAKE ONE TABLET BY MOUTH TWICE DAILY WITH MEALS    polyethylene glycol (MIRALAX/GLYCOLAX) powder Take 17 g (1 capful) by mouth daily    senna-docusate (SENOKOT-S;PERICOLACE) 8.6-50 MG per tablet Take 1-2 tablets by mouth 2 times daily as needed for constipation    thin (NO BRAND SPECIFIED) lancets Use to test blood sugar 1 times daily or as directed. Please give patient meter, test trips and lancets as covered by insurance.    insulin degludec (TRESIBA) 100 UNIT/ML pen Inject 25 Units Subcutaneous daily    ORDER FOR DME Equipment being ordered: needles for solostar flex pen          Family History   Problem Relation Age of Onset     DIABETES type 2 dx in her 60s  Mother      C.A.D. Mother      in her 60's     DIABETES type 2 dx in his 60s  Father      C.A.D. Father      in his 50's   No family h/o thyroid disease.     Social History   with 1 child. She denies smoking. She drinks alcohol once every couple of months. 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     Review of Systems   Systemic:               Longstanding fatigue; she sleeps 7-8 hrs a night and she snores; she wakes up rested in the morning; weight down 6 lbs since 12/17  Eye:                       No eye symptoms   Angel-Laryngeal:      No angel-laryngeal symptoms, no dysphagia, no voice hoarseness, no cough      Breast:                   No breast symptoms  Cardiovascular:     No cardiovascular symptoms, no CP or palpitations   Pulmonary:            No pulmonary symptoms,  "no cough or SOB    Gastrointestinal:    No gastrointestinal symptoms, no diarrhea or constipation   Genitourinary:        Uses the restroom 1-3 times a night; all her life   Endocrine:             Occasional chills noticed over wintertime   Neurological:         no headaches, no tremor, no dizziness     Musculoskeletal:   no joint pain, no muscle cramps or spasms    Skin:                       No skin symptoms, no hair loss   Psychological:       No psychological symptoms                Vital Signs     Previous Weights:    Wt Readings from Last 10 Encounters:   08/01/18 99.2 kg (218 lb 11.1 oz)   07/03/18 98 kg (216 lb)   04/24/18 100.8 kg (222 lb 3.6 oz)   12/20/17 101.9 kg (224 lb 10.4 oz)   08/29/17 102.6 kg (226 lb 4.8 oz)   03/27/17 101.2 kg (223 lb)   09/21/16 102.1 kg (225 lb)   06/07/16 102.1 kg (225 lb)   05/18/16 100.7 kg (222 lb)   04/26/16 104.9 kg (231 lb 3.2 oz)                   /84  Pulse 74  Ht 1.632 m (5' 4.25\")  Wt 99.2 kg (218 lb 11.1 oz)  SpO2 100%  BMI 37.25 kg/m2    Physical Exam  General Appearance:  General obesity, no distress noted   Eyes:  conjutivae and extra-ocular motions are normal.                                    pupils round and reactive to light, no lid lag, no stare    HEENT:   oropharynx clear and moist, no JVD, no bruits      no thyromegaly, no palpable nodules   Cardiovascular:  regular rhythm, no murmurs, distal pulse palpable, no edema  Respiratory:       chest clear, no rales, no rhonchi   Musculoskeletal: normal tone and strength  Psychiatric: affect and judgment normal  Skin: Benign abd striae   Neurological: reflexes normal and symmetric, no resting tremor     Lab Results  I reviewed prior lab results documented in Epic.  Lab Results   Component Value Date    A1C 9.1 08/01/2018    A1C 10.5 (H) 04/19/2018    A1C 10.8 12/20/2017    A1C 10.4 (H) 08/29/2017    A1C 10.1 (H) 03/27/2017     Hemoglobin   Date Value Ref Range Status   04/26/2016 12.4 11.7 - 15.7 g/dL " Final     Hematocrit   Date Value Ref Range Status   04/19/2018 42.4 35.0 - 47.0 % Final     Cholesterol   Date Value Ref Range Status   04/19/2018 128 <200 mg/dL Final     Cholesterol/HDL Ratio   Date Value Ref Range Status   09/10/2015 3.2 0.0 - 5.0 Final     HDL Cholesterol   Date Value Ref Range Status   04/19/2018 41 (L) >49 mg/dL Final     LDL Cholesterol Calculated   Date Value Ref Range Status   04/19/2018 30 <100 mg/dL Final     Comment:     Desirable:       <100 mg/dl     VLDL-Cholesterol   Date Value Ref Range Status   09/10/2015 30 0 - 30 mg/dL Final     Triglycerides   Date Value Ref Range Status   04/19/2018 287 (H) <150 mg/dL Final     Comment:     Borderline high:  150-199 mg/dl  High:             200-499 mg/dl  Very high:       >499 mg/dl       Albumin Urine mg/L   Date Value Ref Range Status   04/19/2018 Canceled, Test credited mg/L Final     Comment:     Specimen not received  SPOKE TO BOB SANDOVAL AT ER LAB, NO SAMPLE.  ORDER PLACED INTO FUTURE 4.20.2018   0830 [facility]       TSH   Date Value Ref Range Status   07/25/2018 4.88 (H) 0.40 - 4.00 mU/L Final     Last Basic Metabolic Panel:    Sodium   Date Value Ref Range Status   04/19/2018 138 133 - 144 mmol/L Final     Potassium   Date Value Ref Range Status   04/19/2018 4.6 3.4 - 5.3 mmol/L Final     Chloride   Date Value Ref Range Status   04/19/2018 104 94 - 109 mmol/L Final     Calcium   Date Value Ref Range Status   04/19/2018 8.6 8.5 - 10.1 mg/dL Final     Carbon Dioxide   Date Value Ref Range Status   04/19/2018 26 20 - 32 mmol/L Final     Urea Nitrogen   Date Value Ref Range Status   04/19/2018 15 7 - 30 mg/dL Final     Creatinine   Date Value Ref Range Status   04/19/2018 0.74 0.52 - 1.04 mg/dL Final     GFR Estimate   Date Value Ref Range Status   04/19/2018 79 >60 mL/min/1.7m2 Final     Comment:     Non  GFR Calc     Glucose   Date Value Ref Range Status   04/19/2018 231 (H) 70 - 99 mg/dL Final     AST   Date Value Ref Range  Status   04/19/2018 21 0 - 45 U/L Final     ALT   Date Value Ref Range Status   04/19/2018 36 0 - 50 U/L Final     Albumin   Date Value Ref Range Status   04/19/2018 3.6 3.4 - 5.0 g/dL Final       Assessment     1. Type 2 diabetes, uncontrolled, with no known microvascular disease with the exception of, possible, mild diabetic neuropathy.  Discussed about the option of trying an SGLT2 inhibitor versus restarting insulin.  The patient agreed to try Invokana.  Side effects were discussed: Genital fungal infections, polyuria, polydipsia, hyperkalemia, worsening cholesterol levels.  The medication might help with the weight loss and improve the blood pressure.     Recommendations:  Continue current dose of metformin, glipizide and Trulicity.  Start Invokana at 100 mg daily  Continue to check the blood sugar in the morning, fasting, before dinner and before lunch  If the blood sugar numbers are consistently elevated after being on Invokana for 2-3 weeks, she was instructed to contact us, to further increase the dose.  If Invoking is too expensive, the plan is to restart Tresiba.    2.  Hashimoto thyroiditis.  Clinically, with exception of fatigue, the patient does not endorse any other signs or symptoms suggestive of hypothyroidism.  Counseled on the etiology of this condition, signs and symptoms of hypo-and hyperthyroidism, treatment with levothyroxine, the correct administration of this medication.  Recommended to start taking 50 mcg levothyroxine daily and have the thyroid hormone levels rechecked in 6 weeks.    3. Hypertension, uncontrolled on 20 mg lisinopril daily.   Advised to check her blood pressure at home.  We are going to reevaluate at her follow-up appointment, as treatment with Invokana/levothyroxine might help.    4.  Hypercholesterolemia, controlled on statin.     Orders Placed This Encounter   Procedures     T4 free     TSH

## 2018-08-01 NOTE — MR AVS SNAPSHOT
After Visit Summary   8/1/2018    Janet Ram    MRN: [id number]           Patient Information     Date Of Birth          1955        Visit Information        Provider Department      8/1/2018 10:30 AM Leandra Puri MD [facility]        Today's Diagnoses     Hashimoto's thyroiditis    -  1    Uncontrolled type 2 diabetes mellitus without complication, with long-term current use of insulin (H)        Morbid obesity (H)          Care Instructions    Continue all your antibiabetic medications   If approved by your insurance, start Invokana at 100 mg daily. If the BG numbers do not decrease and you tolerate the medication, contact us in 2-3 weeks, to further increase the dose.   If the Invokana is too expensive, please call us so we can refill your prescription of insulin.   Check your BP and pulse at home after sitting for 15 minutes and contact us with the [facility]-Department of Endocrinology  Claudia Naik RN, Diabetes Educator: [phone]  Clinic Nurses Paulette Hoyos: [phone]-Please call to schedule 3 month appt.  Clinic Fax: [phone]  On-Call Endocrine at the [facility] (after hours/weekends): [phone] option 4  Scheduling Line: [phone]    Appointment Reminders:  * Please bring meter with for staff to download  * If you are due ONLY for an A1C, it is scheduled with the nurse and will be done in clinic. You do not need to schedule a lab appointment. Fasting is not required for an A1C.  * Refill request should be submitted to your pharmacy. They will contact clinic for approval.                Follow-ups after your visit        Follow-up notes from your care team     Return in about 3 months (around 11/1/2018) for labs in 6 weeks.      Your next 10 appointments already scheduled     Aug 08, 2018   Procedure with Josse Bender DO   [facility] ([facility])  "   [address] [place] [id number] [phone]              Future tests that were ordered for you today     Open Future Orders        Priority Expected Expires Ordered    T4 free Routine 9/12/2018 10/20/2018 8/1/2018    TSH Routine 9/12/2018 10/20/2018 8/1/2018            Who to contact     If you have questions or need follow up information about today's clinic visit or your schedule please contact [facility] directly at [phone].  Normal or non-critical lab and imaging results will be communicated to you by MyChart, letter or phone within 4 business days after the clinic has received the results. If you do not hear from us within 7 days, please contact the clinic through MyChart or phone. If you have a critical or abnormal lab result, we will notify you by phone as soon as possible.  Submit refill requests through [employer] or call your pharmacy and they will forward the refill request to us. Please allow 3 business days for your refill to be completed.          Additional Information About Your Visit        Care EveryWhere ID     This is your Care EveryWhere ID. This could be used by other organizations to access your [facility] medical records  [id number]        Your Vitals Were     Pulse Height Pulse Oximetry BMI (Body Mass Index)          74 1.632 m (5' 4.25\") 100% 37.25 kg/m2         Blood Pressure from Last 3 Encounters:   08/01/18 161/84   07/03/18 134/70   04/24/18 144/82    Weight from Last 3 Encounters:   08/01/18 99.2 kg (218 lb 11.1 oz)   07/03/18 98 kg (216 lb)   04/24/18 100.8 kg (222 lb 3.6 oz)              We Performed the Following     Hemoglobin A1c POCT          Today's Medication Changes          These changes are accurate as of 8/1/18 11:11 AM.  If you have any questions, ask your nurse or doctor.               Start taking these medicines.        Dose/Directions    canagliflozin 100 MG tablet   Commonly known as:  INVOKANA   Used for:  Uncontrolled type 2 " diabetes mellitus without complication, with long-term current use of insulin (H)   Started by:  Leandra Puri MD        Dose:  100 mg   Take 1 tablet (100 mg) by mouth every morning (before breakfast)   Quantity:  90 tablet   Refills:  3       levothyroxine 50 MCG tablet   Commonly known as:  SYNTHROID/LEVOTHROID   Used for:  Hashimoto's thyroiditis   Started by:  Leandra Puri MD        Dose:  50 mcg   Take 1 tablet (50 mcg) by mouth daily   Quantity:  90 tablet   Refills:  1         These medicines have changed or have updated prescriptions.        Dose/Directions    glipiZIDE 10 MG 24 hr tablet   Commonly known as:  GLUCOTROL XL   This may have changed:  See the new instructions.   Used for:  Uncontrolled type 2 diabetes mellitus without complication, with long-term current use of insulin (H)   Changed by:  Leandra Puri MD        Dose:  10 mg   Take 1 tablet (10 mg) by mouth 2 times daily   Quantity:  180 tablet   Refills:  3         Stop taking these medicines if you haven't already. Please contact your care team if you have questions.     insulin degludec 100 UNIT/ML pen   Commonly known as:  TRESIBA   Stopped by:  Leandra Puri MD                Where to get your medicines      These medications were sent to [facility] [address] [place] [id number] [address] [address]     Phone:  [phone]     canagliflozin 100 MG tablet    glipiZIDE 10 MG 24 hr tablet    levothyroxine 50 MCG tablet                Primary Care Provider Office Phone # Fax #    Estephanie Mancia PA-C [phone] [address] [place] [address]        Equal Access to Services     [facility]: Hadii clay yen hadradames Soflorencio, waaxda luqadaha, qaybta kaalmada adehussein, august kirk. So [facility] [phone].    ATENCIÓN: Si habla español, tiene a vides disposición servicios gratuitos de asistencia lingüística. Vanessa  al [phone].    We comply with applicable federal civil rights laws and [place] laws. We do not discriminate on the basis of race, color, national origin, age, disability, sex, sexual orientation, or gender identity.            Thank you!     Thank you for choosing [facility]  for your care. Our goal is always to provide you with excellent care. Hearing back from our patients is one way we can continue to improve our services. Please take a few minutes to complete the written survey that you may receive in the mail after your visit with us. Thank you!             Your Updated Medication List - Protect others around you: Learn how to safely use, store and throw away your medicines at www.disposemymeds.org.          This list is accurate as of 8/1/18 11:11 AM.  Always use your most recent med list.                   Brand Name Dispense Instructions for use Diagnosis    Alcohol Wipes Pads     100 each    1 each daily Use to test blood sugar 1 times dailyPlease give patient meter, test trips and lancets as covered by insurance.    Type 2 diabetes, HbA1c goal < 7% (H), Uncontrolled diabetes mellitus type 2 without complications (H)       aspirin 81 MG tablet      1 TABLET DAILY        atorvastatin 40 MG tablet    LIPITOR    90 tablet    TAKE ONE TABLET BY MOUTH ONCE DAILY    Uncontrolled type 2 diabetes mellitus without complication, with long-term current use of insulin (H), Hypertension goal BP (blood pressure) < 140/90, Hyperlipidemia LDL goal <100       B-D U/F 31G X 8 MM   Generic drug:  insulin pen needle     100 each    USE ONE  ONCE DAILY OR  AS  DIRECTED    Type 2 diabetes mellitus, uncontrolled (H)       bisacodyl 5 MG EC tablet    DULCOLAX     Take 5 mg by mouth daily as needed for constipation (she took 3 tablets a week ago Friday) Reported on 3/27/2017        blood glucose monitoring meter device kit    no brand specified    1 kit    Use to test blood sugar 1 times daily or as  directed. Please give patient meter, test trips and lancets as covered by insurance.    Type 2 diabetes, HbA1c goal < 7% (H), Uncontrolled diabetes mellitus type 2 without complications (H)       blood glucose monitoring test strip    no brand specified    100 each    Use to test blood sugar 1 times daily or as directed. Please give patient meter, test trips and lancets as covered by insurance.    Type 2 diabetes, HbA1c goal < 7% (H), Uncontrolled diabetes mellitus type 2 without complications (H)       canagliflozin 100 MG tablet    INVOKANA    90 tablet    Take 1 tablet (100 mg) by mouth every morning (before breakfast)    Uncontrolled type 2 diabetes mellitus without complication, with long-term current use of insulin (H)       dulaglutide 1.5 MG/0.5ML pen    TRULICITY    6 mL    Inject 1.5 mg Subcutaneous every 7 days    Uncontrolled type 2 diabetes mellitus without complication, with long-term current use of insulin (H)       glipiZIDE 10 MG 24 hr tablet    GLUCOTROL XL    180 tablet    Take 1 tablet (10 mg) by mouth 2 times daily    Uncontrolled type 2 diabetes mellitus without complication, with long-term current use of insulin (H)       levothyroxine 50 MCG tablet    SYNTHROID/LEVOTHROID    90 tablet    Take 1 tablet (50 mcg) by mouth daily    Hashimoto's thyroiditis       lisinopril 20 MG tablet    PRINIVIL/ZESTRIL    90 tablet    Take 1 tablet (20 mg) by mouth daily    Benign essential hypertension       metFORMIN 1000 MG tablet    GLUCOPHAGE    180 tablet    TAKE ONE TABLET BY MOUTH TWICE DAILY WITH MEALS    Uncontrolled type 2 diabetes mellitus without complication, with long-term current use of insulin (H)       order for DME     1 Box    Equipment being ordered: needles for solostar flex pen    Type 2 diabetes, HbA1c goal < 7% (H)       polyethylene glycol powder    MIRALAX/GLYCOLAX    850 g    Take 17 g (1 capful) by mouth daily    Obstipation       senna-docusate 8.6-50 MG per tablet     SENOKOT-S;PERICOLACE    100 tablet    Take 1-2 tablets by mouth 2 times daily as needed for constipation    Obstipation       thin lancets    NO BRAND SPECIFIED    1 Box    Use to test blood sugar 1 times daily or as directed. Please give patient meter, test trips and lancets as covered by insurance.    Type 2 diabetes, HbA1c goal < 7% (H), Uncontrolled diabetes mellitus type 2 without complications (H)

## 2018-08-01 NOTE — PATIENT INSTRUCTIONS
Continue all your antibiabetic medications   If approved by your insurance, start Invokana at 100 mg daily. If the BG numbers do not decrease and you tolerate the medication, contact us in 2-3 weeks, to further increase the dose.   If the Invokana is too expensive, please call us so we can refill your prescription of insulin.   Check your BP and pulse at home after sitting for 15 minutes and contact us with the [facility]-Department of Endocrinology  Claudia Naik RN, Diabetes Educator: [phone]  Clinic Nurses Paulette Hoyos: [phone]-Please call to schedule 3 month appt.  Clinic Fax: [phone]  On-Call Endocrine at the [facility] (after hours/weekends): [phone] option 4  Scheduling Line: [phone]    Appointment Reminders:  * Please bring meter with for staff to download  * If you are due ONLY for an A1C, it is scheduled with the nurse and will be done in clinic. You do not need to schedule a lab appointment. Fasting is not required for an A1C.  * Refill request should be submitted to your pharmacy. They will contact clinic for approval.

## 2018-08-08 ENCOUNTER — HOSPITAL ENCOUNTER (OUTPATIENT)
Facility: CLINIC | Age: 63
Discharge: HOME | End: 2018-08-08
Attending: SURGERY | Admitting: SURGERY
Payer: COMMERCIAL

## 2018-08-08 ENCOUNTER — SURGERY (OUTPATIENT)
Age: 63
End: 2018-08-08

## 2018-08-08 VITALS
OXYGEN SATURATION: 98 % | SYSTOLIC BLOOD PRESSURE: 134 MMHG | HEART RATE: 74 BPM | TEMPERATURE: 97.7 F | DIASTOLIC BLOOD PRESSURE: 71 MMHG | RESPIRATION RATE: 16 BRPM

## 2018-08-08 LAB
COLONOSCOPY: NORMAL
GLUCOSE BLDC GLUCOMTR-MCNC: 160 MG/DL (ref 70–99)

## 2018-08-08 PROCEDURE — 45378 DIAGNOSTIC COLONOSCOPY: CPT | Performed by: SURGERY

## 2018-08-08 PROCEDURE — 82962 GLUCOSE BLOOD TEST: CPT

## 2018-08-08 PROCEDURE — 99153 MOD SED SAME PHYS/QHP EA: CPT | Mod: 59 | Performed by: SURGERY

## 2018-08-08 PROCEDURE — 99153 MOD SED SAME PHYS/QHP EA: CPT

## 2018-08-08 PROCEDURE — G0105 COLORECTAL SCRN; HI RISK IND: HCPCS | Performed by: SURGERY

## 2018-08-08 PROCEDURE — G0500 MOD SEDAT ENDO SERVICE >5YRS: HCPCS

## 2018-08-08 PROCEDURE — 25000125 ZZHC RX 250: Performed by: SURGERY

## 2018-08-08 PROCEDURE — 99152 MOD SED SAME PHYS/QHP 5/>YRS: CPT | Mod: 59 | Performed by: SURGERY

## 2018-08-08 PROCEDURE — 25000128 H RX IP 250 OP 636: Performed by: SURGERY

## 2018-08-08 PROCEDURE — 40000296 ZZH STATISTIC ENDO RECOVERY CLASS 1:2 FIRST HOUR: Performed by: SURGERY

## 2018-08-08 RX ORDER — LIDOCAINE 40 MG/G
CREAM TOPICAL
Status: DISCONTINUED | OUTPATIENT
Start: 2018-08-08 | End: 2018-08-08 | Stop reason: HOSPADM

## 2018-08-08 RX ORDER — FENTANYL CITRATE 50 UG/ML
INJECTION, SOLUTION INTRAMUSCULAR; INTRAVENOUS PRN
Status: DISCONTINUED | OUTPATIENT
Start: 2018-08-08 | End: 2018-08-08 | Stop reason: HOSPADM

## 2018-08-08 RX ORDER — ONDANSETRON 2 MG/ML
4 INJECTION INTRAMUSCULAR; INTRAVENOUS
Status: DISCONTINUED | OUTPATIENT
Start: 2018-08-08 | End: 2018-08-08 | Stop reason: HOSPADM

## 2018-08-08 RX ADMIN — MIDAZOLAM 1 MG: 1 INJECTION INTRAMUSCULAR; INTRAVENOUS at 07:32

## 2018-08-08 RX ADMIN — FENTANYL CITRATE 25 MCG: 50 INJECTION, SOLUTION INTRAMUSCULAR; INTRAVENOUS at 07:56

## 2018-08-08 RX ADMIN — FENTANYL CITRATE 25 MCG: 50 INJECTION, SOLUTION INTRAMUSCULAR; INTRAVENOUS at 07:32

## 2018-08-08 RX ADMIN — MIDAZOLAM 2 MG: 1 INJECTION INTRAMUSCULAR; INTRAVENOUS at 07:30

## 2018-08-08 RX ADMIN — FENTANYL CITRATE 50 MCG: 50 INJECTION, SOLUTION INTRAMUSCULAR; INTRAVENOUS at 07:29

## 2018-08-08 RX ADMIN — LIDOCAINE HYDROCHLORIDE 0.1 ML: 10 INJECTION, SOLUTION EPIDURAL; INFILTRATION; INTRACAUDAL; PERINEURAL at 07:04

## 2018-08-08 NOTE — IP AVS SNAPSHOT
[facility]    [address] [address]    Phone:  [phone]                                       After Visit Summary   8/8/2018    Janet Ram    MRN: [id number]           After Visit Summary Signature Page     I have received my discharge instructions, and my questions have been answered. I have discussed any challenges I see with this plan with the nurse or doctor.    ..........................................................................................................................................  Patient/Patient Representative Signature      ..........................................................................................................................................  Patient Representative Print Name and Relationship to Patient    ..................................................               ................................................  Date                                            Time    ..........................................................................................................................................  Reviewed by Signature/Title    ...................................................              ..............................................  Date                                                            Time

## 2018-08-08 NOTE — DISCHARGE INSTRUCTIONS
[facility]    Home Care Following Endoscopy  Dr. Davenport              Activity:    You have just undergone an endoscopic procedure usually performed with conscious sedation.  Do not work or operate machinery (including a car) for at least 12 hours.      I encourage you to walk and attempt to pass this air as soon as possible.    Diet:    Return to the diet you were on before your procedure but eat lightly for the first 12-24 hours.    Drink plenty of water.    Resume any regular medications unless otherwise advised by your physician.  Please begin any new medication prescribed as a result of your procedure as directed by your physician.     If you had any biopsy or polyp removed please refrain from aspirin or aspirin products for 2 days.  If on Coumadin please restart as instructed by your physician.   Pain:    You may take Tylenol as needed for pain.  Expected Recovery:    You can expect some mild abdominal fullness and/or discomfort due to the air used to inflate your intestinal tract.       Call Your Physician if You Have      After Colonoscopy:  o Worsening persisting abdominal pain which is worse with activity.  o Fevers (>101 degrees F), chills or shakes.  o Passage of continued blood with bowel movements.  o   Any questions or concerns about your recovery, please call [phone] or after hours [phone] Nurse Advice Line.    Follow-up Care:      Repeat Colonoscopy in I year per Dr. Davenport due to poor bowel prep. Educated on using a different prep

## 2018-08-08 NOTE — OR NURSING
Verbal report received from Haleigh OR MATEUSZ. GI Phase 2 recovery assumed by Ludy CHILD.  Pt s/p conscious sedation for colonoscopy per .

## 2018-08-08 NOTE — IP AVS SNAPSHOT
MRN:[id number]                      After Visit Summary   8/8/2018    Janet Ram    MRN: [id number]           Thank you!     Thank you for choosing [facility] for your care. Our goal is always to provide you with excellent care. Hearing back from our patients is one way we can continue to improve our services. Please take a few minutes to complete the written survey that you may receive in the mail after you visit with us. Thank you!        Patient Information     Date Of Birth          1955        About your hospital stay     You were admitted on:  August 8, 2018 You last received care in the:  [facility]    You were discharged on:  August 8, 2018       Who to Call     For medical emergencies, please call 911.  For non-urgent questions about your medical care, please call your primary care provider or clinic, [phone]  For questions related to your surgery, please call your surgery clinic        Attending Provider     Provider Specialty    Josse Bender, DO Surgery       Primary Care Provider Office Phone # Fax #    Estephanie Mancia PA-C [phone]      Further instructions from your care team         [facility]    Home Care Following Endoscopy  Dr. Davenport              Activity:    You have just undergone an endoscopic procedure usually performed with conscious sedation.  Do not work or operate machinery (including a car) for at least 12 hours.      I encourage you to walk and attempt to pass this air as soon as possible.    Diet:    Return to the diet you were on before your procedure but eat lightly for the first 12-24 hours.    Drink plenty of water.    Resume any regular medications unless otherwise advised by your physician.  Please begin any new medication prescribed as a result of your procedure as directed by your physician.     If you had any biopsy or polyp removed please refrain from aspirin or aspirin products for  2 days.  If on Coumadin please restart as instructed by your physician.   Pain:    You may take Tylenol as needed for pain.  Expected Recovery:    You can expect some mild abdominal fullness and/or discomfort due to the air used to inflate your intestinal tract.       Call Your Physician if You Have      After Colonoscopy:  o Worsening persisting abdominal pain which is worse with activity.  o Fevers (>101 degrees F), chills or shakes.  o Passage of continued blood with bowel movements.  o   Any questions or concerns about your recovery, please call [phone] or after hours [phone] Nurse Advice Line.    Follow-up Care:      Repeat Colonoscopy in I year per Dr. Davenport due to poor bowel prep. Educated on using a different prep      Pending Results     No orders found from 8/6/2018 to 8/9/2018.            Admission Information     Date & Time Provider Department Dept. Phone    8/8/2018 Josse Bender DO [facility] [phone]      Your Vitals Were     Blood Pressure Pulse Temperature Respirations Pulse Oximetry       134/71 74 97.7  F (36.5  C) (Oral) 16 98%       Care EveryWhere ID     This is your Care EveryWhere ID. This could be used by other organizations to access your [facility] medical records  [id number]        Equal Access to Services     LUIS ANNE : Hadii aad ku hadasho Soprincessali, waaxda luqadaha, qaybta kaalmada adeegyada, august kirk. So [facility] [phone].    ATENCIÓN: Si habla español, tiene a vides disposición servicios gratuitos de asistencia lingüística. Llame al [phone].    We comply with applicable federal civil rights laws and [place] laws. We do not discriminate on the basis of race, color, national origin, age, disability, sex, sexual orientation, or gender identity.               Review of your medicines      UNREVIEWED medicines. Ask your doctor about these medicines        Dose / Directions    aspirin 81 MG tablet         1 TABLET DAILY   Refills:  0       atorvastatin 40 MG tablet   Commonly known as:  LIPITOR   Used for:  Uncontrolled type 2 diabetes mellitus without complication, with long-term current use of insulin (H), Hypertension goal BP (blood pressure) < 140/90, Hyperlipidemia LDL goal <100        TAKE ONE TABLET BY MOUTH ONCE DAILY   Quantity:  90 tablet   Refills:  1       bisacodyl 5 MG EC tablet   Commonly known as:  DULCOLAX        Dose:  5 mg   Take 5 mg by mouth daily as needed for constipation (she took 3 tablets a week ago Friday) Reported on 3/27/2017   Refills:  0       canagliflozin 100 MG tablet   Commonly known as:  INVOKANA        Dose:  100 mg   Take 1 tablet (100 mg) by mouth every morning (before breakfast)   Quantity:  90 tablet   Refills:  3       dulaglutide 1.5 MG/0.5ML pen   Commonly known as:  TRULICITY   Used for:  Uncontrolled type 2 diabetes mellitus without complication, with long-term current use of insulin (H)        Dose:  1.5 mg   Inject 1.5 mg Subcutaneous every 7 days   Quantity:  6 mL   Refills:  3       glipiZIDE 10 MG 24 hr tablet   Commonly known as:  GLUCOTROL XL        Dose:  10 mg   Take 1 tablet (10 mg) by mouth 2 times daily   Quantity:  180 tablet   Refills:  3       levothyroxine 50 MCG tablet   Commonly known as:  SYNTHROID/LEVOTHROID   Used for:  Hashimoto's thyroiditis        Dose:  50 mcg   Take 1 tablet (50 mcg) by mouth daily   Quantity:  90 tablet   Refills:  1       lisinopril 20 MG tablet   Commonly known as:  PRINIVIL/ZESTRIL   Used for:  Benign essential hypertension        Dose:  20 mg   Take 1 tablet (20 mg) by mouth daily   Quantity:  90 tablet   Refills:  3       metFORMIN 1000 MG tablet   Commonly known as:  GLUCOPHAGE   Used for:  Uncontrolled type 2 diabetes mellitus without complication, with long-term current use of insulin (H)        TAKE ONE TABLET BY MOUTH TWICE DAILY WITH MEALS   Quantity:  180 tablet   Refills:  1       polyethylene glycol powder    Commonly known as:  MIRALAX/GLYCOLAX   Used for:  Obstipation        Dose:  1 capful   Take 17 g (1 capful) by mouth daily   Quantity:  850 g   Refills:  0       senna-docusate 8.6-50 MG per tablet   Commonly known as:  SENOKOT-S;PERICOLACE   Used for:  Obstipation        Dose:  1-2 tablet   Take 1-2 tablets by mouth 2 times daily as needed for constipation   Quantity:  100 tablet   Refills:  0         CONTINUE these medicines which have NOT CHANGED        Dose / Directions    Alcohol Wipes Pads   Used for:  Type 2 diabetes, HbA1c goal < 7% (H), Uncontrolled diabetes mellitus type 2 without complications (H)        Dose:  1 each   1 each daily Use to test blood sugar 1 times dailyPlease give patient meter, test trips and lancets as covered by insurance.   Quantity:  100 each   Refills:  3       B-D U/F 31G X 8 MM   Used for:  Type 2 diabetes mellitus, uncontrolled (H)   Generic drug:  insulin pen needle        USE ONE  ONCE DAILY OR  AS  DIRECTED   Quantity:  100 each   Refills:  11       blood glucose monitoring meter device kit   Commonly known as:  no brand specified   Used for:  Type 2 diabetes, HbA1c goal < 7% (H), Uncontrolled diabetes mellitus type 2 without complications (H)        Use to test blood sugar 1 times daily or as directed. Please give patient meter, test trips and lancets as covered by insurance.   Quantity:  1 kit   Refills:  0       blood glucose monitoring test strip   Commonly known as:  no brand specified   Used for:  Type 2 diabetes, HbA1c goal < 7% (H), Uncontrolled diabetes mellitus type 2 without complications (H)        Use to test blood sugar 1 times daily or as directed. Please give patient meter, test trips and lancets as covered by insurance.   Quantity:  100 each   Refills:  3       order for DME   Used for:  Type 2 diabetes, HbA1c goal < 7% (H)        Equipment being ordered: needles for solostar flex pen   Quantity:  1 Box   Refills:  prn       thin lancets   Commonly known as:   NO BRAND SPECIFIED   Used for:  Type 2 diabetes, HbA1c goal < 7% (H), Uncontrolled diabetes mellitus type 2 without complications (H)        Use to test blood sugar 1 times daily or as directed. Please give patient meter, test trips and lancets as covered by insurance.   Quantity:  1 Box   Refills:  3                Protect others around you: Learn how to safely use, store and throw away your medicines at www.disposemymeds.org.             Medication List: This is a list of all your medications and when to take them. Check marks below indicate your daily home schedule. Keep this list as a reference.      Medications           Morning Afternoon Evening Bedtime As Needed    Alcohol Wipes Pads   1 each daily Use to test blood sugar 1 times dailyPlease give patient meter, test trips and lancets as covered by insurance.                                aspirin 81 MG tablet   1 TABLET DAILY                                atorvastatin 40 MG tablet   Commonly known as:  LIPITOR   TAKE ONE TABLET BY MOUTH ONCE DAILY                                B-D U/F 31G X 8 MM   USE ONE  ONCE DAILY OR  AS  DIRECTED   Generic drug:  insulin pen needle                                bisacodyl 5 MG EC tablet   Commonly known as:  DULCOLAX   Take 5 mg by mouth daily as needed for constipation (she took 3 tablets a week ago Friday) Reported on 3/27/2017                                blood glucose monitoring meter device kit   Commonly known as:  no brand specified   Use to test blood sugar 1 times daily or as directed. Please give patient meter, test trips and lancets as covered by insurance.                                blood glucose monitoring test strip   Commonly known as:  no brand specified   Use to test blood sugar 1 times daily or as directed. Please give patient meter, test trips and lancets as covered by insurance.                                canagliflozin 100 MG tablet   Commonly known as:  INVOKANA   Take 1 tablet (100 mg)  by mouth every morning (before breakfast)                                dulaglutide 1.5 MG/0.5ML pen   Commonly known as:  TRULICITY   Inject 1.5 mg Subcutaneous every 7 days                                glipiZIDE 10 MG 24 hr tablet   Commonly known as:  GLUCOTROL XL   Take 1 tablet (10 mg) by mouth 2 times daily                                levothyroxine 50 MCG tablet   Commonly known as:  SYNTHROID/LEVOTHROID   Take 1 tablet (50 mcg) by mouth daily                                lisinopril 20 MG tablet   Commonly known as:  PRINIVIL/ZESTRIL   Take 1 tablet (20 mg) by mouth daily                                metFORMIN 1000 MG tablet   Commonly known as:  GLUCOPHAGE   TAKE ONE TABLET BY MOUTH TWICE DAILY WITH MEALS                                order for DME   Equipment being ordered: needles for solostar flex pen                                polyethylene glycol powder   Commonly known as:  MIRALAX/GLYCOLAX   Take 17 g (1 capful) by mouth daily                                senna-docusate 8.6-50 MG per tablet   Commonly known as:  SENOKOT-S;PERICOLACE   Take 1-2 tablets by mouth 2 times daily as needed for constipation                                thin lancets   Commonly known as:  NO BRAND SPECIFIED   Use to test blood sugar 1 times daily or as directed. Please give patient meter, test trips and lancets as covered by insurance.

## 2018-09-24 DIAGNOSIS — E06.3 HASHIMOTO'S THYROIDITIS: ICD-10-CM

## 2018-09-24 LAB
HBA1C MFR BLD: 8.2 % (ref 0–5.6)
T4 FREE SERPL-MCNC: 1.02 NG/DL (ref 0.76–1.46)
TSH SERPL DL<=0.005 MIU/L-ACNC: 2.87 MU/L (ref 0.4–4)

## 2018-09-24 PROCEDURE — 83036 HEMOGLOBIN GLYCOSYLATED A1C: CPT | Performed by: INTERNAL MEDICINE

## 2018-09-24 PROCEDURE — 36415 COLL VENOUS BLD VENIPUNCTURE: CPT | Performed by: INTERNAL MEDICINE

## 2018-09-24 PROCEDURE — 84439 ASSAY OF FREE THYROXINE: CPT | Performed by: INTERNAL MEDICINE

## 2018-09-24 PROCEDURE — 84443 ASSAY THYROID STIM HORMONE: CPT | Performed by: INTERNAL MEDICINE

## 2018-09-24 NOTE — PROGRESS NOTES
RE:  Leandra Puri MD  [facility]                     Please let her know that hemoglobin A1c has decreased from 9.1 to 8.2.  It was done too soon, only 1 month apart, so it might further decrease.      Left message on PTs vm with message from Dr Puri and to call with questions or concerns.

## 2018-09-24 NOTE — PROGRESS NOTES
Please let her know that hemoglobin A1c has decreased from 9.1 to 8.2.  It was done too soon, only 1 month apart, so it might further decrease.

## 2018-11-01 DIAGNOSIS — I10 HYPERTENSION GOAL BP (BLOOD PRESSURE) < 140/90: ICD-10-CM

## 2018-11-01 DIAGNOSIS — E78.5 HYPERLIPIDEMIA LDL GOAL <100: ICD-10-CM

## 2018-11-01 RX ORDER — ATORVASTATIN CALCIUM 40 MG/1
TABLET, FILM COATED ORAL
Qty: 90 TABLET | Refills: 2 | Status: SHIPPED | OUTPATIENT
Start: 2018-11-01 | End: 2019-01-09

## 2018-11-01 NOTE — TELEPHONE ENCOUNTER
"Requested Prescriptions   Pending Prescriptions Disp Refills     atorvastatin (LIPITOR) 40 MG tablet [Pharmacy Med Name: ATORVASTATIN 40MG   TAB] 90 tablet 1     Sig: TAKE 1 TABLET BY MOUTH ONCE DAILY    Statins Protocol Passed    11/1/2018 12:11 PM       Passed - LDL on file in past 12 months    Recent Labs   Lab Test  04/19/18   0746   LDL  30          Passed - No abnormal creatine kinase in past 12 months    Recent Labs   Lab Test  04/19/18   0746   CKT  40          Passed - Recent (12 mo) or future (30 days) visit within the authorizing provider's specialty    Patient had office visit in the last 12 months or has a visit in the next 30 days with authorizing provider or within the authorizing provider's specialty.  See \"Patient Info\" tab in inbasket, or \"Choose Columns\" in Meds & Orders section of the refill encounter.         Passed - Patient is age 18 or older       Passed - No active pregnancy on record       Passed - No positive pregnancy test in past 12 months        atorvastatin (LIPITOR) 40 MG tablet  Prescription approved per [facility] Refill Protocol.  Shannan Martinez, RN, BSN     "

## 2018-11-27 NOTE — TELEPHONE ENCOUNTER
"Requested Prescriptions   Pending Prescriptions Disp Refills     metFORMIN (GLUCOPHAGE) 1000 MG tablet [Pharmacy Med Name: METFORMIN 1000MG TAB] 180 tablet 1     Sig: TAKE 1 TABLET BY MOUTH TWICE DAILY WITH MEALS    Biguanide Agents Passed    11/27/2018  5:31 AM       Passed - Blood pressure less than 140/90 in past 6 months    BP Readings from Last 3 Encounters:   08/08/18 134/71   08/01/18 161/84   07/03/18 134/70          Passed - Patient has documented LDL within the past 12 mos.    Recent Labs   Lab Test  04/19/18   0746   LDL  30          Passed - Patient has had a Microalbumin in the past 15 mos.    Recent Labs   Lab Test  04/19/18   0747   MICROL  Canceled, Test credited   UMALCR  Unable to calculate          Passed - Patient is age 10 or older       Passed - Patient has documented A1c within the specified period of time.    If HgbA1C is 8 or greater, it needs to be on file within the past 3 months.  If less than 8, must be on file within the past 6 months.     Recent Labs   Lab Test  09/24/18   0750   A1C  8.2*          Passed - Patient's CR is NOT>1.4 OR Patient's EGFR is NOT<45 within past 12 mos.    Recent Labs   Lab Test  04/19/18   0746   GFRESTIMATED  79   GFRESTBLACK  >90     Recent Labs   Lab Test  04/19/18   0746   CR  0.74          Passed - Patient does NOT have a diagnosis of CHF.       Passed - Patient is not pregnant       Passed - Patient has not had a positive pregnancy test within the past 12 mos.        Passed - Recent (6 mo) or future (30 days) visit within the authorizing provider's specialty    Patient had office visit in the last 6 months or has a visit in the next 30 days with authorizing provider or within the authorizing provider's specialty.  See \"Patient Info\" tab in inbasket, or \"Choose Columns\" in Meds & Orders section of the refill encounter.            Last OV 07/03/2018  Last filled 05/07/2018    Has been seeing ENDO.  Prescription approved per [facility] Refill Protocol.  Hang " Anderson RN, BSN

## 2018-11-28 ENCOUNTER — OFFICE VISIT (OUTPATIENT)
Dept: URGENT CARE | Facility: RETAIL CLINIC | Age: 63
End: 2018-11-28
Payer: COMMERCIAL

## 2018-11-28 VITALS
HEART RATE: 73 BPM | OXYGEN SATURATION: 97 % | SYSTOLIC BLOOD PRESSURE: 151 MMHG | DIASTOLIC BLOOD PRESSURE: 83 MMHG | TEMPERATURE: 97.6 F

## 2018-11-28 DIAGNOSIS — I10 ELEVATED BLOOD PRESSURE READING IN OFFICE WITH DIAGNOSIS OF HYPERTENSION: ICD-10-CM

## 2018-11-28 DIAGNOSIS — J06.9 VIRAL URI WITH COUGH: Primary | ICD-10-CM

## 2018-11-28 PROCEDURE — 99203 OFFICE O/P NEW LOW 30 MIN: CPT | Performed by: PHYSICIAN ASSISTANT

## 2018-11-28 NOTE — PROGRESS NOTES
Chief Complaint   Patient presents with     Nasal Congestion     since saturday am, nasal congestion and drainage, no fevers     Cough     sneezing and cough since saturday am      SUBJECTIVE:   Janet Ram is a 63 year old female presenting with a chief complaint of cough and nasal congestion  Onset of symptoms was 4 day(s) ago.  Course of illness is same.    Severity mild  Current and Associated symptoms: cough and nasal congestion  Treatment measures tried include Fluids.  Predisposing factors include None.  PMH: Diabetes, hypertension    Past Medical History:   Diagnosis Date     Diabetic eye exam (H) 03/12/2015    [facility]     Hypertension      Type II or unspecified type diabetes mellitus without mention of complication, not stated as uncontrolled      Current Outpatient Prescriptions   Medication Sig Dispense Refill     Alcohol Swabs (ALCOHOL WIPES) PADS 1 each daily Use to test blood sugar 1 times dailyPlease give patient meter, test trips and lancets as covered by insurance. 100 each 3     ASPIRIN 81 MG OR TABS 1 TABLET DAILY       atorvastatin (LIPITOR) 40 MG tablet TAKE 1 TABLET BY MOUTH ONCE DAILY 90 tablet 2     B-D U/F 31G X 8 MM insulin pen needle USE ONE  ONCE DAILY OR  AS  DIRECTED 100 each 11     blood glucose monitoring (NO BRAND SPECIFIED) meter device kit Use to test blood sugar 1 times daily or as directed. Please give patient meter, test trips and lancets as covered by insurance. 1 kit 0     blood glucose monitoring (NO BRAND SPECIFIED) test strip Use to test blood sugar 1 times daily or as directed. Please give patient meter, test trips and lancets as covered by insurance. 100 each 3     dulaglutide (TRULICITY) 1.5 MG/0.5ML pen Inject 1.5 mg Subcutaneous every 7 days 6 mL 3     glipiZIDE (GLUCOTROL XL) 10 MG 24 hr tablet Take 1 tablet (10 mg) by mouth 2 times daily 180 tablet 3     levothyroxine (SYNTHROID/LEVOTHROID) 50 MCG tablet Take 1 tablet (50 mcg) by mouth daily 90  tablet 1     lisinopril (PRINIVIL/ZESTRIL) 20 MG tablet Take 1 tablet (20 mg) by mouth daily 90 tablet 3     metFORMIN (GLUCOPHAGE) 1000 MG tablet TAKE 1 TABLET BY MOUTH TWICE DAILY WITH MEALS 180 tablet 0     polyethylene glycol (MIRALAX/GLYCOLAX) powder Take 17 g (1 capful) by mouth daily 850 g 0     thin (NO BRAND SPECIFIED) lancets Use to test blood sugar 1 times daily or as directed. Please give patient meter, test trips and lancets as covered by insurance. 1 Box 3     bisacodyl (DULCOLAX) 5 MG EC tablet Take 5 mg by mouth daily as needed for constipation (she took 3 tablets a week ago Friday) Reported on 3/27/2017       canagliflozin (INVOKANA) 100 MG tablet Take 1 tablet (100 mg) by mouth every morning (before breakfast) (Patient not taking: Reported on 11/28/2018) 90 tablet 3     ORDER FOR DME Equipment being ordered: needles for solostar flex pen (Patient not taking: Reported on 11/28/2018) 1 Box prn     senna-docusate (SENOKOT-S;PERICOLACE) 8.6-50 MG per tablet Take 1-2 tablets by mouth 2 times daily as needed for constipation (Patient not taking: Reported on 11/28/2018) 100 tablet 0        Allergies   Allergen Reactions     Penicillins      Too young to remember reaction        Social History   Substance Use Topics     Smoking status: Never Smoker     Smokeless tobacco: Never Used     Alcohol use 0.0 oz/week     0 Standard drinks or equivalent per week      Comment: rare       ROS:  CONSTITUTIONAL:NEGATIVE for fever, chills  ENT/MOUTH: POSITIVE for nasal congestion and rhinorrhea-clear and NEGATIVE for sore throat  RESP:POSITIVE for cough-non productive and NEGATIVE for wheezing    OBJECTIVE:  /83 (BP Location: Left arm)  Pulse 73  Temp 97.6  F (36.4  C) (Oral)  SpO2 97%  GENERAL APPEARANCE: healthy, alert and no distress  EYES: conjunctiva clear  HENT: ear canals and TM's normal.  Nose and mouth without ulcers, erythema or lesions  NECK: supple, nontender, no lymphadenopathy  RESP: lungs clear  to auscultation - no rales, rhonchi or wheezes  CV: regular rates and rhythm, normal S1 S2, no murmur noted  SKIN: no suspicious lesions or rashes    ASSESSMENT:  (J06.9,  B97.89) Viral URI with cough  (primary encounter diagnosis)  (I10) Elevated blood pressure reading in office with diagnosis of hypertension     PLAN:  Viral chest colds can last for several weeks  No indication for antibiotics discussed.    Rest! Your body needs more rest to heal.  Drink plenty of fluids (warm fluids like tea or soup are soothing and reduce cough)  Sit in the bathroom with a hot shower running and breathe in the steam.  Honey may soothe your sore throat and help manage your cough- may take straight or in warm water with lemon juice.  Avoid smoke from cigarettes, fireplace, bonfire etc.  Take Tylenol or an NSAID such as ibuprofen (Advil) or naproxen (Aleve) as needed for pain.  Delsym (dextromethorphan) is a 12 hour over the counter cough medication    OR Mucinex or Robitussin (guiafenesin) thin mucus and may help it to loosen more quickly  Good handwashing is the best way to prevent spread of the common cold.  Present to emergency room if you develop trouble breathing, swallowing or cough-up blood.  Follow up with your primary care provider if symptoms worsen or fail to improve as expected    Discussed elevated blood pressure today and rechecking blood pressure.    Faviola Leigh PA-C  [facility]

## 2018-11-28 NOTE — PATIENT INSTRUCTIONS
Viral chest colds can last for several weeks  No indication for antibiotics discussed.    Rest! Your body needs more rest to heal.  Drink plenty of fluids (warm fluids like tea or soup are soothing and reduce cough)  Sit in the bathroom with a hot shower running and breathe in the steam.  Honey may soothe your sore throat and help manage your cough- may take straight or in warm water with lemon juice.  Avoid smoke from cigarettes, fireplace, bonfire etc.  Take Tylenol or an NSAID such as ibuprofen (Advil) or naproxen (Aleve) as needed for pain.  Delsym (dextromethorphan) is a 12 hour over the counter cough medication    OR Mucinex or Robitussin (guiafenesin) thin mucus and may help it to loosen more quickly  Good handwashing is the best way to prevent spread of the common cold.  Present to emergency room if you develop trouble breathing, swallowing or cough-up blood.  Follow up with your primary care provider if symptoms worsen or fail to improve as expected

## 2018-11-28 NOTE — MR AVS SNAPSHOT
"              After Visit Summary   11/28/2018    Janet Ram    MRN: [id number]           Patient Information     Date Of Birth          1955        Visit Information        Provider Department      11/28/2018 11:30 AM Faviola Leigh PA-C [facility]        Today's Diagnoses     Viral URI with cough    -  1      Care Instructions    Viral chest colds can last for several weeks  No indication for antibiotics discussed.    Rest! Your body needs more rest to heal.  Drink plenty of fluids (warm fluids like tea or soup are soothing and reduce cough)  Sit in the bathroom with a hot shower running and breathe in the steam.  Honey may soothe your sore throat and help manage your cough- may take straight or in warm water with lemon juice.  Avoid smoke from cigarettes, fireplace, bonfire etc.  Take Tylenol or an NSAID such as ibuprofen (Advil) or naproxen (Aleve) as needed for pain.  Delsym (dextromethorphan) is a 12 hour over the counter cough medication    OR Mucinex or Robitussin (guiafenesin) thin mucus and may help it to loosen more quickly  Good handwashing is the best way to prevent spread of the common cold.  Present to emergency room if you develop trouble breathing, swallowing or cough-up blood.  Follow up with your primary care provider if symptoms worsen or fail to improve as expected            Follow-ups after your visit        Who to contact     You can reach your care team any time of the day by calling [phone].  Notification of test results:  If you have an abnormal lab result, we will notify you by phone as soon as possible.         Additional Information About Your Visit        Project [employer] Information     [employer] lets you send messages to your doctor, view your test results, renew your prescriptions, schedule appointments and more. To sign up, go to [email or web address] . Click on \"Log in\" on the left side of the screen, which will take you to the Welcome page. Then " "click on \"Sign up Now\" on the right side of the page.     You will be asked to enter the access code listed below, as well as some personal information. Please follow the directions to create your username and password.     Your access code is: [email or web address]  Expires: 2019 11:56 AM     Your access code will  in 90 days. If you need help or a new code, please call your [place] clinic or [phone].        Care EveryWhere ID     This is your Care EveryWhere ID. This could be used by other organizations to access your [place] medical records  [id number]        Your Vitals Were     Pulse Temperature Pulse Oximetry             73 97.6  F (36.4  C) (Oral) 97%          Blood Pressure from Last 3 Encounters:   18 151/83   18 134/71   18 161/84    Weight from Last 3 Encounters:   18 218 lb 11.1 oz (99.2 kg)   18 216 lb (98 kg)   18 222 lb 3.6 oz (100.8 kg)              Today, you had the following     No orders found for display       Primary Care Provider Office Phone # Fax #    Estephanie Mancia PA-C [phone] [address] [place] [address]        Equal Access to Services     [facility]: Hadii clay yen hadasho Soflorencio, waaxda luqadaha, qaybta kaalmada adeegyada, august rodrigez . So [facility] [phone].    ATENCIÓN: Si habla español, tiene a vides disposición servicios gratuitos de asistencia lingüística. Llame al [phone].    We comply with applicable federal civil rights laws and [place] laws. We do not discriminate on the basis of race, color, national origin, age, disability, sex, sexual orientation, or gender identity.            Thank you!     Thank you for choosing [facility]  for your care. Our goal is always to provide you with excellent care. Hearing back from our patients is one way we can continue to improve our services. Please take a few minutes to complete the written survey that you " may receive in the mail after your visit with us. Thank you!             Your Updated Medication List - Protect others around you: Learn how to safely use, store and throw away your medicines at www.disposemymeds.org.          This list is accurate as of 11/28/18 11:56 AM.  Always use your most recent med list.                   Brand Name Dispense Instructions for use Diagnosis    Alcohol Wipes Pads     100 each    1 each daily Use to test blood sugar 1 times dailyPlease give patient meter, test trips and lancets as covered by insurance.    Type 2 diabetes, HbA1c goal < 7% (H), Uncontrolled diabetes mellitus type 2 without complications (H)       aspirin 81 MG tablet    ASA     1 TABLET DAILY        atorvastatin 40 MG tablet    LIPITOR    90 tablet    TAKE 1 TABLET BY MOUTH ONCE DAILY    Uncontrolled type 2 diabetes mellitus without complication, with long-term current use of insulin (H), Hypertension goal BP (blood pressure) < 140/90, Hyperlipidemia LDL goal <100       B-D U/F 31G X 8 MM miscellaneous   Generic drug:  insulin pen needle     100 each    USE ONE  ONCE DAILY OR  AS  DIRECTED    Type 2 diabetes mellitus, uncontrolled (H)       bisacodyl 5 MG EC tablet    DULCOLAX     Take 5 mg by mouth daily as needed for constipation (she took 3 tablets a week ago Friday) Reported on 3/27/2017        blood glucose monitoring meter device kit    NO BRAND SPECIFIED    1 kit    Use to test blood sugar 1 times daily or as directed. Please give patient meter, test trips and lancets as covered by insurance.    Type 2 diabetes, HbA1c goal < 7% (H), Uncontrolled diabetes mellitus type 2 without complications (H)       blood glucose monitoring test strip    NO BRAND SPECIFIED    100 each    Use to test blood sugar 1 times daily or as directed. Please give patient meter, test trips and lancets as covered by insurance.    Type 2 diabetes, HbA1c goal < 7% (H), Uncontrolled diabetes mellitus type 2 without complications (H)        canagliflozin 100 MG tablet    INVOKANA    90 tablet    Take 1 tablet (100 mg) by mouth every morning (before breakfast)        dulaglutide 1.5 MG/0.5ML pen    TRULICITY    6 mL    Inject 1.5 mg Subcutaneous every 7 days    Uncontrolled type 2 diabetes mellitus without complication, with long-term current use of insulin (H)       glipiZIDE 10 MG 24 hr tablet    GLUCOTROL XL    180 tablet    Take 1 tablet (10 mg) by mouth 2 times daily        levothyroxine 50 MCG tablet    SYNTHROID/LEVOTHROID    90 tablet    Take 1 tablet (50 mcg) by mouth daily    Hashimoto's thyroiditis       lisinopril 20 MG tablet    PRINIVIL/ZESTRIL    90 tablet    Take 1 tablet (20 mg) by mouth daily    Benign essential hypertension       metFORMIN 1000 MG tablet    GLUCOPHAGE    180 tablet    TAKE 1 TABLET BY MOUTH TWICE DAILY WITH MEALS    Uncontrolled type 2 diabetes mellitus without complication, with long-term current use of insulin (H)       order for DME     1 Box    Equipment being ordered: needles for solostar flex pen    Type 2 diabetes, HbA1c goal < 7% (H)       polyethylene glycol powder    MIRALAX/GLYCOLAX    850 g    Take 17 g (1 capful) by mouth daily    Obstipation       senna-docusate 8.6-50 MG tablet    SENOKOT-S/PERICOLACE    100 tablet    Take 1-2 tablets by mouth 2 times daily as needed for constipation    Obstipation       thin lancets    NO BRAND SPECIFIED    1 Box    Use to test blood sugar 1 times daily or as directed. Please give patient meter, test trips and lancets as covered by insurance.    Type 2 diabetes, HbA1c goal < 7% (H), Uncontrolled diabetes mellitus type 2 without complications (H)

## 2018-12-14 ENCOUNTER — TELEPHONE (OUTPATIENT)
Dept: FAMILY MEDICINE | Facility: OTHER | Age: 63
End: 2018-12-14

## 2018-12-14 NOTE — TELEPHONE ENCOUNTER
Summary:    Patient is due/failing the following:   Diabetic follow up with Endocrinology  and A1C    Action needed:   Patient needs office visit for follow up.    Type of outreach:    Phone, left message for patient to call back.     Questions for provider review:    None                                                                                                                                    Fadumo Nelson       Chart routed to Care Team .          Panel Management Review      Patient has the following on her problem list:     Diabetes    ASA: Passed    Last A1C  Lab Results   Component Value Date    A1C 8.2 09/24/2018    A1C 9.1 08/01/2018    A1C 10.5 04/19/2018    A1C 10.8 12/20/2017    A1C 10.4 08/29/2017     A1C tested: FAILED    Last LDL:    Lab Results   Component Value Date    CHOL 128 04/19/2018     Lab Results   Component Value Date    HDL 41 04/19/2018     Lab Results   Component Value Date    LDL 30 04/19/2018     Lab Results   Component Value Date    TRIG 287 04/19/2018     Lab Results   Component Value Date    CHOLHDLRATIO 3.2 09/10/2015     Lab Results   Component Value Date    NHDL 87 04/19/2018       Is the patient on a Statin? YES             Is the patient on Aspirin? YES    Medications     HMG CoA Reductase Inhibitors    atorvastatin (LIPITOR) 40 MG tablet    Salicylates    ASPIRIN 81 MG OR TABS          Last three blood pressure readings:  BP Readings from Last 3 Encounters:   11/28/18 151/83   08/08/18 134/71   08/01/18 161/84            Tobacco History:     History   Smoking Status     Never Smoker   Smokeless Tobacco     Never Used         Hypertension   Last three blood pressure readings:  BP Readings from Last 3 Encounters:   11/28/18 151/83   08/08/18 134/71   08/01/18 161/84     Blood pressure: FAILED    HTN Guidelines:  Age 18-59 BP range:  Less than 140/90  Age 60-85 with Diabetes:  Less than 140/90  Age 60-85 without Diabetes:  less than 150/90      Composite cancer  screening  Chart review shows that this patient is due/due soon for the following None

## 2019-01-04 NOTE — PROGRESS NOTES
SUBJECTIVE:   Janet Ram is a 63 year old female who presents to clinic today for the following health issues:      HPI  Diabetes Follow-up--     Patient is checking blood sugars: once to twice daily.  They are much improved now that she is using weekly Trulicity.  She did not tolerate Invokana, it caused a lot of vaginal symptoms and pain to wear is difficult even to walk.  Blood sugars are at the lowest in the 80s and at the highest on average around 130s, she has a rare 150s or 200s though she typically has a specific reason as to why this is doing better overall.  She now has hypothyroidism and finds that her levothyroxine has helped her to lose weight which has helped blood pressure and blood sugars as well.    Diabetic concerns: None     Symptoms of hypoglycemia (low blood sugar): none     Paresthesias (numbness or burning in feet) or sores: No     Date of last diabetic eye exam: last year-normal w/ no changes from previous     Diabetes Management Resources    Hyperlipidemia Follow-Up      Rate your low fat/cholesterol diet?: good    Taking statin?  Yes, possible muscle aches from statin    Other lipid medications/supplements?:  none    Hypertension Follow-up      Outpatient blood pressures are not being checked.    Low Salt Diet: no added salt    BP Readings from Last 2 Encounters:   01/09/19 128/68   11/28/18 151/83     Hemoglobin A1C (%)   Date Value   09/24/2018 8.2 (H)   08/01/2018 9.1     LDL Cholesterol Calculated (mg/dL)   Date Value   04/19/2018 30   08/29/2017 66     Problem list and histories reviewed & adjusted, as indicated.  Additional history: as documented        Recent Labs   Lab Test 09/24/18  0750 08/01/18 07/25/18  0820 04/19/18  0748 04/19/18  0746  08/29/17  0851  09/21/16  1001 06/01/16  1024   A1C 8.2* 9.1  --  10.5*  --    < > 10.4*   < > 9.3*  --    LDL  --   --   --   --  30  --  66  --  66  --    HDL  --   --   --   --  41*  --  46*  --  49*  --    TRIG  --   --   --   --   "287*  --  201*  --  205*  --    ALT  --   --   --   --  36  --  28  --   --  39   CR  --   --   --   --  0.74  --  0.82  --   --  0.74   GFRESTIMATED  --   --   --   --  79  --  70  --   --  80   GFRESTBLACK  --   --   --   --  >90  --  85  --   --  >90  African American GFR Calc     POTASSIUM  --   --   --   --  4.6  --  4.4  --   --  4.2   TSH 2.87  --  4.88* 6.27*  --   --   --   --  3.83  --     < > = values in this interval not displayed.      BP Readings from Last 3 Encounters:   01/09/19 128/68   11/28/18 151/83   08/08/18 134/71    Wt Readings from Last 3 Encounters:   01/09/19 96.2 kg (212 lb)   08/01/18 99.2 kg (218 lb 11.1 oz)   07/03/18 98 kg (216 lb)                  Labs reviewed in EPIC    ROS:  CONSTITUTIONAL: NEGATIVE for fever, chills, change in weight  ENT/MOUTH: had viral infection, now mainly resolved   RESP: NEGATIVE for significant cough or SOB  CV: NEGATIVE for chest pain, palpitations or peripheral edema  GI: NEGATIVE for nausea, abdominal pain, heartburn, or change in bowel habits  NEURO: NEGATIVE for weakness, dizziness or paresthesias  PSYCHIATRIC: NEGATIVE for changes in mood or affect    OBJECTIVE:     /68   Pulse 76   Temp 97.6  F (36.4  C) (Temporal)   Resp 16   Ht 1.638 m (5' 4.5\")   Wt 96.2 kg (212 lb)   LMP  (LMP Unknown)   BMI 35.83 kg/m    Body mass index is 35.83 kg/m .  GENERAL: healthy, alert and no distress  NECK: no adenopathy, no asymmetry, masses, or scars and thyroid normal to palpation  RESP: lungs clear to auscultation - no rales, rhonchi or wheezes  CV: regular rate and rhythm, normal S1 S2, no S3 or S4, no murmur, click or rub, no peripheral edema and peripheral pulses strong  ABDOMEN: soft, nontender, no hepatosplenomegaly, no masses and bowel sounds normal  MS: no gross musculoskeletal defects noted, no edema  SKIN: no suspicious lesions or rashes  PSYCH: mentation appears normal, affect normal/bright  Diabetic foot exam: normal DP and PT pulses, no " trophic changes or ulcerative lesions, normal sensory exam and normal monofilament exam    Diagnostic Test Results:  Results for orders placed or performed in visit on 01/09/19 (from the past 24 hour(s))   Hemoglobin A1c   Result Value Ref Range    Hemoglobin A1C 8.1 (H) 0 - 5.6 %       ASSESSMENT/PLAN:         1. Uncontrolled type 2 diabetes mellitus without complication, with long-term current use of insulin (H)  Minimal improvement, she is losing weight, she is confident she can further improve things with improved diet and exercise.  - FOOT EXAM  NO CHARGE [28526.031]  - atorvastatin (LIPITOR) 40 MG tablet; Take 1 tablet (40 mg) by mouth daily  Dispense: 90 tablet; Refill: 3  - glipiZIDE (GLUCOTROL XL) 10 MG 24 hr tablet; Take 1 tablet (10 mg) by mouth 2 times daily  Dispense: 180 tablet; Refill: 3  - metFORMIN (GLUCOPHAGE) 1000 MG tablet; TAKE 1 TABLET BY MOUTH TWICE DAILY WITH MEALS  Dispense: 180 tablet; Refill: 1  - dulaglutide (TRULICITY) 1.5 MG/0.5ML pen; Inject 1.5 mg Subcutaneous every 7 days  Dispense: 6 mL; Refill: 3  - blood glucose (NO BRAND SPECIFIED) test strip; Use to test blood sugar 1 times daily or as directed. Please give patient meter, test trips and lancets as covered by insurance.  Dispense: 100 each; Refill: 3  - Hemoglobin A1c  - **Comprehensive metabolic panel FUTURE anytime; Future  - Albumin Random Urine Quantitative with Creat Ratio; Future    2. Hypertension goal BP (blood pressure) < 140/90  At goal continue current meds  - **Comprehensive metabolic panel FUTURE anytime; Future  - Albumin Random Urine Quantitative with Creat Ratio; Future    3. Screening for diabetic peripheral neuropathy    - FOOT EXAM  NO CHARGE [87046.934]    4. Hyperlipidemia LDL goal <100  Labs are due in 3 months ordered for future  - atorvastatin (LIPITOR) 40 MG tablet; Take 1 tablet (40 mg) by mouth daily  Dispense: 90 tablet; Refill: 3  - Lipid panel reflex to direct LDL Fasting; Future  - **Comprehensive  metabolic panel FUTURE anytime; Future    5. Benign essential hypertension    - lisinopril (PRINIVIL/ZESTRIL) 20 MG tablet; Take 1 tablet (20 mg) by mouth daily  Dispense: 90 tablet; Refill: 3    6. Hashimoto's thyroiditis    - levothyroxine (SYNTHROID/LEVOTHROID) 50 MCG tablet; Take 1 tablet (50 mcg) by mouth daily  Dispense: 90 tablet; Refill: 1  - **TSH with free T4 reflex FUTURE anytime; Future    7. Need for prophylactic vaccination and inoculation against influenza  already done at work    8. Screening for HIV (human immunodeficiency virus)  declined      We have decided that she will continue to be seen here in [place] at this point for her diabetes, I will recheck her again in 3 months in office with labs a few days before    Estephanie Mancia PA-C  [facility]

## 2019-01-09 ENCOUNTER — OFFICE VISIT (OUTPATIENT)
Dept: FAMILY MEDICINE | Facility: OTHER | Age: 64
End: 2019-01-09
Payer: COMMERCIAL

## 2019-01-09 VITALS
BODY MASS INDEX: 35.32 KG/M2 | DIASTOLIC BLOOD PRESSURE: 68 MMHG | HEIGHT: 65 IN | HEART RATE: 76 BPM | WEIGHT: 212 LBS | RESPIRATION RATE: 16 BRPM | TEMPERATURE: 97.6 F | SYSTOLIC BLOOD PRESSURE: 128 MMHG

## 2019-01-09 DIAGNOSIS — E78.5 HYPERLIPIDEMIA LDL GOAL <100: ICD-10-CM

## 2019-01-09 DIAGNOSIS — E06.3 HASHIMOTO'S THYROIDITIS: ICD-10-CM

## 2019-01-09 DIAGNOSIS — I10 HYPERTENSION GOAL BP (BLOOD PRESSURE) < 140/90: ICD-10-CM

## 2019-01-09 DIAGNOSIS — Z23 NEED FOR PROPHYLACTIC VACCINATION AND INOCULATION AGAINST INFLUENZA: ICD-10-CM

## 2019-01-09 DIAGNOSIS — Z13.89 SCREENING FOR DIABETIC PERIPHERAL NEUROPATHY: ICD-10-CM

## 2019-01-09 DIAGNOSIS — I10 BENIGN ESSENTIAL HYPERTENSION: ICD-10-CM

## 2019-01-09 DIAGNOSIS — Z11.4 SCREENING FOR HIV (HUMAN IMMUNODEFICIENCY VIRUS): ICD-10-CM

## 2019-01-09 LAB — HBA1C MFR BLD: 8.1 % (ref 0–5.6)

## 2019-01-09 PROCEDURE — 83036 HEMOGLOBIN GLYCOSYLATED A1C: CPT | Performed by: PHYSICIAN ASSISTANT

## 2019-01-09 PROCEDURE — 99207 C FOOT EXAM  NO CHARGE: CPT | Performed by: PHYSICIAN ASSISTANT

## 2019-01-09 PROCEDURE — 36415 COLL VENOUS BLD VENIPUNCTURE: CPT | Performed by: PHYSICIAN ASSISTANT

## 2019-01-09 PROCEDURE — 99214 OFFICE O/P EST MOD 30 MIN: CPT | Performed by: PHYSICIAN ASSISTANT

## 2019-01-09 RX ORDER — LISINOPRIL 20 MG/1
20 TABLET ORAL DAILY
Qty: 90 TABLET | Refills: 3 | Status: SHIPPED | OUTPATIENT
Start: 2019-01-09 | End: 2019-10-16

## 2019-01-09 RX ORDER — LEVOTHYROXINE SODIUM 50 UG/1
50 TABLET ORAL DAILY
Qty: 90 TABLET | Refills: 1 | Status: SHIPPED | OUTPATIENT
Start: 2019-01-09 | End: 2019-10-16

## 2019-01-09 RX ORDER — ATORVASTATIN CALCIUM 40 MG/1
40 TABLET, FILM COATED ORAL DAILY
Qty: 90 TABLET | Refills: 3 | Status: SHIPPED | OUTPATIENT
Start: 2019-01-09 | End: 2019-10-16

## 2019-01-09 RX ORDER — GLIPIZIDE 10 MG/1
10 TABLET, FILM COATED, EXTENDED RELEASE ORAL 2 TIMES DAILY
Qty: 180 TABLET | Refills: 3 | Status: SHIPPED | OUTPATIENT
Start: 2019-01-09 | End: 2019-10-16

## 2019-01-09 ASSESSMENT — MIFFLIN-ST. JEOR: SCORE: 1509.57

## 2019-01-09 ASSESSMENT — PAIN SCALES - GENERAL: PAINLEVEL: NO PAIN (0)

## 2019-01-10 ENCOUNTER — TELEPHONE (OUTPATIENT)
Dept: FAMILY MEDICINE | Facility: OTHER | Age: 64
End: 2019-01-10

## 2019-01-10 NOTE — TELEPHONE ENCOUNTER
Left message for patient to return call to clinic. When call is returned please see message below.     Sacha Monaco,

## 2019-01-10 NOTE — TELEPHONE ENCOUNTER
Please call patient, your hemoglobin A1c is still higher than our goal, it is a little bit better.  It is now 8.1.  Our initial goal is to be under 8 with a secondary goal being less than 7.  At this time she is on the max dosing of her current medications.  I was happy to hear that she is getting herself back on track with her diabetic care.  At this point we could recheck her hemoglobin A1c in 3 months or have her see diabetic education for their help in improving her control.  What is her preference?  Estephanie Mancia PA-C

## 2019-02-25 ENCOUNTER — HOSPITAL ENCOUNTER (OUTPATIENT)
Dept: MAMMOGRAPHY | Facility: CLINIC | Age: 64
Discharge: HOME | End: 2019-02-25
Attending: PHYSICIAN ASSISTANT | Admitting: PHYSICIAN ASSISTANT
Payer: COMMERCIAL

## 2019-02-25 DIAGNOSIS — Z12.31 VISIT FOR SCREENING MAMMOGRAM: ICD-10-CM

## 2019-02-25 PROCEDURE — 77063 BREAST TOMOSYNTHESIS BI: CPT

## 2019-04-03 ENCOUNTER — TRANSFERRED RECORDS (OUTPATIENT)
Dept: HEALTH INFORMATION MANAGEMENT | Facility: CLINIC | Age: 64
End: 2019-04-03

## 2019-04-03 NOTE — PROGRESS NOTES
SUBJECTIVE:   Janet Ram is a 64 year old female who presents to clinic today for the following health issues:      History of Present Illness     Diabetes:     Frequency of checking blood sugars::  1 time a day (Blood sugars improved,  range from 90s-120s)    Diabetic concerns::  None    Hypoglycemia symptoms::  None    Paraesthesia present::  No    Eye Exam in the last year::  Yes    April 3 2019    Diabetes Management Resources    Hyperlipidemia:     Low fat/chol diet rating::  Good    Taking Statins::  YES    Side effects from hypolipidemia medication::  No muscle aches from Statin    Lipid Medications or Supplements::  None    Hypertension:     Outpatient blood pressures:  Are not being checked    Dietary sodium intake::  No added salt diet    Diet:  Low salt  Frequency of exercise:  None  Taking medications regularly:  Yes  Medication side effects:  Not applicable  Additional concerns today:  No      Problem list and histories reviewed & adjusted, as indicated.  Additional history: as documented        Recent Labs   Lab Test 04/04/19  0810 01/09/19  0926 09/24/18  0750 08/01/18 04/19/18  0746  08/29/17  0851   A1C  --  8.1* 8.2* 9.1   < >  --    < > 10.4*   LDL 52  --   --   --   --  30  --  66   HDL 42*  --   --   --   --  41*  --  46*   TRIG 188*  --   --   --   --  287*  --  201*   ALT 24  --   --   --   --  36  --  28   CR 0.72  --   --   --   --  0.74  --  0.82   GFRESTIMATED 88  --   --   --   --  79  --  70   GFRESTBLACK >90  --   --   --   --  >90  --  85   POTASSIUM 4.5  --   --   --   --  4.6  --  4.4   TSH 2.32  --  2.87  --    < >  --   --   --     < > = values in this interval not displayed.      BP Readings from Last 3 Encounters:   04/09/19 126/70   01/09/19 128/68   11/28/18 151/83    Wt Readings from Last 3 Encounters:   04/09/19 96.2 kg (212 lb)   01/09/19 96.2 kg (212 lb)   08/01/18 99.2 kg (218 lb 11.1 oz)                  Labs reviewed in EPIC    ROS:  CONSTITUTIONAL: NEGATIVE for  "fever, chills, change in weight  ENT/MOUTH: NEGATIVE for ear, mouth and throat problems  RESP: NEGATIVE for significant cough or SOB  CV: NEGATIVE for chest pain, palpitations or peripheral edema  GI: NEGATIVE for nausea, abdominal pain, heartburn, or change in bowel habits and she did get \"violently ill\" while in [place] she had vomiting and diarrhea that she recovered from that  NEURO: NEGATIVE for weakness, dizziness or paresthesias  PSYCHIATRIC: NEGATIVE for changes in mood or affect    OBJECTIVE:     /70   Pulse 74   Temp 97.1  F (36.2  C) (Temporal)   Resp 16   Wt 96.2 kg (212 lb)   LMP  (LMP Unknown)   BMI 35.83 kg/m    Body mass index is 35.83 kg/m .  GENERAL: healthy, alert and no distress  NECK: no adenopathy, no asymmetry, masses, or scars and thyroid normal to palpation  RESP: lungs clear to auscultation - no rales, rhonchi or wheezes  CV: regular rate and rhythm, normal S1 S2, no S3 or S4, no murmur, click or rub, no peripheral edema and peripheral pulses strong  ABDOMEN: soft, nontender, no hepatosplenomegaly, no masses and bowel sounds normal  MS: no gross musculoskeletal defects noted, no edema  NEURO: Normal strength and tone, mentation intact and speech normal  PSYCH: mentation appears normal, affect normal/bright  Diabetic foot exam: normal DP and PT pulses, no trophic changes or ulcerative lesions and normal sensory exam    Diagnostic Test Results:  Results for orders placed or performed in visit on 04/09/19 (from the past 24 hour(s))   Hemoglobin A1c   Result Value Ref Range    Hemoglobin A1C 7.6 (H) 0 - 5.6 %     Results for orders placed or performed in visit on 04/09/19   Hemoglobin A1c   Result Value Ref Range    Hemoglobin A1C 7.6 (H) 0 - 5.6 %       ASSESSMENT/PLAN:           1. Uncontrolled diabetes mellitus type 2 without complications (H)  Improved control, continue current meds keep up the good work  - Hemoglobin A1c    2. Morbid obesity (H)  Diet, exercise continued " low-carb diet    3. Screening for HIV (human immunodeficiency virus)  Declined by patient      recheck in 6 months     Estephanie Mancia PA-C  [facility]

## 2019-04-04 DIAGNOSIS — E06.3 HASHIMOTO'S THYROIDITIS: ICD-10-CM

## 2019-04-04 DIAGNOSIS — I10 HYPERTENSION GOAL BP (BLOOD PRESSURE) < 140/90: ICD-10-CM

## 2019-04-04 DIAGNOSIS — E78.5 HYPERLIPIDEMIA LDL GOAL <100: ICD-10-CM

## 2019-04-04 LAB
ALBUMIN SERPL-MCNC: 3.5 G/DL (ref 3.4–5)
ALP SERPL-CCNC: 107 U/L (ref 40–150)
ALT SERPL W P-5'-P-CCNC: 24 U/L (ref 0–50)
ANION GAP SERPL CALCULATED.3IONS-SCNC: 9 MMOL/L (ref 3–14)
AST SERPL W P-5'-P-CCNC: 11 U/L (ref 0–45)
BILIRUB SERPL-MCNC: 1.2 MG/DL (ref 0.2–1.3)
BUN SERPL-MCNC: 15 MG/DL (ref 7–30)
CALCIUM SERPL-MCNC: 8.8 MG/DL (ref 8.5–10.1)
CHLORIDE SERPL-SCNC: 108 MMOL/L (ref 94–109)
CHOLEST SERPL-MCNC: 132 MG/DL
CO2 SERPL-SCNC: 24 MMOL/L (ref 20–32)
CREAT SERPL-MCNC: 0.72 MG/DL (ref 0.52–1.04)
CREAT UR-MCNC: 111 MG/DL
GFR SERPL CREATININE-BSD FRML MDRD: 88 ML/MIN/{1.73_M2}
GLUCOSE SERPL-MCNC: 170 MG/DL (ref 70–99)
HDLC SERPL-MCNC: 42 MG/DL
LDLC SERPL CALC-MCNC: 52 MG/DL
MICROALBUMIN UR-MCNC: 8 MG/L
MICROALBUMIN/CREAT UR: 7.21 MG/G CR (ref 0–25)
NONHDLC SERPL-MCNC: 90 MG/DL
POTASSIUM SERPL-SCNC: 4.5 MMOL/L (ref 3.4–5.3)
PROT SERPL-MCNC: 6.9 G/DL (ref 6.8–8.8)
SODIUM SERPL-SCNC: 141 MMOL/L (ref 133–144)
TRIGL SERPL-MCNC: 188 MG/DL
TSH SERPL DL<=0.005 MIU/L-ACNC: 2.32 MU/L (ref 0.4–4)

## 2019-04-04 PROCEDURE — 36415 COLL VENOUS BLD VENIPUNCTURE: CPT | Performed by: PHYSICIAN ASSISTANT

## 2019-04-04 PROCEDURE — 82043 UR ALBUMIN QUANTITATIVE: CPT | Performed by: PHYSICIAN ASSISTANT

## 2019-04-04 PROCEDURE — 84443 ASSAY THYROID STIM HORMONE: CPT | Performed by: PHYSICIAN ASSISTANT

## 2019-04-04 PROCEDURE — 80061 LIPID PANEL: CPT | Performed by: PHYSICIAN ASSISTANT

## 2019-04-04 PROCEDURE — 80053 COMPREHEN METABOLIC PANEL: CPT | Performed by: PHYSICIAN ASSISTANT

## 2019-04-08 RX ORDER — LANCETS
EACH MISCELLANEOUS
Status: CANCELLED | OUTPATIENT
Start: 2019-04-08

## 2019-04-09 ENCOUNTER — OFFICE VISIT (OUTPATIENT)
Dept: FAMILY MEDICINE | Facility: OTHER | Age: 64
End: 2019-04-09
Payer: COMMERCIAL

## 2019-04-09 VITALS
TEMPERATURE: 97.1 F | WEIGHT: 212 LBS | DIASTOLIC BLOOD PRESSURE: 70 MMHG | BODY MASS INDEX: 35.83 KG/M2 | RESPIRATION RATE: 16 BRPM | SYSTOLIC BLOOD PRESSURE: 126 MMHG | HEART RATE: 74 BPM

## 2019-04-09 DIAGNOSIS — E66.01 MORBID OBESITY (H): ICD-10-CM

## 2019-04-09 DIAGNOSIS — Z11.4 SCREENING FOR HIV (HUMAN IMMUNODEFICIENCY VIRUS): Primary | ICD-10-CM

## 2019-04-09 LAB — HBA1C MFR BLD: 7.6 % (ref 0–5.6)

## 2019-04-09 PROCEDURE — 99214 OFFICE O/P EST MOD 30 MIN: CPT | Performed by: PHYSICIAN ASSISTANT

## 2019-04-09 PROCEDURE — 83036 HEMOGLOBIN GLYCOSYLATED A1C: CPT | Performed by: PHYSICIAN ASSISTANT

## 2019-04-09 PROCEDURE — 36415 COLL VENOUS BLD VENIPUNCTURE: CPT | Performed by: PHYSICIAN ASSISTANT

## 2019-04-09 ASSESSMENT — PAIN SCALES - GENERAL: PAINLEVEL: NO PAIN (0)

## 2019-04-09 NOTE — ACP (ADVANCE CARE PLANNING)
Spoke with pt at office visit and she states she has all the papers at home and will work on completing this and getting a copy for scanning.    Chloé Enrique CMA ([facility])

## 2019-05-16 RX ORDER — DULAGLUTIDE 1.5 MG/.5ML
INJECTION, SOLUTION SUBCUTANEOUS
Refills: 3 | OUTPATIENT
Start: 2019-05-16

## 2019-05-16 NOTE — TELEPHONE ENCOUNTER
Appears patient is seeing primary for diabetes management.     Will forward to Estephanie Mancia directly as instructed on [facility] Protocol.    Soha Jose LPN  Diabetes Clinic Coordinator   Adult Endocrinology and Pediatric Specialty Clinics  [facility]

## 2019-08-30 NOTE — TELEPHONE ENCOUNTER
Metformin  Routing refill request to provider for review/approval because:  A break in medication    Next 5 appointments (look out 90 days)    Oct 15, 2019  8:30 AM CDT  Office Visit with Estephanie Mancia PA-C  [facility] ([facility]) [address] [facility] [address] [phone]        Josie Flowers RN, BSN

## 2019-09-19 ENCOUNTER — TELEPHONE (OUTPATIENT)
Dept: FAMILY MEDICINE | Facility: OTHER | Age: 64
End: 2019-09-19

## 2019-09-19 DIAGNOSIS — E11.9 TYPE 2 DIABETES, HBA1C GOAL < 7% (H): Primary | ICD-10-CM

## 2019-09-19 NOTE — TELEPHONE ENCOUNTER
Per review of patients chart it looks like they are only due for a hemoglobin A1C. Order placed and patient informed.     Sacha Monaco,

## 2019-09-19 NOTE — TELEPHONE ENCOUNTER
Reason for Call: Request for an order or referral:    Order or referral being requested: orders    Date needed: before my next appointment    Has the patient been seen by the PCP for this problem? YES    Additional comments: patient has an appointment with Estephanie Mancia on 10/16/2019 and she would like to have her lab work done a week before her appointment. Please call and let patient know when orders have been placed.    Phone number Patient can be reached at:  Home number on file [phone] (home) or Cell number on file:    Telephone Information:   Mobile [phone]       Best Time:  anytime    Can we leave a detailed message on this number?  YES    Call taken on 9/19/2019 at 10:20 AM by Brianna Carbajal

## 2019-10-11 DIAGNOSIS — E11.9 TYPE 2 DIABETES, HBA1C GOAL < 7% (H): ICD-10-CM

## 2019-10-11 LAB — HBA1C MFR BLD: 7.3 % (ref 0–5.6)

## 2019-10-11 PROCEDURE — 83036 HEMOGLOBIN GLYCOSYLATED A1C: CPT | Performed by: PHYSICIAN ASSISTANT

## 2019-10-11 PROCEDURE — 36415 COLL VENOUS BLD VENIPUNCTURE: CPT | Performed by: PHYSICIAN ASSISTANT

## 2019-10-11 NOTE — PROGRESS NOTES
Subjective     Janet Ram is a 64 year old female who presents to clinic today for the following health issues:    History of Present Illness        Diabetes:   She presents for follow up of diabetes.  She is checking home blood glucose one time daily (110s-150s, usually around 120s-130s). She checks blood glucose before meals.  Blood glucose is never over 200 and never under 70. She is aware of hypoglycemia symptoms including shakiness. She has no concerns regarding her diabetes at this time.  She is not experiencing numbness or burning in feet, excessive thirst, blurry vision, weight changes or redness, sores or blisters on feet. The patient has had a diabetic eye exam in the last 12 months. Eye exam performed on April 2019.    Diabetes Management Resources  Diabetes comment:  Eats more when she is stressed, trying to correct this.  She is excited she plans to retire in April 2020.    Hyperlipidemia:  She presents for follow up of hyperlipidemia.  She is taking medication to lower cholesterol. She is not having myalgia or other side effects to statin medications.She is not reporting shortness of breath, increased sweating or nausea with activity, left-sided neck or arm pain, chest pain or pressure, or pain in calves when walking 1-2 blocks.    Hypertension: She presents for follow up of hypertension.  She does not check blood pressure  regularly outside of the clinic. Outpatient blood pressures have not been over 140/90. She follows a low salt diet.     She eats 0-1 servings of fruits and vegetables daily.She consumes 1 sweetened beverage(s) daily.  She is taking medications regularly.       She is feeling well in regards to her thyroid, she thinks that her thyroid has helped her blood sugars.    Recent Labs   Lab Test 10/11/19  0828 04/09/19  0855 04/04/19  0810 01/09/19  0926 09/24/18  0750  04/19/18  0746  08/29/17  0851   A1C 7.3* 7.6*  --  8.1* 8.2*   < >  --    < > 10.4*   LDL  --   --  52  --   --   --   30  --  66   HDL  --   --  42*  --   --   --  41*  --  46*   TRIG  --   --  188*  --   --   --  287*  --  201*   ALT  --   --  24  --   --   --  36  --  28   CR  --   --  0.72  --   --   --  0.74  --  0.82   GFRESTIMATED  --   --  88  --   --   --  79  --  70   GFRESTBLACK  --   --  >90  --   --   --  >90  --  85   POTASSIUM  --   --  4.5  --   --   --  4.6  --  4.4   TSH  --   --  2.32  --  2.87   < >  --   --   --     < > = values in this interval not displayed.      BP Readings from Last 3 Encounters:   10/16/19 118/68   04/09/19 126/70   01/09/19 128/68    Wt Readings from Last 3 Encounters:   10/16/19 98.4 kg (217 lb)   04/09/19 96.2 kg (212 lb)   01/09/19 96.2 kg (212 lb)                    Reviewed and updated as needed this visit by Provider  Tobacco  Allergies  Meds  Problems  Med Hx  Surg Hx  Fam Hx         Review of Systems   ROS COMP: CONSTITUTIONAL: NEGATIVE for fever, chills, change in weight  ENT/MOUTH: NEGATIVE for ear, mouth and throat problems  RESP: NEGATIVE for significant cough or SOB  CV: NEGATIVE for chest pain, palpitations or peripheral edema  GI: NEGATIVE for nausea, abdominal pain, heartburn, or change in bowel habits  ENDOCRINE: NEGATIVE for temperature intolerance, skin/hair changes  PSYCHIATRIC: NEGATIVE for changes in mood or affect      Objective    /68   Pulse 72   Temp 97.9  F (36.6  C) (Temporal)   Resp 14   Wt 98.4 kg (217 lb)   LMP  (LMP Unknown)   BMI 36.67 kg/m    Body mass index is 36.67 kg/m .  Physical Exam   GENERAL: healthy, alert and no distress  NECK: no adenopathy, no asymmetry, masses, or scars and thyroid normal to palpation  RESP: lungs clear to auscultation - no rales, rhonchi or wheezes  CV: regular rate and rhythm, normal S1 S2, no S3 or S4, no murmur, click or rub, no peripheral edema and peripheral pulses strong  ABDOMEN: soft, nontender, no hepatosplenomegaly, no masses and bowel sounds normal  MS: no gross musculoskeletal defects noted, no  edema  PSYCH: mentation appears normal, affect normal/bright  Diabetic foot exam: normal DP and PT pulses, no trophic changes or ulcerative lesions, normal sensory exam and normal monofilament exam    Diagnostic Test Results:  Labs reviewed in Epic  No results found for this or any previous visit (from the past 24 hour(s)).        Assessment & Plan     1. Uncontrolled type 2 diabetes mellitus without complication, with long-term current use of insulin (H)  Improving , encouraged weight loss , no med changes at this point  - blood glucose (NO BRAND SPECIFIED) test strip; Use to test blood sugar 1 times daily or as directed. Please give patient meter, test trips and lancets as covered by insurance.  Dispense: 100 each; Refill: 3  - atorvastatin (LIPITOR) 40 MG tablet; Take 1 tablet (40 mg) by mouth daily  Dispense: 90 tablet; Refill: 3  - dulaglutide (TRULICITY) 1.5 MG/0.5ML pen; Inject 1.5 mg Subcutaneous every 7 days  Dispense: 6 mL; Refill: 5  - glipiZIDE (GLUCOTROL XL) 10 MG 24 hr tablet; Take 1 tablet (10 mg) by mouth 2 times daily  Dispense: 180 tablet; Refill: 3    2. Hashimoto's thyroiditis  Labs utd  - levothyroxine (SYNTHROID/LEVOTHROID) 50 MCG tablet; Take 1 tablet (50 mcg) by mouth daily  Dispense: 90 tablet; Refill: 1    3. Hypertension goal BP (blood pressure) < 140/90  At goal, continue current meds  - atorvastatin (LIPITOR) 40 MG tablet; Take 1 tablet (40 mg) by mouth daily  Dispense: 90 tablet; Refill: 3    4. Hyperlipidemia LDL goal <100  At goal  - atorvastatin (LIPITOR) 40 MG tablet; Take 1 tablet (40 mg) by mouth daily  Dispense: 90 tablet; Refill: 3    5. Benign essential hypertension  Doing well, continue current med  - lisinopril (PRINIVIL/ZESTRIL) 20 MG tablet; Take 1 tablet (20 mg) by mouth daily  Dispense: 90 tablet; Refill: 3       This chart documentation was completed in part with Dragon voice recognition software.  Documentation is reviewed after completion, however, some words and  grammatical errors may remain.  Estephanie Mancia PA-C      Return in about 6 months (around 4/16/2020) for diabetes, thyroid.    Estephanie Mancia PA-C  [facility]

## 2019-10-16 ENCOUNTER — OFFICE VISIT (OUTPATIENT)
Dept: FAMILY MEDICINE | Facility: OTHER | Age: 64
End: 2019-10-16
Payer: COMMERCIAL

## 2019-10-16 VITALS
BODY MASS INDEX: 36.67 KG/M2 | WEIGHT: 217 LBS | TEMPERATURE: 97.9 F | SYSTOLIC BLOOD PRESSURE: 118 MMHG | HEART RATE: 72 BPM | DIASTOLIC BLOOD PRESSURE: 68 MMHG | RESPIRATION RATE: 14 BRPM

## 2019-10-16 DIAGNOSIS — E78.5 HYPERLIPIDEMIA LDL GOAL <100: ICD-10-CM

## 2019-10-16 DIAGNOSIS — I10 BENIGN ESSENTIAL HYPERTENSION: ICD-10-CM

## 2019-10-16 DIAGNOSIS — I10 HYPERTENSION GOAL BP (BLOOD PRESSURE) < 140/90: ICD-10-CM

## 2019-10-16 DIAGNOSIS — E06.3 HASHIMOTO'S THYROIDITIS: ICD-10-CM

## 2019-10-16 PROCEDURE — 99214 OFFICE O/P EST MOD 30 MIN: CPT | Performed by: PHYSICIAN ASSISTANT

## 2019-10-16 PROCEDURE — 99207 C FOOT EXAM  NO CHARGE: CPT | Performed by: PHYSICIAN ASSISTANT

## 2019-10-16 RX ORDER — LISINOPRIL 20 MG/1
20 TABLET ORAL DAILY
Qty: 90 TABLET | Refills: 3 | Status: SHIPPED | OUTPATIENT
Start: 2019-10-16 | End: 2020-11-30

## 2019-10-16 RX ORDER — ATORVASTATIN CALCIUM 40 MG/1
40 TABLET, FILM COATED ORAL DAILY
Qty: 90 TABLET | Refills: 3 | Status: SHIPPED | OUTPATIENT
Start: 2019-10-16 | End: 2020-11-04

## 2019-10-16 RX ORDER — LEVOTHYROXINE SODIUM 50 UG/1
50 TABLET ORAL DAILY
Qty: 90 TABLET | Refills: 1 | Status: SHIPPED | OUTPATIENT
Start: 2019-10-16 | End: 2020-03-24

## 2019-10-16 RX ORDER — GLIPIZIDE 10 MG/1
10 TABLET, FILM COATED, EXTENDED RELEASE ORAL 2 TIMES DAILY
Qty: 180 TABLET | Refills: 3 | Status: SHIPPED | OUTPATIENT
Start: 2019-10-16 | End: 2020-11-04

## 2019-10-16 ASSESSMENT — PAIN SCALES - GENERAL: PAINLEVEL: NO PAIN (0)

## 2019-11-08 ENCOUNTER — TELEPHONE (OUTPATIENT)
Dept: FAMILY MEDICINE | Facility: OTHER | Age: 64
End: 2019-11-08

## 2019-11-08 NOTE — TELEPHONE ENCOUNTER
LM for pt to return call, when call is returned please let us know what date she got her flu shot on so we can add this to her chart.      Chloé Enrique CMA ([facility])

## 2019-11-08 NOTE — TELEPHONE ENCOUNTER
Reason for Call:  Other Patient would just like you to know she has gotten her flu shot    Detailed comments: none    Phone Number Patient can be reached at: Home number on file [phone] (home)    Best Time: anytime    Can we leave a detailed message on this number? YES    Call taken on 11/8/2019 at 3:26 PM by Rehan Caldwell

## 2019-11-11 NOTE — TELEPHONE ENCOUNTER
Left message for patient to return call to clinic. When call is returned please inquire about on what day did she get her flu shot so we can add this to her chart.       Sacha Monaco,

## 2020-01-21 ENCOUNTER — TELEPHONE (OUTPATIENT)
Dept: FAMILY MEDICINE | Facility: OTHER | Age: 65
End: 2020-01-21

## 2020-01-21 NOTE — TELEPHONE ENCOUNTER
Prior Authorization Retail Medication Request    Medication/Dose: dulaglutide (TRULICITY) 1.5 MG/0.5ML pen  ICD code (if different than what is on RX):  Same as RX  Previously Tried and Failed:    insulin detemir (LEVEMIR FLEXPEN/FLEXTOUCH) 100 UNIT/ML  insulin degludec (TRESIBA) 100 UNIT/ML pen  Injection  LANTUS SOLOSTAR 100 UNIT/ML soln  Rationale:      Insurance Name:  [employer]  Insurance ID:  [id number]       Pharmacy Information (if different than what is on RX)  Name:  Same as Rx  Phone:      covermymeds  KEY: [id number]

## 2020-01-23 NOTE — TELEPHONE ENCOUNTER
Central Prior Authorization Team   Phone: [phone]      PA Initiation    Medication: dulaglutide (TRULICITY) 1.5 MG/0.5ML pen - INITIATED  Insurance Company: [employer] [place] - Phone [phone] Fax [phone]  Pharmacy Filling the Rx: [facility] [address] [place], [place] - [address] [facility]  Filling Pharmacy Phone: [phone]  Filling Pharmacy Fax: [phone]  Start Date: 1/23/2020

## 2020-01-23 NOTE — TELEPHONE ENCOUNTER
Central Prior Authorization Team   Phone: [phone]      Prior Authorization Approval    Authorization Effective Date: 1/20/2020  Authorization Expiration Date: 1/20/2021  Medication: dulaglutide (TRULICITY) 1.5 MG/0.5ML pen - APPROVED  Approved Dose/Quantity: 6 FOR 84  Reference #:     Insurance Company: [employer] [place] - Phone [phone] Fax [phone]  Expected CoPay: $75.00      CoPay Card Available:      Foundation Assistance Needed:    Which Pharmacy is filling the prescription (Not needed for infusion/clinic administered): [facility] [address] [place] [id number] [facility]  Pharmacy Notified: Yes  Patient Notified: Yes (**Instructed pharmacy to notify patient when script is ready to /ship.**)

## 2020-02-11 DIAGNOSIS — E11.9 TYPE 2 DIABETES, HBA1C GOAL < 7% (H): ICD-10-CM

## 2020-02-12 NOTE — TELEPHONE ENCOUNTER
LM asking pt to return call. Received Refill request for test strips, meter and lancets. Does she really need all of these?     Shira Salinas, MSN, RN

## 2020-02-14 RX ORDER — LANCETS
EACH MISCELLANEOUS
Qty: 100 EACH | Refills: 3 | Status: SHIPPED | OUTPATIENT
Start: 2020-02-14

## 2020-02-14 NOTE — TELEPHONE ENCOUNTER
Prescription approved per [facility] Refill Protocol.  Hang Barron, RN, BSN

## 2020-02-14 NOTE — TELEPHONE ENCOUNTER
Patient returned call and just needs a refill on the lancets and test strips.     Sacha Monaco,

## 2020-02-28 ENCOUNTER — HOSPITAL ENCOUNTER (OUTPATIENT)
Dept: MAMMOGRAPHY | Facility: CLINIC | Age: 65
Discharge: HOME | End: 2020-02-28
Attending: PHYSICIAN ASSISTANT | Admitting: PHYSICIAN ASSISTANT
Payer: COMMERCIAL

## 2020-02-28 DIAGNOSIS — Z12.31 VISIT FOR SCREENING MAMMOGRAM: ICD-10-CM

## 2020-02-28 PROCEDURE — 77063 BREAST TOMOSYNTHESIS BI: CPT

## 2020-03-10 NOTE — TELEPHONE ENCOUNTER
Pending Prescriptions:                       Disp   Refills    metFORMIN (GLUCOPHAGE) 1000 MG tablet [Ph*180 ta*0            Sig: TAKE 1 TABLET BY MOUTH TWICE DAILY WITH MEALS    Medication is being filled for 90 day supply only due to:  Patient needs to be seen because needs diabetes follow up and labs 4/2020.     Ileana Fallon, BSN, RN, PHN

## 2020-03-13 ENCOUNTER — TELEPHONE (OUTPATIENT)
Dept: FAMILY MEDICINE | Facility: OTHER | Age: 65
End: 2020-03-13

## 2020-03-13 DIAGNOSIS — I10 BENIGN ESSENTIAL HYPERTENSION: ICD-10-CM

## 2020-03-13 DIAGNOSIS — E78.5 HYPERLIPIDEMIA LDL GOAL <100: ICD-10-CM

## 2020-03-13 DIAGNOSIS — E06.3 HASHIMOTO'S THYROIDITIS: ICD-10-CM

## 2020-03-13 NOTE — TELEPHONE ENCOUNTER
Patient is due for a few different labs come beginning of April. Pended those labs. Provider please review and add or change as needed.     Sacha Monaco,

## 2020-03-13 NOTE — TELEPHONE ENCOUNTER
This patient has an appointment for lab work but does not have any orders. Please place some future orders as needed.    Thank You,  Hannah Garcia MLT (ASCP)

## 2020-03-18 ENCOUNTER — TELEPHONE (OUTPATIENT)
Dept: FAMILY MEDICINE | Facility: OTHER | Age: 65
End: 2020-03-18

## 2020-03-18 DIAGNOSIS — E78.5 HYPERLIPIDEMIA LDL GOAL <100: ICD-10-CM

## 2020-03-18 DIAGNOSIS — I10 BENIGN ESSENTIAL HYPERTENSION: ICD-10-CM

## 2020-03-18 DIAGNOSIS — E06.3 HASHIMOTO'S THYROIDITIS: ICD-10-CM

## 2020-03-18 LAB
ALBUMIN SERPL-MCNC: 3.6 G/DL (ref 3.4–5)
ALP SERPL-CCNC: 94 U/L (ref 40–150)
ALT SERPL W P-5'-P-CCNC: 25 U/L (ref 0–50)
ANION GAP SERPL CALCULATED.3IONS-SCNC: 4 MMOL/L (ref 3–14)
AST SERPL W P-5'-P-CCNC: 14 U/L (ref 0–45)
BILIRUB SERPL-MCNC: 1.5 MG/DL (ref 0.2–1.3)
BUN SERPL-MCNC: 17 MG/DL (ref 7–30)
CALCIUM SERPL-MCNC: 9 MG/DL (ref 8.5–10.1)
CHLORIDE SERPL-SCNC: 109 MMOL/L (ref 94–109)
CHOLEST SERPL-MCNC: 140 MG/DL
CO2 SERPL-SCNC: 28 MMOL/L (ref 20–32)
CREAT SERPL-MCNC: 0.69 MG/DL (ref 0.52–1.04)
CREAT UR-MCNC: 135 MG/DL
GFR SERPL CREATININE-BSD FRML MDRD: >90 ML/MIN/{1.73_M2}
GLUCOSE SERPL-MCNC: 162 MG/DL (ref 70–99)
HBA1C MFR BLD: 8.1 % (ref 0–5.6)
HDLC SERPL-MCNC: 45 MG/DL
LDLC SERPL CALC-MCNC: 54 MG/DL
MICROALBUMIN UR-MCNC: 12 MG/L
MICROALBUMIN/CREAT UR: 8.89 MG/G CR (ref 0–25)
NONHDLC SERPL-MCNC: 95 MG/DL
POTASSIUM SERPL-SCNC: 4.3 MMOL/L (ref 3.4–5.3)
PROT SERPL-MCNC: 7.1 G/DL (ref 6.8–8.8)
SODIUM SERPL-SCNC: 141 MMOL/L (ref 133–144)
TRIGL SERPL-MCNC: 204 MG/DL
TSH SERPL DL<=0.005 MIU/L-ACNC: 2.69 MU/L (ref 0.4–4)

## 2020-03-18 PROCEDURE — 36415 COLL VENOUS BLD VENIPUNCTURE: CPT | Performed by: PHYSICIAN ASSISTANT

## 2020-03-18 PROCEDURE — 83036 HEMOGLOBIN GLYCOSYLATED A1C: CPT | Performed by: PHYSICIAN ASSISTANT

## 2020-03-18 PROCEDURE — 80053 COMPREHEN METABOLIC PANEL: CPT | Performed by: PHYSICIAN ASSISTANT

## 2020-03-18 PROCEDURE — 80061 LIPID PANEL: CPT | Performed by: PHYSICIAN ASSISTANT

## 2020-03-18 PROCEDURE — 82043 UR ALBUMIN QUANTITATIVE: CPT | Performed by: PHYSICIAN ASSISTANT

## 2020-03-18 PROCEDURE — 84443 ASSAY THYROID STIM HORMONE: CPT | Performed by: PHYSICIAN ASSISTANT

## 2020-03-18 NOTE — TELEPHONE ENCOUNTER
Attempted to reach pt about rescheduling or doing alternative visits due to COVID 19. Line was busy. Will try again later.    Chloé Enrique CMA ([facility])

## 2020-03-19 NOTE — PROGRESS NOTES
"Janet Ram is a 65 year old female who is being evaluated via a billable telephone visit.      The patient has been notified of following:     \"This telephone visit will be conducted via a call between you and your physician/provider. We have found that certain health care needs can be provided without the need for a physical exam.  This service lets us provide the care you need with a short phone conversation.  If a prescription is necessary we can send it directly to your pharmacy.  If lab work is needed we can place an order for that and you can then stop by our lab to have the test done at a later time.    If during the course of the call the physician/provider feels a telephone visit is not appropriate, you will not be charged for this service.\"     Janet Ram complains of    Chief Complaint   Patient presents with     Diabetes       I have reviewed and updated the patient's Past Medical History, Social History, Family History and Medication List.    ALLERGIES  Penicillins      Additional provider notes:   She is doing well.  Blood sugars range in the 120s to 140s mostly.  Some as high as 162 some as low as 95.  It definitely depends on what she eats.  She is retiring after tomorrow.  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  She is not having any issues or side effects from her medications.  She did have lab work completed last week.  This was reviewed with patient today.  She is not having any fevers, chills, shortness of breath, chest pain, or coughing.  No other concerns today    Assessment/Plan:  1. Uncontrolled type 2 diabetes mellitus without complication, with long-term current use of insulin (H)  Patient will exercise, lose weight, improve her diet, we will recheck A1c again in 3 months hopefully we can see her in clinic at that time we are doing a phone visit today due to COVID 19  - metFORMIN (GLUCOPHAGE) 1000 MG tablet; TAKE 1 TABLET BY MOUTH TWICE DAILY " WITH MEALS  Dispense: 180 tablet; Refill: 1  - dulaglutide (TRULICITY) 1.5 MG/0.5ML pen; Inject 1.5 mg Subcutaneous every 7 days  Dispense: 6 mL; Refill: 5  - NONPHYSICIAN TELEPHONE ASSESSMENT 5-10 MIN    2. Hashimoto's thyroiditis  TSh stable  - levothyroxine (SYNTHROID/LEVOTHROID) 50 MCG tablet; Take 1 tablet (50 mcg) by mouth daily  Dispense: 90 tablet; Refill: 3  - NONPHYSICIAN TELEPHONE ASSESSMENT 5-10 MIN    3. Hyperlipidemia LDL goal <100  At goal, continue lipitor  - NONPHYSICIAN TELEPHONE ASSESSMENT 5-10 MIN    Phone call duration:  10 minutes    Estephanie Mancia PA-C

## 2020-03-24 ENCOUNTER — VIRTUAL VISIT (OUTPATIENT)
Dept: FAMILY MEDICINE | Facility: OTHER | Age: 65
End: 2020-03-24
Payer: COMMERCIAL

## 2020-03-24 DIAGNOSIS — E78.5 HYPERLIPIDEMIA LDL GOAL <100: Primary | ICD-10-CM

## 2020-03-24 DIAGNOSIS — E06.3 HASHIMOTO'S THYROIDITIS: ICD-10-CM

## 2020-03-24 PROCEDURE — 99441 C NONPHYSICIAN TELEPHONE ASSESSMENT 5-10 MIN: CPT | Performed by: PHYSICIAN ASSISTANT

## 2020-03-24 RX ORDER — LEVOTHYROXINE SODIUM 50 UG/1
50 TABLET ORAL DAILY
Qty: 90 TABLET | Refills: 3 | Status: SHIPPED | OUTPATIENT
Start: 2020-03-24 | End: 2021-05-06

## 2020-08-03 ENCOUNTER — TELEPHONE (OUTPATIENT)
Dept: FAMILY MEDICINE | Facility: OTHER | Age: 65
End: 2020-08-03

## 2020-08-03 NOTE — TELEPHONE ENCOUNTER
Left message for patient, please verify which pharmacy patient is using.    We received a fax from [facility] requesting refills for Metformin, please verify if patient requested   Thanks  Malou HARDING (R)

## 2020-11-04 ENCOUNTER — TELEPHONE (OUTPATIENT)
Dept: FAMILY MEDICINE | Facility: OTHER | Age: 65
End: 2020-11-04

## 2020-11-04 DIAGNOSIS — I10 HYPERTENSION GOAL BP (BLOOD PRESSURE) < 140/90: ICD-10-CM

## 2020-11-04 DIAGNOSIS — E78.5 HYPERLIPIDEMIA LDL GOAL <100: ICD-10-CM

## 2020-11-04 DIAGNOSIS — E11.65 TYPE 2 DIABETES MELLITUS WITH HYPERGLYCEMIA, UNSPECIFIED WHETHER LONG TERM INSULIN USE (H): ICD-10-CM

## 2020-11-04 RX ORDER — GLIPIZIDE 10 MG/1
TABLET, FILM COATED, EXTENDED RELEASE ORAL
Qty: 180 TABLET | Refills: 0 | Status: SHIPPED | OUTPATIENT
Start: 2020-11-04 | End: 2020-11-30

## 2020-11-04 RX ORDER — ATORVASTATIN CALCIUM 40 MG/1
TABLET, FILM COATED ORAL
Qty: 90 TABLET | Refills: 0 | Status: SHIPPED | OUTPATIENT
Start: 2020-11-04 | End: 2020-11-30

## 2020-11-05 NOTE — TELEPHONE ENCOUNTER
Pending Prescriptions:                       Disp   Refills    atorvastatin (LIPITOR) 40 MG tablet [Phar*90 tab*0            Sig: Take 1 tablet by mouth once daily    glipiZIDE (GLUCOTROL XL) 10 MG 24 hr tabl*180 ta*0            Sig: Take 1 tablet by mouth twice daily    Medication is being filled for 1 time ej refill only due to:  Patient is due for med check diabetes and labs    Please call and help schedule.  Thank you!

## 2020-11-17 NOTE — TELEPHONE ENCOUNTER
Pt had called back and was given the message below. She canceled her lab she will have all of it done in march.

## 2020-11-17 NOTE — TELEPHONE ENCOUNTER
Left message for patient to return call. When call is returned, please assist with scheduling wellness visit.   Joanne Young MA

## 2020-11-17 NOTE — TELEPHONE ENCOUNTER
Orders placed for hemoglobin A1c, this is all she is due for at this time however if she wants to do her lipids and her kidney function, liver function, blood sugar and electrolytes which is due again for sure in  March we can do that at this time as well if she does not wish to do these last 2 test please cancel them  Estephanie Mancia PA-C

## 2020-11-17 NOTE — TELEPHONE ENCOUNTER
Pt is coming in for her wellness on 11/30/20 but she is coming in to have her labs down prior on 11/25/20. Please place orders in epic.

## 2020-11-24 NOTE — PROGRESS NOTES
"   SUBJECTIVE:   CC: Janet Ram is an 65 year old woman who presents for preventive health visit.     {Split Bill scripting  The purpose of this visit is to discuss your medical history and prevent health problems before you are sick. You may be responsible for a co-pay, coinsurance, or deductible if your visit today includes services such as checking on a sore throat, having an x-ray or lab test, or treating and evaluating a new or existing condition :353088}  Patient has been advised of split billing requirements and indicates understanding: {Yes and No:912566}  Healthy Habits:    Do you get at least three servings of calcium containing foods daily (dairy, green leafy vegetables, etc.)? { :442042::\"yes\"}    Amount of exercise or daily activities, outside of work: { :227152}    Problems taking medications regularly { :573889::\"No\"}    Medication side effects: { :940271::\"No\"}    Have you had an eye exam in the past two years? { :763464}    Do you see a dentist twice per year? { :899539}    Do you have sleep apnea, excessive snoring or daytime drowsiness?{ :861676}  {Outside tests to abstract? :190469}    {additional problems to add (Optional):499368}    Today's PHQ-2 Score:   PHQ-2 ( 1999 Pfizer) 3/24/2020 10/16/2019   Q1: Little interest or pleasure in doing things 0 0   Q2: Feeling down, depressed or hopeless 0 0   PHQ-2 Score 0 0   Q1: Little interest or pleasure in doing things - Not at all   Q2: Feeling down, depressed or hopeless - Not at all   PHQ-2 Score - 0     {PHQ-2 LOOK IN ASSESSMENTS (Optional) :454906}  Abuse: Current or Past(Physical, Sexual or Emotional)- {YES/NO/NA:067968}  Do you feel safe in your environment? {YES/NO/NA:470628}    Have you ever done Advance Care Planning? (For example, a Health Directive, POLST, or a discussion with a medical provider or your loved ones about your wishes): { :531604}    Social History     Tobacco Use     Smoking status: Never Smoker     Smokeless tobacco: " "Never Used   Substance Use Topics     Alcohol use: Yes     Alcohol/week: 0.0 standard drinks     Comment: rare     If you drink alcohol do you typically have >3 drinks per day or >7 drinks per week? {ETOH :784123}                     Reviewed orders with patient.  Reviewed health maintenance and updated orders accordingly - {Yes/No:087346::\"Yes\"}  {Chronicprobdata (Optional):332875}    {Mammo Decision Support (Optional):531636}    Pertinent mammograms are reviewed under the imaging tab.  History of abnormal Pap smear: {PAP HX:820289}  PAP / HPV Latest Ref Rng & Units 9/10/2015 4/2/2012   PAP - NIL NIL   HPV 16 DNA NEG Negative -   HPV 18 DNA NEG Negative -   OTHER HR HPV NEG Negative -     Reviewed and updated as needed this visit by clinical staff                 Reviewed and updated as needed this visit by Provider                {HISTORY OPTIONS (Optional):517148}    ROS:  { :772971}    OBJECTIVE:   LMP  (LMP Unknown)   EXAM:  {Exam Choices:126118}    {Diagnostic Test Results (Optional):264172::\"Diagnostic Test Results:\",\"Labs reviewed in Epic\"}    ASSESSMENT/PLAN:   {Diag Picklist:411407}    Patient has been advised of split billing requirements and indicates understanding: {YES / NO:781271::\"Yes\"}  COUNSELING:   {FEMALE COUNSELING MESSAGES:768864::\"Reviewed preventive health counseling, as reflected in patient instructions\"}    Estimated body mass index is 36.67 kg/m  as calculated from the following:    Height as of 1/9/19: 1.638 m (5' 4.5\").    Weight as of 10/16/19: 98.4 kg (217 lb).    {Weight Management Plan (ACO) Complete if BMI is abnormal-  Ages 18-64  BMI >24.9.  Age 65+ with BMI <23 or >30 (Optional):878868}    She reports that she has never smoked. She has never used smokeless tobacco.      Counseling Resources:  ATP IV Guidelines  Pooled Cohorts Equation Calculator  Breast Cancer Risk Calculator  BRCA-Related Cancer Risk Assessment: FHS-7 Tool  FRAX Risk Assessment  ICSI Preventive " Guidelines  Dietary Guidelines for Americans, 2010  USDA's MyPlate  ASA Prophylaxis  Lung CA Screening    Estephanie Mancia PA-C  [facility]

## 2020-11-24 NOTE — PATIENT INSTRUCTIONS
Preventive Health Recommendations    See your health care provider every year to    Review health changes.     Discuss preventive care.      Review your medicines if your doctor has prescribed any.      You no longer need a yearly Pap test unless you've had an abnormal Pap test in the past 10 years. If you have vaginal symptoms, such as bleeding or discharge, be sure to talk with your provider about a Pap test.      Every 1 to 2 years, have a mammogram.  If you are over 69, talk with your health care provider about whether or not you want to continue having screening mammograms.      Every 10 years, have a colonoscopy. Or, have a yearly FIT test (stool test). These exams will check for colon cancer.       Have a cholesterol test every 5 years, or more often if your doctor advises it.       Have a diabetes test (fasting glucose) every three years. If you are at risk for diabetes, you should have this test more often.       At age 65, have a bone density scan (DEXA) to check for osteoporosis (brittle bone disease).    Shots:    Get a flu shot each year.    Get a tetanus shot every 10 years.    Talk to your doctor about your pneumonia vaccines. There are now two you should receive - Pneumovax (PPSV 23) and Prevnar (PCV 13).    Talk to your pharmacist about the shingles vaccine.    Talk to your doctor about the hepatitis B vaccine.    Nutrition:     Eat at least 5 servings of fruits and vegetables each day.      Eat whole-grain bread, whole-wheat pasta and brown rice instead of white grains and rice.      Get adequate about Calcium and Vitamin D.     Lifestyle    Exercise at least 150 minutes a week (30 minutes a day, 5 days a week). This will help you control your weight and prevent disease.      Limit alcohol to one drink per day.      No smoking.       Wear sunscreen to prevent skin cancer.       See your dentist twice a year for an exam and cleaning.      See your eye doctor every 1 to 2 years to screen for  conditions such as glaucoma, macular degeneration, cataracts, etc.    Personalized Prevention Plan  You are due for the preventive services outlined below.  Your care team is available to assist you in scheduling these services.  If you have already completed any of these items, please share that information with your care team to update in your medical record.    Health Maintenance Due   Topic Date Due     Osteoporosis Screening  1955     HIV Screening  03/06/1970     Zoster (Shingles) Vaccine (1 of 2) 03/06/2005     Discuss Advance Care Planning  06/22/2016     Annual Wellness Visit  03/06/2020     Pneumococcal Vaccine (1 of 1 - PPSV23) 03/06/2020     Eye Exam  04/03/2020     Flu Vaccine (1) 09/01/2020     A1C Lab  09/18/2020     Diabetic Foot Exam  10/16/2020

## 2020-11-30 ENCOUNTER — OFFICE VISIT (OUTPATIENT)
Dept: FAMILY MEDICINE | Facility: OTHER | Age: 65
End: 2020-11-30
Payer: COMMERCIAL

## 2020-11-30 VITALS
RESPIRATION RATE: 20 BRPM | TEMPERATURE: 97.2 F | SYSTOLIC BLOOD PRESSURE: 132 MMHG | DIASTOLIC BLOOD PRESSURE: 74 MMHG | HEIGHT: 65 IN | OXYGEN SATURATION: 99 % | BODY MASS INDEX: 36.62 KG/M2 | HEART RATE: 80 BPM | WEIGHT: 219.8 LBS

## 2020-11-30 DIAGNOSIS — I10 BENIGN ESSENTIAL HYPERTENSION: ICD-10-CM

## 2020-11-30 DIAGNOSIS — E11.65 TYPE 2 DIABETES MELLITUS WITH HYPERGLYCEMIA, UNSPECIFIED WHETHER LONG TERM INSULIN USE (H): ICD-10-CM

## 2020-11-30 DIAGNOSIS — Z11.4 SCREENING FOR HIV (HUMAN IMMUNODEFICIENCY VIRUS): ICD-10-CM

## 2020-11-30 DIAGNOSIS — Z23 NEED FOR VACCINATION: Primary | ICD-10-CM

## 2020-11-30 DIAGNOSIS — Z12.31 ENCOUNTER FOR SCREENING MAMMOGRAM FOR BREAST CANCER: ICD-10-CM

## 2020-11-30 DIAGNOSIS — I10 HYPERTENSION GOAL BP (BLOOD PRESSURE) < 140/90: ICD-10-CM

## 2020-11-30 DIAGNOSIS — E78.5 HYPERLIPIDEMIA LDL GOAL <100: ICD-10-CM

## 2020-11-30 DIAGNOSIS — Z00.00 ROUTINE GENERAL MEDICAL EXAMINATION AT A HEALTH CARE FACILITY: ICD-10-CM

## 2020-11-30 LAB
ALBUMIN SERPL-MCNC: 3.5 G/DL (ref 3.4–5)
ALP SERPL-CCNC: 113 U/L (ref 40–150)
ALT SERPL W P-5'-P-CCNC: 30 U/L (ref 0–50)
ANION GAP SERPL CALCULATED.3IONS-SCNC: 10 MMOL/L (ref 3–14)
AST SERPL W P-5'-P-CCNC: 16 U/L (ref 0–45)
BILIRUB SERPL-MCNC: 1.6 MG/DL (ref 0.2–1.3)
BUN SERPL-MCNC: 13 MG/DL (ref 7–30)
CALCIUM SERPL-MCNC: 9.1 MG/DL (ref 8.5–10.1)
CHLORIDE SERPL-SCNC: 110 MMOL/L (ref 94–109)
CHOLEST SERPL-MCNC: 125 MG/DL
CO2 SERPL-SCNC: 23 MMOL/L (ref 20–32)
CREAT SERPL-MCNC: 0.79 MG/DL (ref 0.52–1.04)
GFR SERPL CREATININE-BSD FRML MDRD: 78 ML/MIN/{1.73_M2}
GLUCOSE SERPL-MCNC: 205 MG/DL (ref 70–99)
HBA1C MFR BLD: 9.4 % (ref 0–5.6)
HDLC SERPL-MCNC: 45 MG/DL
LDLC SERPL CALC-MCNC: 44 MG/DL
NONHDLC SERPL-MCNC: 80 MG/DL
POTASSIUM SERPL-SCNC: 4.1 MMOL/L (ref 3.4–5.3)
PROT SERPL-MCNC: 7.1 G/DL (ref 6.8–8.8)
SODIUM SERPL-SCNC: 143 MMOL/L (ref 133–144)
TRIGL SERPL-MCNC: 180 MG/DL

## 2020-11-30 PROCEDURE — G0009 ADMIN PNEUMOCOCCAL VACCINE: HCPCS | Performed by: PHYSICIAN ASSISTANT

## 2020-11-30 PROCEDURE — 99213 OFFICE O/P EST LOW 20 MIN: CPT | Mod: 25 | Performed by: PHYSICIAN ASSISTANT

## 2020-11-30 PROCEDURE — 36415 COLL VENOUS BLD VENIPUNCTURE: CPT | Performed by: PHYSICIAN ASSISTANT

## 2020-11-30 PROCEDURE — 99207 PR FOOT EXAM NO CHARGE: CPT | Mod: 25 | Performed by: PHYSICIAN ASSISTANT

## 2020-11-30 PROCEDURE — 90732 PPSV23 VACC 2 YRS+ SUBQ/IM: CPT | Performed by: PHYSICIAN ASSISTANT

## 2020-11-30 PROCEDURE — 80061 LIPID PANEL: CPT | Performed by: PHYSICIAN ASSISTANT

## 2020-11-30 PROCEDURE — 90662 IIV NO PRSV INCREASED AG IM: CPT | Performed by: PHYSICIAN ASSISTANT

## 2020-11-30 PROCEDURE — G0008 ADMIN INFLUENZA VIRUS VAC: HCPCS | Performed by: PHYSICIAN ASSISTANT

## 2020-11-30 PROCEDURE — 99397 PER PM REEVAL EST PAT 65+ YR: CPT | Mod: 25 | Performed by: PHYSICIAN ASSISTANT

## 2020-11-30 PROCEDURE — 83036 HEMOGLOBIN GLYCOSYLATED A1C: CPT | Performed by: PHYSICIAN ASSISTANT

## 2020-11-30 PROCEDURE — 80053 COMPREHEN METABOLIC PANEL: CPT | Performed by: PHYSICIAN ASSISTANT

## 2020-11-30 RX ORDER — LISINOPRIL 20 MG/1
20 TABLET ORAL DAILY
Qty: 90 TABLET | Refills: 3 | Status: SHIPPED | OUTPATIENT
Start: 2020-11-30 | End: 2021-09-13

## 2020-11-30 RX ORDER — GLIPIZIDE 10 MG/1
10 TABLET, FILM COATED, EXTENDED RELEASE ORAL 2 TIMES DAILY
Qty: 180 TABLET | Refills: 1 | Status: SHIPPED | OUTPATIENT
Start: 2020-11-30 | End: 2021-08-18

## 2020-11-30 RX ORDER — ATORVASTATIN CALCIUM 40 MG/1
40 TABLET, FILM COATED ORAL DAILY
Qty: 90 TABLET | Refills: 3 | Status: SHIPPED | OUTPATIENT
Start: 2020-11-30 | End: 2021-09-13

## 2020-11-30 ASSESSMENT — ENCOUNTER SYMPTOMS
NERVOUS/ANXIOUS: 0
HEARTBURN: 1
COUGH: 0
MYALGIAS: 0
ABDOMINAL PAIN: 0
DIARRHEA: 0
HEADACHES: 0
JOINT SWELLING: 0
HEMATURIA: 0
WEAKNESS: 0
CHILLS: 0
BREAST MASS: 0
DYSURIA: 0
HEMATOCHEZIA: 0
ARTHRALGIAS: 0
CONSTIPATION: 0
FEVER: 0
DIZZINESS: 0
PALPITATIONS: 0
FREQUENCY: 0
PARESTHESIAS: 0
SORE THROAT: 0
SHORTNESS OF BREATH: 0
EYE PAIN: 0
NAUSEA: 0

## 2020-11-30 ASSESSMENT — MIFFLIN-ST. JEOR: SCORE: 1534.95

## 2020-11-30 ASSESSMENT — ACTIVITIES OF DAILY LIVING (ADL): CURRENT_FUNCTION: NO ASSISTANCE NEEDED

## 2020-11-30 NOTE — PROGRESS NOTES
Prior to immunization administration, verified patients identity using patient s name and date of birth. Please see Immunization Activity for additional information.     Screening Questionnaire for Adult Immunization    Are you sick today?   No   Do you have allergies to medications, food, a vaccine component or latex?   No   Have you ever had a serious reaction after receiving a vaccination?   No   Do you have a long-term health problem with heart, lung, kidney, or metabolic disease (e.g., diabetes), asthma, a blood disorder, no spleen, complement component deficiency, a cochlear implant, or a spinal fluid leak?  Are you on long-term aspirin therapy?   No   Do you have cancer, leukemia, HIV/AIDS, or any other immune system problem?   No   Do you have a parent, brother, or sister with an immune system problem?   No   In the past 3 months, have you taken medications that affect  your immune system, such as prednisone, other steroids, or anticancer drugs; drugs for the treatment of rheumatoid arthritis, Crohn s disease, or psoriasis; or have you had radiation treatments?   No   Have you had a seizure, or a brain or other nervous system problem?   No   During the past year, have you received a transfusion of blood or blood    products, or been given immune (gamma) globulin or antiviral drug?   No   For women: Are you pregnant or is there a chance you could become       pregnant during the next month?   No   Have you received any vaccinations in the past 4 weeks?   No     Immunization questionnaire answers were all negative.        Per orders of travon paulino, injection of pnuemovax 23 and flu shot given by Roni Vega CMA. Patient instructed to remain in clinic for 15 minutes afterwards, and to report any adverse reaction to me immediately.       Screening performed by Roni Vega CMA on 11/30/2020 at 8:19 AM.

## 2020-11-30 NOTE — PROGRESS NOTES
"SUBJECTIVE:   Janet Ram is a 65 year old female who presents for Preventive Visit.      Patient has been advised of split billing requirements and indicates understanding: No   Are you in the first 12 months of your Medicare coverage?  No    Healthy Habits:     In general, how would you rate your overall health?  Good    Frequency of exercise:  None    Do you usually eat at least 4 servings of fruit and vegetables a day, include whole grains    & fiber and avoid regularly eating high fat or \"junk\" foods?  No    Taking medications regularly:  Yes    Medication side effects:  None    Ability to successfully perform activities of daily living:  No assistance needed    Home Safety:  No safety concerns identified    Hearing Impairment:  No hearing concerns    In the past 6 months, have you been bothered by leaking of urine? Yes    In general, how would you rate your overall mental or emotional health?  Good      PHQ-2 Total Score: 0    Additional concerns today:  No    Do you feel safe in your environment? Yes    Have you ever done Advance Care Planning? (For example, a Health Directive, POLST, or a discussion with a medical provider or your loved ones about your wishes): No, advance care planning information given to patient to review.  Patient plans to discuss their wishes with loved ones or provider.        Fall risk  Fallen 2 or more times in the past year?: No  Any fall with injury in the past year?: Yes    Cognitive Screening   1) Repeat 3 items (Leader, Season, Table)    2) Clock draw: NORMAL  3) 3 item recall: Recalls 3 objects  Results: 3 items recalled: COGNITIVE IMPAIRMENT LESS LIKELY    Mini-CogTM Copyright NIDA Heller. Licensed by the author for use in [facility]; reprinted with permission ([email or web address]). All rights reserved.      Do you have sleep apnea, excessive snoring or daytime drowsiness?: no    Reviewed and updated as needed this visit by clinical staff  Tobacco  Allergies  Meds  " Problems  Med Hx  Surg Hx  Fam Hx  Soc Hx          Reviewed and updated as needed this visit by Provider  Tobacco  Allergies  Meds  Problems  Med Hx  Surg Hx  Fam Hx         Social History     Tobacco Use     Smoking status: Never Smoker     Smokeless tobacco: Never Used   Substance Use Topics     Alcohol use: Yes     Alcohol/week: 0.0 standard drinks     Comment: rare         Alcohol Use 11/30/2020   Prescreen: >3 drinks/day or >7 drinks/week? No   Prescreen: >3 drinks/day or >7 drinks/week? -           Diabetes Follow-up    How often are you checking your blood sugar? One time daily  What time of day are you checking your blood sugars (select all that apply)?  Before meals  Have you had any blood sugars above 200?  Yes typically under 200  Have you had any blood sugars below 70?  No    What symptoms do you notice when your blood sugar is low?  None    What concerns do you have today about your diabetes? Other: needs to exercise more per pt     Do you have any of these symptoms? (Select all that apply)  Weight gain    Have you had a diabetic eye exam in the last 12 months? No has delayed this due to COVID                Hyperlipidemia Follow-Up      Are you regularly taking any medication or supplement to lower your cholesterol?   Yes- atorvastatin    Are you having muscle aches or other side effects that you think could be caused by your cholesterol lowering medication?  No    Hypertension Follow-up      Do you check your blood pressure regularly outside of the clinic? No     Are you following a low salt diet? Yes    Are your blood pressures ever more than 140 on the top number (systolic) OR more   than 90 on the bottom number (diastolic), for example 140/90? unknown    BP Readings from Last 2 Encounters:   11/30/20 132/74   10/16/19 118/68     Hemoglobin A1C (%)   Date Value   11/30/2020 9.4 (H)   03/18/2020 8.1 (H)     LDL Cholesterol Calculated (mg/dL)   Date Value   11/30/2020 44   03/18/2020 54          Current providers sharing in care for this patient include:   Patient Care Team:  Estephanie Mancia PA-C as PCP - General (Physician Assistant)  Estephanie Mancia PA-C as Assigned PCP    The following health maintenance items are reviewed in Epic and correct as of today:  Health Maintenance   Topic Date Due     HIV SCREENING  03/06/1970     ZOSTER IMMUNIZATION (1 of 2) 03/06/2005     EYE EXAM  04/03/2020     MICROALBUMIN  03/18/2021     FALL RISK ASSESSMENT  03/24/2021     A1C  05/30/2021     DTAP/TDAP/TD IMMUNIZATION (2 - Td) 06/22/2021     COLORECTAL CANCER SCREENING  08/08/2021     MEDICARE ANNUAL WELLNESS VISIT  11/30/2021     BMP  11/30/2021     CMP  11/30/2021     LIPID  11/30/2021     DIABETIC FOOT EXAM  11/30/2021     MAMMO SCREENING  02/28/2022     ADVANCE CARE PLANNING  11/30/2025     HEPATITIS C SCREENING  Completed     PHQ-2  Completed     INFLUENZA VACCINE  Completed     Pneumococcal Vaccine: 65+ Years  Completed     Pneumococcal Vaccine: Pediatrics (0 to 5 Years) and At-Risk Patients (6 to 64 Years)  Aged Out     IPV IMMUNIZATION  Aged Out     MENINGITIS IMMUNIZATION  Aged Out     Labs reviewed in EPIC  BP Readings from Last 3 Encounters:   11/30/20 132/74   10/16/19 118/68   04/09/19 126/70    Wt Readings from Last 3 Encounters:   11/30/20 99.7 kg (219 lb 12.8 oz)   10/16/19 98.4 kg (217 lb)   04/09/19 96.2 kg (212 lb)                  Patient Active Problem List   Diagnosis     Enthesopathy     PURE HYPERCHOLESTEROLEM     Hyperlipidemia LDL goal <100     Type 2 diabetes, HbA1c goal < 7% (H)     Advanced directives, counseling/discussion     Hypertension goal BP (blood pressure) < 140/90     Diverticulosis of large intestine     Obstipation     Gallbladder calculus with possible cholecystitis/choledocolithiasis history     Constipation, chronic     Elevated bilirubin     Lower abdominal pain     Uncontrolled type 2 diabetes mellitus without complication, with long-term current use of insulin      Morbid obesity (H)     Uncontrolled type 2 diabetes mellitus without complication, without long-term current use of insulin     Hashimoto's thyroiditis     Past Surgical History:   Procedure Laterality Date     COLONOSCOPY N/A 3/17/2015    Procedure: COLONOSCOPY;  Surgeon: Mike Elizondo MD;  Location:  GI     COLONOSCOPY N/A 3/18/2015    Procedure: COMBINED COLONOSCOPY, SINGLE OR MULTIPLE BIOPSY/POLYPECTOMY BY BIOPSY;  Surgeon: Mike Elizondo MD;  Location:  GI     COLONOSCOPY N/A 8/8/2018    Procedure: COLONOSCOPY;  COLONOSCOPY;  Surgeon: Josse Bender DO;  Location:  GI       Social History     Tobacco Use     Smoking status: Never Smoker     Smokeless tobacco: Never Used   Substance Use Topics     Alcohol use: Yes     Alcohol/week: 0.0 standard drinks     Comment: rare     Family History   Problem Relation Age of Onset     Diabetes Mother      C.A.D. Mother         in her 60's     Diabetes Father      C.A.D. Father         in his 50's         Current Outpatient Medications   Medication Sig Dispense Refill     Alcohol Swabs (ALCOHOL WIPES) PADS 1 each daily Use to test blood sugar 1 times dailyPlease give patient meter, test trips and lancets as covered by insurance. 100 each 3     ASPIRIN 81 MG OR TABS 1 TABLET DAILY       atorvastatin (LIPITOR) 40 MG tablet Take 1 tablet (40 mg) by mouth daily 90 tablet 3     B-D U/F 31G X 8 MM insulin pen needle USE ONE  ONCE DAILY OR  AS  DIRECTED 100 each 11     blood glucose (NO BRAND SPECIFIED) test strip Use to test blood sugar 1 times daily or as directed. Please give patient meter, test trips and lancets as covered by insurance. 100 each 3     blood glucose monitoring (ACCU-CHEK FASTCLIX) lancets USE 1 LANCET TO CHECK GLUCOSE ONCE DAILY OR  AS  DIRECTED or brand per insuance 100 each 3     blood glucose monitoring (NO BRAND SPECIFIED) meter device kit Use to test blood sugar 1 times daily or as directed. Please give patient meter, test  trips and lancets as covered by insurance. 1 kit 0     dulaglutide (TRULICITY) 1.5 MG/0.5ML pen Inject 1.5 mg Subcutaneous every 7 days 6 mL 5     glipiZIDE (GLUCOTROL XL) 10 MG 24 hr tablet Take 1 tablet (10 mg) by mouth 2 times daily 180 tablet 1     levothyroxine (SYNTHROID/LEVOTHROID) 50 MCG tablet Take 1 tablet (50 mcg) by mouth daily 90 tablet 3     lisinopril (ZESTRIL) 20 MG tablet Take 1 tablet (20 mg) by mouth daily 90 tablet 3     metFORMIN (GLUCOPHAGE) 1000 MG tablet TAKE 1 TABLET BY MOUTH TWICE DAILY WITH MEALS 180 tablet 1     ORDER FOR DME Equipment being ordered: needles for solostar flex pen 1 Box prn     polyethylene glycol (MIRALAX/GLYCOLAX) powder Take 17 g (1 capful) by mouth daily 850 g 0     Allergies   Allergen Reactions     Penicillins      Too young to remember reaction     Pneumonia Vaccine:Adults age 65+ who received their first dose of Pneumovax (PPSV23) prior to age 65 years: Should be given PCV 13 > 1 year after their most recent PPSV23 AND should be given a another dose of PPSV23 > 5 years after their most recent dose of PPSV23  Mammogram Screening: Mammogram Screening: Patient over age 50, mutual decision to screen reflected in health maintenance.  Last 3 Pap and HPV Results:   PAP / HPV Latest Ref Rng & Units 9/10/2015 4/2/2012   PAP - NIL NIL   HPV 16 DNA NEG Negative -   HPV 18 DNA NEG Negative -   OTHER HR HPV NEG Negative -       Review of Systems   Constitutional: Negative for chills and fever.   HENT: Negative for congestion, ear pain, hearing loss and sore throat.    Eyes: Negative for pain and visual disturbance.   Respiratory: Negative for cough and shortness of breath.    Cardiovascular: Negative for chest pain, palpitations and peripheral edema.   Gastrointestinal: Positive for heartburn. Negative for abdominal pain, constipation, diarrhea, hematochezia and nausea.   Breasts:  Negative for tenderness, breast mass and discharge.   Genitourinary: Negative for dysuria,  "frequency, genital sores, hematuria, pelvic pain, urgency, vaginal bleeding and vaginal discharge.   Musculoskeletal: Negative for arthralgias, joint swelling and myalgias.   Skin: Negative for rash.   Neurological: Negative for dizziness, weakness, headaches and paresthesias.   Psychiatric/Behavioral: Negative for mood changes. The patient is not nervous/anxious.          OBJECTIVE:   /74 (BP Location: Right arm, Patient Position: Chair, Cuff Size: Adult Regular)   Pulse 80   Temp 97.2  F (36.2  C) (Temporal)   Resp 20   Ht 1.638 m (5' 4.5\")   Wt 99.7 kg (219 lb 12.8 oz)   LMP  (LMP Unknown)   SpO2 99%   Breastfeeding No   BMI 37.15 kg/m   Estimated body mass index is 37.15 kg/m  as calculated from the following:    Height as of this encounter: 1.638 m (5' 4.5\").    Weight as of this encounter: 99.7 kg (219 lb 12.8 oz).  Physical Exam  GENERAL APPEARANCE: healthy, alert and no distress  EYES: Eyes grossly normal to inspection, PERRL and conjunctivae and sclerae normal  HENT: ear canals and TM's normal, nose and mouth without ulcers or lesions, oropharynx clear and oral mucous membranes moist  NECK: no adenopathy, no asymmetry, masses, or scars and thyroid normal to palpation  RESP: lungs clear to auscultation - no rales, rhonchi or wheezes  BREAST: normal without masses, tenderness or nipple discharge and no palpable axillary masses or adenopathy  CV: regular rate and rhythm, normal S1 S2, no S3 or S4, no murmur, click or rub, no peripheral edema and peripheral pulses strong  ABDOMEN: soft, nontender, no hepatosplenomegaly, no masses and bowel sounds normal  MS: no musculoskeletal defects are noted and gait is age appropriate without ataxia  SKIN: no suspicious lesions or rashes  NEURO: Normal strength and tone, sensory exam grossly normal, mentation intact and speech normal  DIABETIC FOOT EXAM: normal DP and PT pulses, no trophic changes or ulcerative lesions, normal sensory exam and normal " monofilament exam  PSYCH: mentation appears normal and affect normal/bright    Diagnostic Test Results:  Labs reviewed in Epic  Results for orders placed or performed in visit on 11/30/20 (from the past 24 hour(s))   Lipid panel reflex to direct LDL Fasting   Result Value Ref Range    Cholesterol 125 <200 mg/dL    Triglycerides 180 (H) <150 mg/dL    HDL Cholesterol 45 (L) >49 mg/dL    LDL Cholesterol Calculated 44 <100 mg/dL    Non HDL Cholesterol 80 <130 mg/dL   **Comprehensive metabolic panel FUTURE anytime   Result Value Ref Range    Sodium 143 133 - 144 mmol/L    Potassium 4.1 3.4 - 5.3 mmol/L    Chloride 110 (H) 94 - 109 mmol/L    Carbon Dioxide 23 20 - 32 mmol/L    Anion Gap 10 3 - 14 mmol/L    Glucose 205 (H) 70 - 99 mg/dL    Urea Nitrogen 13 7 - 30 mg/dL    Creatinine 0.79 0.52 - 1.04 mg/dL    GFR Estimate 78 >60 mL/min/[1.73_m2]    GFR Estimate If Black >90 >60 mL/min/[1.73_m2]    Calcium 9.1 8.5 - 10.1 mg/dL    Bilirubin Total 1.6 (H) 0.2 - 1.3 mg/dL    Albumin 3.5 3.4 - 5.0 g/dL    Protein Total 7.1 6.8 - 8.8 g/dL    Alkaline Phosphatase 113 40 - 150 U/L    ALT 30 0 - 50 U/L    AST 16 0 - 45 U/L   **A1C FUTURE anytime   Result Value Ref Range    Hemoglobin A1C 9.4 (H) 0 - 5.6 %       ASSESSMENT / PLAN:   1. Routine general medical examination at a health care facility  Recommended routine screening    2. Screening for HIV (human immunodeficiency virus)  Declined    3. Need for vaccination  Updated  - FLUZONE HIGH DOSE 65+  [32509]  - Pneumococcal vaccine 23 valent PPSV23  (Pneumovax) [52609]    4. Benign essential hypertension  At goal, continue current meds recheck 6 months  - lisinopril (ZESTRIL) 20 MG tablet; Take 1 tablet (20 mg) by mouth daily  Dispense: 90 tablet; Refill: 3    5. Type 2 diabetes mellitus with hyperglycemia, unspecified whether long term insulin use (H)  Poorly controlled, will contact her to develop a plan  - Lipid panel reflex to direct LDL Fasting  - **Comprehensive metabolic  "panel FUTURE anytime  - **A1C FUTURE anytime  - FOOT EXAM  - atorvastatin (LIPITOR) 40 MG tablet; Take 1 tablet (40 mg) by mouth daily  Dispense: 90 tablet; Refill: 3  - dulaglutide (TRULICITY) 1.5 MG/0.5ML pen; Inject 1.5 mg Subcutaneous every 7 days  Dispense: 6 mL; Refill: 5  - glipiZIDE (GLUCOTROL XL) 10 MG 24 hr tablet; Take 1 tablet (10 mg) by mouth 2 times daily  Dispense: 180 tablet; Refill: 1  - metFORMIN (GLUCOPHAGE) 1000 MG tablet; TAKE 1 TABLET BY MOUTH TWICE DAILY WITH MEALS  Dispense: 180 tablet; Refill: 1    6. Hyperlipidemia LDL goal <100  Await results likely continue current meds  - Lipid panel reflex to direct LDL Fasting  - **Comprehensive metabolic panel FUTURE anytime  - atorvastatin (LIPITOR) 40 MG tablet; Take 1 tablet (40 mg) by mouth daily  Dispense: 90 tablet; Refill: 3    7. Hypertension goal BP (blood pressure) < 140/90  At goal, continue current meds  - **Comprehensive metabolic panel FUTURE anytime  - atorvastatin (LIPITOR) 40 MG tablet; Take 1 tablet (40 mg) by mouth daily  Dispense: 90 tablet; Refill: 3    8. Encounter for screening mammogram for breast cancer  Patient will schedule  - *MA Screening Digital Bilateral; Future    Patient has been advised of split billing requirements and indicates understanding: Yes  COUNSELING:  Reviewed preventive health counseling, as reflected in patient instructions    Estimated body mass index is 37.15 kg/m  as calculated from the following:    Height as of this encounter: 1.638 m (5' 4.5\").    Weight as of this encounter: 99.7 kg (219 lb 12.8 oz).    Weight management plan: Discussed healthy diet and exercise guidelines    She reports that she has never smoked. She has never used smokeless tobacco.      Appropriate preventive services were discussed with this patient, including applicable screening as appropriate for cardiovascular disease, diabetes, osteopenia/osteoporosis, and glaucoma.  As appropriate for age/gender, discussed screening for " colorectal cancer, prostate cancer, breast cancer, and cervical cancer. Checklist reviewing preventive services available has been given to the patient.    Reviewed patients plan of care and provided an AVS. The Basic Care Plan (routine screening as documented in Health Maintenance) for Janet meets the Care Plan requirement. This Care Plan has been established and reviewed with the Patient.    Counseling Resources:  ATP IV Guidelines  Pooled Cohorts Equation Calculator  Breast Cancer Risk Calculator  Breast Cancer: Medication to Reduce Risk  FRAX Risk Assessment  ICSI Preventive Guidelines  Dietary Guidelines for Americans, 2010  USDA's MyPlate  ASA Prophylaxis  Lung CA Screening    HALINA Grimm [facility]    Identified Health Risks:

## 2020-12-01 ENCOUNTER — TELEPHONE (OUTPATIENT)
Dept: FAMILY MEDICINE | Facility: OTHER | Age: 65
End: 2020-12-01

## 2020-12-01 NOTE — TELEPHONE ENCOUNTER
Please call patient and mail results if she is interested.  Her cholesterol has improved slightly.  Her triglycerides are still too high but they are improved from previously.  Her LDL continues to be well controlled with the use of atorvastatin.  Her electrolytes, kidney functions and liver function tests are normal.  Her blood sugar was 205 at the time of the test.  Her hemoglobin A1c has jumped up to 9.4.  Previously it was 8.1.  I would like to increase her dosage of Trulicity at this point.  I would like to increase it to  3 mg.  And I would like her to improve her diet and exercise.  She should monitor her blood sugars and let me know if her blood sugars are too low.  I would like to recheck her hemoglobin A1c in 3 months.  If she is interested in the higher dosage of Trulicity please gia this back for me.  Estephanie Mancia PA-C

## 2020-12-01 NOTE — TELEPHONE ENCOUNTER
Patient would like to wait on the refill of Truclicity because she has about 2 months left and it is very spendy. Patient would like results mailed to her.    Joanne Young MA

## 2020-12-01 NOTE — LETTER
[facility]  [address] [facility] [address]  Phone: [phone]    12/01/20    Janet Napierky  [address]   [place] [address]          Janet,     Here are your lab results as discussed.       Cholesterol has improved slightly.  Triglycerides are still too high but they are improved from previously.  LDL continues to be well controlled with the use of atorvastatin.  Your electrolytes, kidney functions and liver function tests are normal.  Blood sugar was 205 at the time of the test.  Hhemoglobin A1c has jumped up to 9.4, previously it was 8.1.      We would like to increase your dosage of Trulicity at this point. (which you had declined at this time)  Estephanie Mancia would like to increase it to  3 mg.  And to improve your diet and exercise. Recommend monitoring her blood sugars and let us know if your blood sugars are too low.  Estephanie would like to recheck your hemoglobin A1c in 3 months.     Sincerely,  Your [facility] Care Team                    Component      Latest Ref Rng & Units 11/30/2020   Sodium      133 - 144 mmol/L 143   Potassium      3.4 - 5.3 mmol/L 4.1   Chloride      94 - 109 mmol/L 110 (H)   Carbon Dioxide      20 - 32 mmol/L 23   Anion Gap      3 - 14 mmol/L 10   Glucose      70 - 99 mg/dL 205 (H)   Urea Nitrogen      7 - 30 mg/dL 13   Creatinine      0.52 - 1.04 mg/dL 0.79   GFR Estimate      >60 mL/min/1.73:m2 78   GFR Estimate If Black      >60 mL/min/1.73:m2 >90   Calcium      8.5 - 10.1 mg/dL 9.1   Bilirubin Total      0.2 - 1.3 mg/dL 1.6 (H)   Albumin      3.4 - 5.0 g/dL 3.5   Protein Total      6.8 - 8.8 g/dL 7.1   Alkaline Phosphatase      40 - 150 U/L 113   ALT      0 - 50 U/L 30   AST      0 - 45 U/L 16   Cholesterol      <200 mg/dL 125   Triglycerides      <150 mg/dL 180 (H)   HDL Cholesterol      >49 mg/dL 45 (L)   LDL Cholesterol Calculated      <100 mg/dL 44   Non HDL Cholesterol      <130  mg/dL 80   Hemoglobin A1C      0 - 5.6 % 9.4 (H)

## 2020-12-29 ENCOUNTER — TELEPHONE (OUTPATIENT)
Dept: FAMILY MEDICINE | Facility: OTHER | Age: 65
End: 2020-12-29

## 2020-12-30 NOTE — TELEPHONE ENCOUNTER
Prior Authorization Approval    Authorization Effective Date: 1/1/2021  Authorization Expiration Date: 1/1/2022  Medication: dulaglutide (TRULICITY) 1.5 MG/0.5ML pen -APPROVED  Approved Dose/Quantity:   Reference #:     Insurance Company: [employer] Clinical Review - Phone [phone] Fax [phone]  Expected CoPay:       CoPay Card Available:      Foundation Assistance Needed:    Which Pharmacy is filling the prescription (Not needed for infusion/clinic administered): [facility] [address] [place] [id number] [facility]  Pharmacy Notified: Yes  Patient Notified: No    Pharmacy has been notified of the new dates.

## 2020-12-30 NOTE — TELEPHONE ENCOUNTER
Central Prior Authorization Team   Phone: [phone]      PA Initiation    Medication: dulaglutide (TRULICITY) 1.5 MG/0.5ML pen -Initiated  Insurance Company: [employer] Clinical Review - Phone [phone] Fax [phone]  Pharmacy Filling the Rx: [facility] [id number] [facility] [address] [facility]  Filling Pharmacy Phone: [phone]  Filling Pharmacy Fax:    Start Date: 12/30/2020

## 2021-03-12 NOTE — PROGRESS NOTES
Assessment & Plan     Type 2 diabetes mellitus with hyperglycemia, unspecified whether long term insulin use (H)  Await A1c, hope to see improvement, I would increase Trulicity to 3 mg every 7 days if A1c still not to goal encouraged her to continue walking  - Albumin Random Urine Quantitative with Creat Ratio  - Hemoglobin A1c  - FOOT EXAM    Morbid obesity (H)  She is starting to walk with a neighbor, I have encouraged daily exercise and to continue to monitor her diet    Hyperlipidemia LDL goal <100  Doing well on atorvastatin, does not need refills at this time labs are up-to-date    Hypertension goal BP (blood pressure) < 140/90  At goal, continue current meds continue sodium avoidance and exercise to improve blood pressure as well    Hashimoto's thyroiditis  Await TSH, will need refill of levothyroxine  - TSH with free T4 reflex    Menopause  Patient willing to do DEXA scan please schedule  - DEXA HIP/PELVIS/SPINE - Future; Future                 Return in about 6 months (around 9/16/2021) for diabetes, BP Recheck.    Estephaine Mancia PA-C  [facility] JOAO Gonzales is a 66 year old who presents for the following health issues     History of Present Illness       Diabetes:   She presents for follow up of diabetes.  She is checking home blood glucose one time daily. She checks blood glucose before meals.  Blood glucose is sometimes over 200 and never under 70. She is aware of hypoglycemia symptoms including shakiness. She has no concerns regarding her diabetes at this time.  She is not experiencing numbness or burning in feet, excessive thirst, blurry vision, weight changes or redness, sores or blisters on feet. The patient has not had a diabetic eye exam in the last 12 months.         She eats 0-1 servings of fruits and vegetables daily.She consumes 0 sweetened beverage(s) daily.She exercises with enough effort to increase her heart rate 9 or less minutes per day.  She  exercises with enough effort to increase her heart rate 3 or less days per week.   She is taking medications regularly.         Hyperlipidemia Follow-Up      Are you regularly taking any medication or supplement to lower your cholesterol?   Yes- atorvastatin    Are you having muscle aches or other side effects that you think could be caused by your cholesterol lowering medication?  No    Hypertension Follow-up      Do you check your blood pressure regularly outside of the clinic? No     Are you following a low salt diet? Yes    Are your blood pressures ever more than 140 on the top number (systolic) OR more   than 90 on the bottom number (diastolic), for example 140/90? unknown    Hypothyroidism Follow-up      Since last visit, patient describes the following symptoms: Weight stable, no hair loss, no skin changes, no constipation, no loose stools      How many servings of fruits and vegetables do you eat daily?  2-3    On average, how many sweetened beverages do you drink each day (Examples: soda, juice, sweet tea, etc.  Do NOT count diet or artificially sweetened beverages)?   0    How many days per week do you exercise enough to make your heart beat faster? 3 or less    How many minutes a day do you exercise enough to make your heart beat faster? 10 - 19    How many days per week do you miss taking your medication? 0        Review of Systems   CONSTITUTIONAL: NEGATIVE for fever, chills, change in weight, patient did lose about 10 pounds from her last visit.  She reports she got very ill earlier in the winter and lost 10 pounds due to body aches and pains and diarrhea.  Perhaps she had Covid, she is not sure she was not tested.  She has been able to keep the weight off.  She is starting to exercise with a friend by going on walks in the neighborhood  ENT/MOUTH: NEGATIVE for ear, mouth and throat problems  RESP: NEGATIVE for significant cough or SOB  CV: NEGATIVE for chest pain, palpitations or peripheral edema  GI:  NEGATIVE for nausea, abdominal pain, heartburn, or change in bowel habits  PSYCHIATRIC: NEGATIVE for changes in mood or affect      Objective    LMP  (LMP Unknown)   There is no height or weight on file to calculate BMI.  Physical Exam   GENERAL: healthy, alert and no distress  NECK: no adenopathy, no asymmetry, masses, or scars and thyroid normal to palpation  RESP: lungs clear to auscultation - no rales, rhonchi or wheezes  CV: regular rate and rhythm, normal S1 S2, no S3 or S4, no murmur, click or rub, no peripheral edema and peripheral pulses strong  ABDOMEN: soft, nontender, no hepatosplenomegaly, no masses and bowel sounds normal  MS: no gross musculoskeletal defects noted, no edema  NEURO: Normal strength and tone, mentation intact and speech normal  PSYCH: mentation appears normal, affect normal/bright  Diabetic foot exam: normal DP and PT pulses, no trophic changes or ulcerative lesions, normal sensory exam and normal monofilament exam    No results found for any visits on 03/16/21.

## 2021-03-16 ENCOUNTER — OFFICE VISIT (OUTPATIENT)
Dept: FAMILY MEDICINE | Facility: OTHER | Age: 66
End: 2021-03-16
Payer: COMMERCIAL

## 2021-03-16 VITALS
SYSTOLIC BLOOD PRESSURE: 130 MMHG | WEIGHT: 211 LBS | OXYGEN SATURATION: 98 % | TEMPERATURE: 96.5 F | HEART RATE: 79 BPM | HEIGHT: 65 IN | DIASTOLIC BLOOD PRESSURE: 80 MMHG | BODY MASS INDEX: 35.16 KG/M2

## 2021-03-16 DIAGNOSIS — E78.5 HYPERLIPIDEMIA LDL GOAL <100: Primary | ICD-10-CM

## 2021-03-16 DIAGNOSIS — E11.65 TYPE 2 DIABETES MELLITUS WITH HYPERGLYCEMIA, UNSPECIFIED WHETHER LONG TERM INSULIN USE (H): ICD-10-CM

## 2021-03-16 DIAGNOSIS — I10 HYPERTENSION GOAL BP (BLOOD PRESSURE) < 140/90: ICD-10-CM

## 2021-03-16 DIAGNOSIS — E06.3 HASHIMOTO'S THYROIDITIS: ICD-10-CM

## 2021-03-16 DIAGNOSIS — E66.01 MORBID OBESITY (H): ICD-10-CM

## 2021-03-16 DIAGNOSIS — Z78.0 MENOPAUSE: ICD-10-CM

## 2021-03-16 LAB
CREAT UR-MCNC: 129 MG/DL
HBA1C MFR BLD: 8.8 % (ref 0–5.6)
MICROALBUMIN UR-MCNC: 14 MG/L
MICROALBUMIN/CREAT UR: 10.78 MG/G CR (ref 0–25)
TSH SERPL DL<=0.005 MIU/L-ACNC: 3.19 MU/L (ref 0.4–4)

## 2021-03-16 PROCEDURE — 99207 PR FOOT EXAM NO CHARGE: CPT | Performed by: PHYSICIAN ASSISTANT

## 2021-03-16 PROCEDURE — 83036 HEMOGLOBIN GLYCOSYLATED A1C: CPT | Performed by: PHYSICIAN ASSISTANT

## 2021-03-16 PROCEDURE — 84443 ASSAY THYROID STIM HORMONE: CPT | Performed by: PHYSICIAN ASSISTANT

## 2021-03-16 PROCEDURE — 82043 UR ALBUMIN QUANTITATIVE: CPT | Performed by: PHYSICIAN ASSISTANT

## 2021-03-16 PROCEDURE — 36415 COLL VENOUS BLD VENIPUNCTURE: CPT | Performed by: PHYSICIAN ASSISTANT

## 2021-03-16 PROCEDURE — 99214 OFFICE O/P EST MOD 30 MIN: CPT | Performed by: PHYSICIAN ASSISTANT

## 2021-03-16 ASSESSMENT — MIFFLIN-ST. JEOR: SCORE: 1492.35

## 2021-03-18 ENCOUNTER — ANCILLARY PROCEDURE (OUTPATIENT)
Dept: MAMMOGRAPHY | Facility: OTHER | Age: 66
End: 2021-03-18
Attending: PHYSICIAN ASSISTANT
Payer: COMMERCIAL

## 2021-03-18 ENCOUNTER — TELEPHONE (OUTPATIENT)
Dept: FAMILY MEDICINE | Facility: OTHER | Age: 66
End: 2021-03-18

## 2021-03-18 DIAGNOSIS — E11.65 TYPE 2 DIABETES MELLITUS WITH HYPERGLYCEMIA, UNSPECIFIED WHETHER LONG TERM INSULIN USE (H): ICD-10-CM

## 2021-03-18 DIAGNOSIS — Z12.31 ENCOUNTER FOR SCREENING MAMMOGRAM FOR BREAST CANCER: ICD-10-CM

## 2021-03-18 PROCEDURE — 77067 SCR MAMMO BI INCL CAD: CPT | Mod: TC | Performed by: RADIOLOGY

## 2021-03-18 PROCEDURE — 77063 BREAST TOMOSYNTHESIS BI: CPT | Mod: TC | Performed by: RADIOLOGY

## 2021-03-18 NOTE — LETTER
[facility]  [address]  [place] [address]  Phone: [phone]    03/19/21    Janet Ram  [address]   [place] [address]          Janet,     We have tried reaching you via phone and have been unsuccessful.     We are writing to inform you of your most recent lab results.     Your urine protein and thyroid are normal. Your hemoglobin A1c is slightly better but it is still higher than it should be. It is now 8.8, previously it was 9.4. I recommend increasing your dosage of Trulicity to 3mg every 7 days. Please let us know if you are willing to make this increase and I will send a new prescription.     Estephanie Mancia PA-C    Component      Latest Ref Rng & Units 3/16/2021   Creatinine Urine      mg/dL 129   Albumin Urine mg/L      mg/L 14   Albumin Urine mg/g Cr      0 - 25 mg/g Cr 10.78   Hemoglobin A1C      0 - 5.6 % 8.8 (H)   TSH      0.40 - 4.00 mU/L 3.19

## 2021-03-18 NOTE — TELEPHONE ENCOUNTER
Please call patient, your urine protein and thyroid are normal.  Your hemoglobin A1c is slightly better but it is still higher than it should be.  It is now 8.8, previously it was 9.4 I recommend increasing her dosage of Trulicity to 3 mg every 7 days.  Please let me know if she is willing to make this increase and I will send in a new prescription  Estephanie Mancia PA-C

## 2021-03-22 ENCOUNTER — HOSPITAL ENCOUNTER (OUTPATIENT)
Dept: BONE DENSITY | Facility: CLINIC | Age: 66
Discharge: HOME | End: 2021-03-22
Attending: PHYSICIAN ASSISTANT | Admitting: PHYSICIAN ASSISTANT
Payer: COMMERCIAL

## 2021-03-22 DIAGNOSIS — Z78.0 MENOPAUSE: ICD-10-CM

## 2021-03-22 PROCEDURE — 77080 DXA BONE DENSITY AXIAL: CPT

## 2021-03-25 ENCOUNTER — TRANSFERRED RECORDS (OUTPATIENT)
Dept: HEALTH INFORMATION MANAGEMENT | Facility: CLINIC | Age: 66
End: 2021-03-25

## 2021-03-25 LAB — RETINOPATHY: POSITIVE

## 2021-05-06 DIAGNOSIS — E06.3 HASHIMOTO'S THYROIDITIS: ICD-10-CM

## 2021-05-06 RX ORDER — LEVOTHYROXINE SODIUM 50 UG/1
TABLET ORAL
Qty: 90 TABLET | Refills: 2 | Status: SHIPPED | OUTPATIENT
Start: 2021-05-06 | End: 2022-02-04

## 2021-05-06 NOTE — TELEPHONE ENCOUNTER
Prescription approved per [facility] Refill Protocol.    Malou Hayes RN on 5/6/2021 at 4:23 PM

## 2021-06-11 DIAGNOSIS — E11.65 TYPE 2 DIABETES MELLITUS WITH HYPERGLYCEMIA, UNSPECIFIED WHETHER LONG TERM INSULIN USE (H): ICD-10-CM

## 2021-06-11 NOTE — TELEPHONE ENCOUNTER
Medication is being filled for 1 time refill only due to:  Patient needs labs a1c. lab only now as a1c is out of range  Due for DM visit in Sept  Please call and help schedule .  LUIGI GuillenN, RN, PHN  [facility] ~ Registered Nurse  Clinic Triage ~ [place] [facility] [place]  June 11, 2021      I spoke to the patient and let her know that the above medication was called in to her pharmacy and made an appointment to see her PCP and have labs done on 09/07  DM

## 2021-08-17 DIAGNOSIS — E11.65 TYPE 2 DIABETES MELLITUS WITH HYPERGLYCEMIA, UNSPECIFIED WHETHER LONG TERM INSULIN USE (H): ICD-10-CM

## 2021-08-18 RX ORDER — GLIPIZIDE 10 MG/1
TABLET, FILM COATED, EXTENDED RELEASE ORAL
Qty: 180 TABLET | Refills: 0 | Status: SHIPPED | OUTPATIENT
Start: 2021-08-18 | End: 2021-09-13

## 2021-08-18 NOTE — TELEPHONE ENCOUNTER
Pending Prescriptions:                       Disp   Refills    glipiZIDE (GLUCOTROL XL) 10 MG 24 hr tabl*180 ta*0            Sig: Take 1 tablet by mouth twice daily    Medication is being filled for 1 time ej refill only due to:  Patient is due for diabetes follow up    Please call and help schedule.  Thank you!    Malou Hayes RN on 8/18/2021 at 4:51 PM

## 2021-09-13 ENCOUNTER — OFFICE VISIT (OUTPATIENT)
Dept: FAMILY MEDICINE | Facility: OTHER | Age: 66
End: 2021-09-13
Payer: COMMERCIAL

## 2021-09-13 ENCOUNTER — TELEPHONE (OUTPATIENT)
Dept: FAMILY MEDICINE | Facility: OTHER | Age: 66
End: 2021-09-13

## 2021-09-13 VITALS
DIASTOLIC BLOOD PRESSURE: 80 MMHG | BODY MASS INDEX: 36.57 KG/M2 | SYSTOLIC BLOOD PRESSURE: 130 MMHG | HEART RATE: 64 BPM | WEIGHT: 217.4 LBS | OXYGEN SATURATION: 97 % | TEMPERATURE: 98.1 F | RESPIRATION RATE: 20 BRPM

## 2021-09-13 DIAGNOSIS — Z23 NEED FOR PROPHYLACTIC VACCINATION AND INOCULATION AGAINST INFLUENZA: Primary | ICD-10-CM

## 2021-09-13 DIAGNOSIS — E11.3392: ICD-10-CM

## 2021-09-13 DIAGNOSIS — I10 HYPERTENSION GOAL BP (BLOOD PRESSURE) < 140/90: ICD-10-CM

## 2021-09-13 DIAGNOSIS — E78.5 HYPERLIPIDEMIA LDL GOAL <100: ICD-10-CM

## 2021-09-13 DIAGNOSIS — E11.65 TYPE 2 DIABETES MELLITUS WITH HYPERGLYCEMIA, UNSPECIFIED WHETHER LONG TERM INSULIN USE (H): ICD-10-CM

## 2021-09-13 DIAGNOSIS — Z79.4: ICD-10-CM

## 2021-09-13 DIAGNOSIS — I10 BENIGN ESSENTIAL HYPERTENSION: ICD-10-CM

## 2021-09-13 DIAGNOSIS — Z12.11 SCREEN FOR COLON CANCER: ICD-10-CM

## 2021-09-13 LAB
ALBUMIN SERPL-MCNC: 3.6 G/DL (ref 3.4–5)
ALP SERPL-CCNC: 86 U/L (ref 40–150)
ALT SERPL W P-5'-P-CCNC: 29 U/L (ref 0–50)
ANION GAP SERPL CALCULATED.3IONS-SCNC: 7 MMOL/L (ref 3–14)
AST SERPL W P-5'-P-CCNC: 15 U/L (ref 0–45)
BILIRUB SERPL-MCNC: 1.4 MG/DL (ref 0.2–1.3)
BUN SERPL-MCNC: 16 MG/DL (ref 7–30)
CALCIUM SERPL-MCNC: 9.4 MG/DL (ref 8.5–10.1)
CHLORIDE BLD-SCNC: 111 MMOL/L (ref 94–109)
CHOLEST SERPL-MCNC: 135 MG/DL
CO2 SERPL-SCNC: 25 MMOL/L (ref 20–32)
CREAT SERPL-MCNC: 0.97 MG/DL (ref 0.52–1.04)
FASTING STATUS PATIENT QL REPORTED: YES
GFR SERPL CREATININE-BSD FRML MDRD: 61 ML/MIN/1.73M2
GLUCOSE BLD-MCNC: 134 MG/DL (ref 70–99)
HBA1C MFR BLD: 8.4 % (ref 0–5.6)
HDLC SERPL-MCNC: 52 MG/DL
LDLC SERPL CALC-MCNC: 51 MG/DL
NONHDLC SERPL-MCNC: 83 MG/DL
POTASSIUM BLD-SCNC: 4.3 MMOL/L (ref 3.4–5.3)
PROT SERPL-MCNC: 6.9 G/DL (ref 6.8–8.8)
SODIUM SERPL-SCNC: 143 MMOL/L (ref 133–144)
TRIGL SERPL-MCNC: 161 MG/DL

## 2021-09-13 PROCEDURE — 83036 HEMOGLOBIN GLYCOSYLATED A1C: CPT | Performed by: PHYSICIAN ASSISTANT

## 2021-09-13 PROCEDURE — 36415 COLL VENOUS BLD VENIPUNCTURE: CPT | Performed by: PHYSICIAN ASSISTANT

## 2021-09-13 PROCEDURE — 99207 PR FOOT EXAM NO CHARGE: CPT | Performed by: PHYSICIAN ASSISTANT

## 2021-09-13 PROCEDURE — 99214 OFFICE O/P EST MOD 30 MIN: CPT | Performed by: PHYSICIAN ASSISTANT

## 2021-09-13 PROCEDURE — 80053 COMPREHEN METABOLIC PANEL: CPT | Performed by: PHYSICIAN ASSISTANT

## 2021-09-13 PROCEDURE — 80061 LIPID PANEL: CPT | Performed by: PHYSICIAN ASSISTANT

## 2021-09-13 RX ORDER — GLIPIZIDE 10 MG/1
10 TABLET, FILM COATED, EXTENDED RELEASE ORAL 2 TIMES DAILY
Qty: 180 TABLET | Refills: 1 | Status: SHIPPED | OUTPATIENT
Start: 2021-09-13 | End: 2021-11-26

## 2021-09-13 RX ORDER — LISINOPRIL 20 MG/1
20 TABLET ORAL DAILY
Qty: 90 TABLET | Refills: 3 | Status: SHIPPED | OUTPATIENT
Start: 2021-09-13 | End: 2022-09-12

## 2021-09-13 RX ORDER — ATORVASTATIN CALCIUM 40 MG/1
40 TABLET, FILM COATED ORAL DAILY
Qty: 90 TABLET | Refills: 3 | Status: SHIPPED | OUTPATIENT
Start: 2021-09-13 | End: 2022-02-04

## 2021-09-13 ASSESSMENT — PAIN SCALES - GENERAL: PAINLEVEL: NO PAIN (0)

## 2021-09-13 NOTE — LETTER

## 2021-09-13 NOTE — TELEPHONE ENCOUNTER
Patient would like to know what to do about all her medications prior to colonoscopy. Please call to discuss.    Thank you

## 2021-09-13 NOTE — TELEPHONE ENCOUNTER
Please call patient-she should stop her aspirin 1 week before her procedure.  If her dulaglutide injection is supposed to be the day of her procedure, she should take it the following day.  She should not take her glipizide, lisinopril, or Metformin the day of her procedure  Estephanie Mancia PA-C

## 2021-09-13 NOTE — PROGRESS NOTES
Assessment & Plan     Type 2 diabetes mellitus with hyperglycemia, unspecified whether long term insulin use (H)  Control improving though not to goal, will contact patient and recommend increasing Trulicity dosage to 4.5 mg weekly, have her continue the current dosage of the other medications encouraged continued improvements with diet and exercise in order to lose weight  - HEMOGLOBIN A1C; Future  - metFORMIN (GLUCOPHAGE) 1000 MG tablet; TAKE 1 TABLET BY MOUTH TWICE DAILY WITH MEALS  - atorvastatin (LIPITOR) 40 MG tablet; Take 1 tablet (40 mg) by mouth daily  - glipiZIDE (GLUCOTROL XL) 10 MG 24 hr tablet; Take 1 tablet (10 mg) by mouth 2 times daily  - Comprehensive metabolic panel (BMP + Alb, Alk Phos, ALT, AST, Total. Bili, TP); Future  - FOOT EXAM  - Comprehensive metabolic panel (BMP + Alb, Alk Phos, ALT, AST, Total. 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 Bili, TP); Future  - Comprehensive metabolic panel (BMP + Alb, Alk Phos, ALT, AST, Total. Bili, TP)  - HEMOGLOBIN A1C    Hypertension goal BP (blood pressure) < 140/90  At goal, continue current meds recheck 6 months  - atorvastatin (LIPITOR) 40 MG tablet; Take 1 tablet (40 mg) by mouth daily  - Comprehensive metabolic panel (BMP + Alb, Alk Phos, ALT, AST, Total. Bili, TP); Future  - Comprehensive metabolic panel (BMP + Alb, Alk Phos, ALT, AST, Total. Bili, TP)    Hyperlipidemia LDL goal <100  Await labs, likely continue current meds as she has done well in the past recheck 1 year  - atorvastatin (LIPITOR) 40 MG tablet; Take 1 tablet (40 mg) by mouth daily  - Comprehensive metabolic panel (BMP + Alb, Alk Phos, ALT, AST, Total. Bili, TP); Future  - Lipid panel reflex to  "direct LDL Fasting; Future  - Lipid panel reflex to direct LDL Fasting  - Comprehensive metabolic panel (BMP + Alb, Alk Phos, ALT, AST, Total. Bili, TP)    Benign essential hypertension  At goal, continue current meds  - lisinopril (ZESTRIL) 20 MG tablet; Take 1 tablet (20 mg) by mouth daily    Screen for colon cancer  Ordered  - Adult Gastro Ref - Procedure Only; Future    Need for prophylactic vaccination and inoculation against influenza  Patient would like to wait on getting her vaccinations, she is aware she is due for tetanus, shingles, and her flu shot               BMI:   Estimated body mass index is 36.57 kg/m  as calculated from the following:    Height as of 3/16/21: 1.642 m (5' 4.65\").    Weight as of this encounter: 98.6 kg (217 lb 6.4 oz).   Weight management plan: Discussed healthy diet and exercise guidelines        Return in about 6 months (around 3/13/2022) for diabetes.    Estephanie Mancia PA-C  [facility] JOAO Gonzales is a 66 year old who presents for the following health issues     History of Present Illness       Diabetes:   She presents for follow up of diabetes.  She is checking home blood glucose one time daily (100-145). She checks blood glucose before meals.  Blood glucose is never over 200 and never under 70. She is aware of hypoglycemia symptoms including shakiness (this is very very rare). She has no concerns regarding her diabetes at this time.  She is not experiencing numbness or burning in feet, excessive thirst, blurry vision, weight changes or redness, sores or blisters on feet.       Diabetes comment:  Walks up and down stairs a lot more in her new. Part time position at the [employer]. In March 2021, she was found to have some nonproliferative retinopathy. She is having monthly injections in her eye and visits with eye specialist every 3 months. She believes her diet is improved she eats cereal in the morning has a snack at about 1130 and then doesn't " eat again until dinner. She has been losing some weight. 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 She was walking a lot more but now is not walking because she is going up and down so many stairs at work    Hyperlipidemia:  She presents for follow up of hyperlipidemia.  She is taking medication to lower cholesterol. She is not having myalgia or other side effects to statin medications.    Hypertension: She presents for follow up of hypertension.  She does not check blood pressure  regularly outside of the clinic. Outpatient blood pressures have not been over 140/90. She does not follow a low salt diet.     Hypothyroidism:     Since last visit, patient describes the following symptoms::  None    She eats 0-1 servings of fruits and vegetables daily.She consumes 0 sweetened beverage(s) daily.She exercises with enough effort to increase her heart rate 9 or less minutes per day.  She exercises with enough effort to increase her heart rate 3 or less days per week.   She is taking medications regularly.         Review of Systems   CONSTITUTIONAL: Some mild weight loss as above  Eye-retinopathy following with specialty as above  ENT/MOUTH: NEGATIVE for ear, mouth and throat problems  RESP: NEGATIVE for significant cough or SOB  CV: NEGATIVE for chest pain, palpitations or peripheral edema  GI: NEGATIVE for nausea, abdominal pain, heartburn, or change in bowel habits  MUSCULOSKELETAL: NEGATIVE for significant arthralgias or myalgia  ENDOCRINE: NEGATIVE for temperature intolerance, skin/hair changes and feels that her thyroid is still under good control  PSYCHIATRIC: NEGATIVE for changes in mood or affect      Objective    /80   Pulse 64   Temp 98.1  F (36.7  C) (Temporal)   Resp 20   Wt 98.6 kg (217 lb 6.4 oz)   LMP  (LMP Unknown)   SpO2 97%   BMI 36.57 kg/m    Body mass index is 36.57 kg/m .  Physical Exam   GENERAL: healthy,  alert and no distress  NECK: no adenopathy, no asymmetry, masses, or scars and thyroid normal to palpation  RESP: lungs clear to auscultation - no rales, rhonchi or wheezes  CV: regular rate and rhythm, normal S1 S2, no S3 or S4, no murmur, click or rub, no peripheral edema and peripheral pulses strong  ABDOMEN: soft, nontender, no hepatosplenomegaly, no masses and bowel sounds normal  MS: no gross musculoskeletal defects noted, no edema  SKIN: no suspicious lesions or rashes  NEURO: Normal strength and tone, mentation intact and speech normal  PSYCH: mentation appears normal, affect normal/bright  Diabetic foot exam: normal DP and PT pulses, no trophic changes or ulcerative lesions, normal sensory exam and normal monofilament exam    Results for orders placed or performed in visit on 09/13/21   HEMOGLOBIN A1C     Status: Abnormal   Result Value Ref Range    Hemoglobin A1C 8.4 (H) 0.0 - 5.6 %

## 2021-09-13 NOTE — TELEPHONE ENCOUNTER
LM for patient to return call. See providers note below and relay instructions to patient.             Chari Park, CMA

## 2021-09-13 NOTE — TELEPHONE ENCOUNTER
Date of procedure: 10/21  Colonoscopy  Surgeon: Nathaniel  Prep:Miralax  Packet:Colonoscopy/EGD instructions mailed to patient's home address.   Date: 9/13/2021      Surgery Scheduler

## 2021-09-14 ENCOUNTER — TELEPHONE (OUTPATIENT)
Dept: FAMILY MEDICINE | Facility: OTHER | Age: 66
End: 2021-09-14

## 2021-09-14 NOTE — TELEPHONE ENCOUNTER
Please call patient and mail her a copy of her results--your cholesterol is looking good overall your triglycerides are still slightly elevated but improved from 9 months ago.  Your electrolytes, kidney function tests, and liver tests are normal.  Your bilirubin continues to be minimally elevated but this too is better than 9 months ago.  Your blood sugar is 134 at the time of the test.  Your hemoglobin A1C is down slightly to 8.4.  I would encourage you to continue to improve your diet and exercise in order to lose weight.  I also recommend increasing your dulaglutide to 4.5 mg weekly.  We should recheck your A1C again in 3 months.  If she would like to do the dosage increase, please have her let me know and I will send this prescription to her pharmacy.  Estephanie Mancia PA-C

## 2021-09-14 NOTE — LETTER
September 14, 2021      Janet SINHA Yo  [address]   [place] [address]        Dear ,    We are writing to inform you of your test results.    --your cholesterol is looking good overall your triglycerides are still slightly elevated but improved from 9 months ago.  Your electrolytes, kidney function tests, and liver tests are normal.  Your bilirubin continues to be minimally elevated but this too is better than 9 months ago.  Your blood sugar is 134 at the time of the test.  Your hemoglobin A1C is down slightly to 8.4.  I would encourage you to continue to improve your diet and exercise in order to lose weight.  I also recommend increasing your dulaglutide to 4.5 mg weekly.  We should recheck your A1C again in 3 months.  Let us know if you would like to increase you Dulaglutide.  Estephanie Mancia PA-C     Resulted Orders   Lipid panel reflex to direct LDL Fasting   Result Value Ref Range    Cholesterol 135 <200 mg/dL      Comment:      Age 0-19 years  Desirable: <170 mg/dL  Borderline high:  170-199 mg/dl  High:            >199 mg/dl    Age 20 years and older  Desirable: <200 mg/dL    Triglycerides 161 (H) <150 mg/dL      Comment:      0-9 years:  Normal:    Less than 75 mg/dL  Borderline high:  75-99 mg/dL  High:             Greater than or equal to 100 mg/dL    0-19 years:  Normal:    Less than 90 mg/dL  Borderline high:   mg/dL  High:             Greater than or equal to 130 mg/dL    20 years and older:  Normal:    Less than 150 mg/dL  Borderline high:  150-199 mg/dL  High:             200-499 mg/dL  Very high:   Greater than or equal to 500 mg/dL    Direct Measure HDL 52 >=50 mg/dL      Comment:      0-19 years:       Greater than or equal to 45 mg/dL   Low: Less than 40 mg/dL   Borderline low: 40-44 mg/dL     20 years and older:   Female: Greater than or equal to 50 mg/dL   Male:   Greater than or equal to 40 mg/dL         LDL Cholesterol Calculated 51 <=100 mg/dL      Comment:       Age 0-19 years:  Desirable: 0-110 mg/dL   Borderline high: 110-129 mg/dL   High: >= 130 mg/dL    Age 20 years and older:  Desirable: <100mg/dL  Above desirable: 100-129 mg/dL   Borderline high: 130-159 mg/dL   High: 160-189 mg/dL   Very high: >= 190 mg/dL    Non HDL Cholesterol 83 <130 mg/dL      Comment:      0-19 years:  Desirable:          Less than 120 mg/dL  Borderline high:   120-144 mg/dL  High:                   Greater than or equal to 145 mg/dL    20 years and older:  Desirable:          130 mg/dL  Above Desirable: 130-159 mg/dL  Borderline high:   160-189 mg/dL  High:               190-219 mg/dL  Very high:     Greater than or equal to 220 mg/dL    Patient Fasting > 8hrs? Yes    Comprehensive metabolic panel (BMP + Alb, Alk Phos, ALT, AST, Total. Bili, TP)   Result Value Ref Range    Sodium 143 133 - 144 mmol/L    Potassium 4.3 3.4 - 5.3 mmol/L    Chloride 111 (H) 94 - 109 mmol/L    Carbon Dioxide (CO2) 25 20 - 32 mmol/L    Anion Gap 7 3 - 14 mmol/L    Urea Nitrogen 16 7 - 30 mg/dL    Creatinine 0.97 0.52 - 1.04 mg/dL    Calcium 9.4 8.5 - 10.1 mg/dL    Glucose 134 (H) 70 - 99 mg/dL    Alkaline Phosphatase 86 40 - 150 U/L    AST 15 0 - 45 U/L    ALT 29 0 - 50 U/L    Protein Total 6.9 6.8 - 8.8 g/dL    Albumin 3.6 3.4 - 5.0 g/dL    Bilirubin Total 1.4 (H) 0.2 - 1.3 mg/dL    GFR Estimate 61 >60 mL/min/1.73m2      Comment:      As of July 11, 2021, eGFR is calculated by the CKD-EPI creatinine equation, without race adjustment. eGFR can be influenced by muscle mass, exercise, and diet. The reported eGFR is an estimation only and is only applicable if the renal function is stable.   HEMOGLOBIN A1C   Result Value Ref Range    Hemoglobin A1C 8.4 (H) 0.0 - 5.6 %      Comment:      Normal <5.7%   Prediabetes 5.7-6.4%    Diabetes 6.5% or higher     Note: Adopted from ADA consensus guidelines.       If you have any questions or concerns, please call the clinic at the number listed above.        Sincerely,

## 2021-09-15 NOTE — TELEPHONE ENCOUNTER
Notified patient of Estephanie's message below she will also await her colonoscopy prep which was mailed out.

## 2021-09-28 DIAGNOSIS — Z11.59 ENCOUNTER FOR SCREENING FOR OTHER VIRAL DISEASES: ICD-10-CM

## 2021-10-18 ENCOUNTER — LAB (OUTPATIENT)
Dept: LAB | Facility: OTHER | Age: 66
End: 2021-10-18
Payer: COMMERCIAL

## 2021-10-18 ENCOUNTER — TELEPHONE (OUTPATIENT)
Dept: FAMILY MEDICINE | Facility: CLINIC | Age: 66
End: 2021-10-18

## 2021-10-18 ENCOUNTER — OFFICE VISIT (OUTPATIENT)
Dept: FAMILY MEDICINE | Facility: CLINIC | Age: 66
End: 2021-10-18
Payer: COMMERCIAL

## 2021-10-18 VITALS
TEMPERATURE: 97.8 F | BODY MASS INDEX: 34.66 KG/M2 | HEIGHT: 65 IN | HEART RATE: 71 BPM | RESPIRATION RATE: 18 BRPM | SYSTOLIC BLOOD PRESSURE: 126 MMHG | WEIGHT: 208 LBS | DIASTOLIC BLOOD PRESSURE: 74 MMHG | OXYGEN SATURATION: 99 %

## 2021-10-18 DIAGNOSIS — E11.65 TYPE 2 DIABETES MELLITUS WITH HYPERGLYCEMIA, UNSPECIFIED WHETHER LONG TERM INSULIN USE (H): ICD-10-CM

## 2021-10-18 DIAGNOSIS — Z01.818 PREOP GENERAL PHYSICAL EXAM: Primary | ICD-10-CM

## 2021-10-18 DIAGNOSIS — E06.3 HASHIMOTO'S THYROIDITIS: ICD-10-CM

## 2021-10-18 DIAGNOSIS — Z23 NEED FOR PROPHYLACTIC VACCINATION AND INOCULATION AGAINST INFLUENZA: ICD-10-CM

## 2021-10-18 DIAGNOSIS — R94.31 NONSPECIFIC ABNORMAL ELECTROCARDIOGRAM (ECG) (EKG): ICD-10-CM

## 2021-10-18 DIAGNOSIS — Z12.11 SCREEN FOR COLON CANCER: ICD-10-CM

## 2021-10-18 DIAGNOSIS — I10 HYPERTENSION GOAL BP (BLOOD PRESSURE) < 140/90: ICD-10-CM

## 2021-10-18 DIAGNOSIS — Z11.59 ENCOUNTER FOR SCREENING FOR OTHER VIRAL DISEASES: ICD-10-CM

## 2021-10-18 LAB — HBA1C MFR BLD: 8.7 % (ref 0–5.6)

## 2021-10-18 PROCEDURE — 99214 OFFICE O/P EST MOD 30 MIN: CPT | Performed by: PHYSICIAN ASSISTANT

## 2021-10-18 PROCEDURE — U0003 INFECTIOUS AGENT DETECTION BY NUCLEIC ACID (DNA OR RNA); SEVERE ACUTE RESPIRATORY SYNDROME CORONAVIRUS 2 (SARS-COV-2) (CORONAVIRUS DISEASE [COVID-19]), AMPLIFIED PROBE TECHNIQUE, MAKING USE OF HIGH THROUGHPUT TECHNOLOGIES AS DESCRIBED BY CMS-2020-01-R: HCPCS

## 2021-10-18 PROCEDURE — 93000 ELECTROCARDIOGRAM COMPLETE: CPT | Performed by: PHYSICIAN ASSISTANT

## 2021-10-18 PROCEDURE — 36415 COLL VENOUS BLD VENIPUNCTURE: CPT | Performed by: PHYSICIAN ASSISTANT

## 2021-10-18 PROCEDURE — 83036 HEMOGLOBIN GLYCOSYLATED A1C: CPT | Performed by: PHYSICIAN ASSISTANT

## 2021-10-18 PROCEDURE — U0005 INFEC AGEN DETEC AMPLI PROBE: HCPCS

## 2021-10-18 ASSESSMENT — MIFFLIN-ST. JEOR: SCORE: 1478.74

## 2021-10-18 NOTE — TELEPHONE ENCOUNTER
Left Voicemail to reschedule pt. Please help patient reschedule the canceled appt below:       - Provider:  Abelardo           - Canceled appt details         Date: 10/18/2021         Time:  7:00am         Type of visit:  Pre-op      - Reason for change/cancellation: Provider not in clinic       Notes to consider when rescheduling:         Please close encounter once the patient has been rescheduled

## 2021-10-18 NOTE — TELEPHONE ENCOUNTER
Spoke with patient appointment scheduled      Next 5 appointments (look out 90 days)    Oct 18, 2021  3:00 PM  Pre-procedure Covid with ER COVID LAB  [facility] ([facility] ) [address] [address]  [phone]   Oct 18, 2021  4:40 PM  Pre-Op physical with Josie Tomas PA-C  [facility] ([facility] ) [address], [address]  [place] [address]  [phone]        Closing encounter  Malou Amaro RT (R)

## 2021-10-18 NOTE — PROGRESS NOTES
[facility] [place]  [address], [address]  [place] [address]  Phone: [phone]  Fax: [phone]  Primary Provider: Estephanie Mnacia  Pre-op Performing Provider: ELISE ADAMS      PREOPERATIVE EVALUATION:  Today's date: 10/18/2021    Janet Ram is a 66 year old female who presents for a preoperative evaluation.    Surgical Information:  Surgery/Procedure: Colonoscopy  Surgery Location: [facility]  Surgeon: Archana Armstrong DO  Surgery Date: 10/21/2021  Time of Surgery: 1:00 PM  Where patient plans to recover: At home with family  Fax number for surgical facility: Note does not need to be faxed, will be available electronically in Epic.    Type of Anesthesia Anticipated: MAC    Assessment & Plan     The proposed surgical procedure is considered LOW risk.    Preop general physical exam  Screen for colon cancer  Colonoscopy as scheduled.   - EKG 12-lead complete w/read - Clinics    Type 2 diabetes mellitus with hyperglycemia, unspecified whether long term insulin use (H)  Pt has been working with PCP to improve diabetes control.  Last visit w/PCP (Estephanie Mancia PA-C) 1 mo ago with dose increase of her dulaglutide.  a1c today 8.7% (last month 8.5%)  Pt to return to PCP for improved glycemic control   - Hemoglobin A1c; Future  - Hemoglobin A1c    Hypertension goal BP (blood pressure) < 140/90  BP controlled on current medications     Nonspecific abnormal electrocardiogram (ECG) (EKG)  NSR   Nonspecific findings- left axis and poor r-wave progression  Pt has no cardiac sx. Walks her dogs daily for up to an hour briskly without CV sx.   Low risk surgery.   Plan for echocardiogram and follow up with PCP. Ok to do after scope.   - Echocardiogram Complete; Future    Hashimoto's thyroiditis  Taking her thyroid medication daily. No sx.     Need for prophylactic vaccination and inoculation against influenza  She would like to get flu shot after her colonoscopy             Risks and Recommendations:  The patient has the following additional risks and recommendations for perioperative complications:    Medication Instructions:  Patient is to take all scheduled medications on the day of surgery EXCEPT for modifications listed below:   - aspirin: Discontinue aspirin 7-10 days prior to procedure to reduce bleeding risk. It should be resumed postoperatively.    - ACE/ARB: May be continued on the day of surgery.    - metformin: HOLD day of surgery.   - sulfonylurea (e.g. glyburide, glipizide): HOLD day of surgery   - GLP-1 Injectable (exenitide, liraglutide, semaglutide, dulaglutide, etc.): HOLD day of surgery     RECOMMENDATION:  APPROVAL GIVEN to proceed with proposed procedure, without further diagnostic evaluation.    Josie Tomas PA-C      Subjective     HPI related to upcoming procedure: Pt is having colonoscopy. She states she does not know why she needs a preop.         Preop Questions 10/18/2021   1. Have you ever had a heart attack or stroke? No   2. Have you ever had surgery on your heart or blood vessels, such as a stent placement, a coronary artery bypass, or surgery on an artery in your head, neck, heart, or legs? No   3. Do you have chest pain with activity? No   4. Do you have a history of  heart failure? No   5. Do you currently have a cold, bronchitis or symptoms of other infection? No   6. Do you have a cough, shortness of breath, or wheezing? No   7. Do you or anyone in your family have previous history of blood clots? No   8. Do you or does anyone in your family have a serious bleeding problem such as prolonged bleeding following surgeries or cuts? No   9. Have you ever had problems with anemia or been told to take iron pills? No   10. Have you had any abnormal blood loss such as black, tarry or bloody stools, or abnormal vaginal bleeding? No   11. Have you ever had a blood transfusion? No   12. Are you willing to have a blood transfusion if it is medically  needed before, during, or after your surgery? Yes   13. Have you or any of your relatives ever had problems with anesthesia? No   14. Do you have sleep apnea, excessive snoring or daytime drowsiness? No   15. Do you have any artifical heart valves or other implanted medical devices like a pacemaker, defibrillator, or continuous glucose monitor? No   16. Do you have artificial joints? No   17. Are you allergic to latex? No     Health Care Directive:  Patient does not have a Health Care Directive or Living Will: Discussed advance care planning with patient; information given to patient to review.    Preoperative Review of :   reviewed - no record of controlled substances prescribed.      Diabetes- saw PCP Estephanie Mancia PA-C Sept 2021. Dose of truclity was increased at that time.   Her a1c at that visit was 8.4%.   She is on metformin, glipizide, and Trulicity.  Am fasting- 147 this week  After meals- 130s.   No hypoglycemia.   Lisinopril   On statin  Normal GFR (>60)   Used to use insulin but quit that a 3 years ago with other med changes.    Hypothyroidism- euthyrox 50mcg for over a year    Pt denies any personal medical history of CKD, irregular heartbeat/a.fib, CAD/PVD, sleep apnea, Asthma/COPD, DVT/PE. Patient is a nonsmoker.     For exercise: was walking the dog a few times per day- can walk briskly; feels good with exercise. Limitations with exercise due to: nothing. Denies CV sxs with exercise including CP, SOB, palpitations, SILVERIO, presyncope.    Has had covid vaccines    Review of Systems  CONSTITUTIONAL: NEGATIVE for fever, chills, change in weight  INTEGUMENTARY/SKIN: NEGATIVE for worrisome rashes, moles or lesions  EYES: NEGATIVE for vision changes or irritation  ENT/MOUTH: NEGATIVE for ear, mouth and throat problems  RESP: NEGATIVE for significant cough or SOB  CV: NEGATIVE for chest pain, palpitations or peripheral edema  GI: NEGATIVE for nausea, abdominal pain, heartburn, or change in bowel  habits  : NEGATIVE for frequency, dysuria, or hematuria  MUSCULOSKELETAL: NEGATIVE for significant arthralgias or myalgia  NEURO: NEGATIVE for weakness, dizziness or paresthesias  ENDOCRINE: NEGATIVE for temperature intolerance, skin/hair changes  HEME: NEGATIVE for bleeding problems  PSYCHIATRIC: NEGATIVE for changes in mood or affect    Patient Active Problem List    Diagnosis Date Noted     Morbid obesity (H) 08/01/2018     Priority: Medium     Uncontrolled type 2 diabetes mellitus without complication, without long-term current use of insulin 08/01/2018     Priority: Medium     IMO Regulatory Load OCT 2020       Hashimoto's thyroiditis 08/01/2018     Priority: Medium     Uncontrolled type 2 diabetes mellitus without complication, with long-term current use of insulin 09/15/2017     Priority: Medium     IMO Regulatory Load OCT 2020       Lower abdominal pain 06/07/2016     Priority: Medium     Gallbladder calculus with possible cholecystitis/choledocolithiasis history 05/18/2016     Priority: Medium     Constipation, chronic 05/18/2016     Priority: Medium     Elevated bilirubin 05/18/2016     Priority: Medium     Obstipation 04/23/2016     Priority: Medium     Diverticulosis of large intestine 03/18/2015     Priority: Medium     Problem list name updated by automated process. Provider to review       Hypertension goal BP (blood pressure) < 140/90 02/21/2014     Priority: Medium     Advanced directives, counseling/discussion 06/22/2011     Priority: Medium     Parent voices understanding and acceptance of this advice and will call back if any further questions or concerns.         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 Provider to review        Past Medical History:   Diagnosis Date     Diabetic eye exam (H) 03/12/2015    [facility]     Hypertension      Type II or unspecified type diabetes mellitus without mention of complication, not stated as uncontrolled      Past Surgical History:   Procedure Laterality Date     COLONOSCOPY N/A 3/17/2015    Procedure: COLONOSCOPY;  Surgeon: Mike Elizondo MD;  Location:  GI     COLONOSCOPY N/A 3/18/2015    Procedure: COMBINED COLONOSCOPY, SINGLE OR MULTIPLE BIOPSY/POLYPECTOMY BY BIOPSY;  Surgeon: Mike Elizondo MD;  Location: [facility]     COLONOSCOPY N/A 8/8/2018    Procedure: COLONOSCOPY;  COLONOSCOPY;  Surgeon: Josse Bender DO;  Location:  GI     Current Outpatient Medications   Medication Sig Dispense Refill     Alcohol Swabs (ALCOHOL WIPES) PADS 1 each daily Use to test blood sugar 1 times dailyPlease give patient meter, test trips and lancets as covered by insurance. 100 each 3     ASPIRIN 81 MG OR TABS 1 TABLET DAILY       atorvastatin (LIPITOR) 40 MG tablet Take 1 tablet (40 mg) by mouth daily 90 tablet 3     B-D U/F 31G X 8 MM insulin pen needle USE ONE  ONCE DAILY OR  AS  DIRECTED 100 each 11     blood glucose (NO BRAND SPECIFIED) test strip Use to test blood sugar 1 times daily or as directed. Please give patient meter, test trips and lancets as covered by insurance. 100 each 3     blood glucose monitoring (ACCU-CHEK FASTCLIX) lancets USE 1 LANCET TO CHECK GLUCOSE ONCE DAILY OR  AS  DIRECTED or brand per insuance 100 each 3     blood glucose monitoring (NO BRAND SPECIFIED) meter device kit Use to test blood sugar 1 times daily or as directed. Please give patient meter, test trips and lancets as covered by insurance. 1 kit 0     Dulaglutide 3 MG/0.5ML SOPN Inject 3 mg Subcutaneous every 7 days 6 mL 5      "EUTHYROX 50 MCG tablet Take 1 tablet by mouth once daily 90 tablet 2     glipiZIDE (GLUCOTROL XL) 10 MG 24 hr tablet Take 1 tablet (10 mg) by mouth 2 times daily 180 tablet 1     lisinopril (ZESTRIL) 20 MG tablet Take 1 tablet (20 mg) by mouth daily 90 tablet 3     metFORMIN (GLUCOPHAGE) 1000 MG tablet TAKE 1 TABLET BY MOUTH TWICE DAILY WITH MEALS 180 tablet 1     ORDER FOR DME Equipment being ordered: needles for solostar flex pen 1 Box prn     polyethylene glycol (MIRALAX/GLYCOLAX) powder Take 17 g (1 capful) by mouth daily 850 g 0       Allergies   Allergen Reactions     Penicillins      Too young to remember reaction        Social History     Tobacco Use     Smoking status: Never Smoker     Smokeless tobacco: Never Used   Substance Use Topics     Alcohol use: Yes     Alcohol/week: 0.0 standard drinks     Comment: rare     Family History   Problem Relation Age of Onset     Diabetes Mother      C.A.D. Mother         in her 60's     Diabetes Father      C.A.D. Father         in his 50's     History   Drug Use No         Objective     /74   Pulse 71   Temp 97.8  F (36.6  C) (Temporal)   Resp 18   Ht 1.642 m (5' 4.65\")   Wt 94.3 kg (208 lb)   LMP  (LMP Unknown)   SpO2 99%   BMI 34.99 kg/m      Physical Exam    GENERAL APPEARANCE: healthy, alert and no distress     EYES: EOMI, PERRL     HENT: ear canals and TM's normal and nose and mouth without ulcers or lesions     NECK: no adenopathy, no asymmetry, masses, or scars and thyroid normal to palpation     RESP: lungs clear to auscultation - no rales, rhonchi or wheezes     CV: regular rates and rhythm, normal S1 S2, no S3 or S4 and no murmur, click or rub     ABDOMEN:  soft, nontender, no HSM or masses and bowel sounds normal     MS: extremities normal- no gross deformities noted, no evidence of inflammation in joints, FROM in all extremities.     SKIN: no suspicious lesions or rashes     NEURO: Normal strength and tone, sensory exam grossly normal, " mentation intact and speech normal     PSYCH: mentation appears normal. and affect normal/bright     LYMPHATICS: No cervical adenopathy    Recent Labs   Lab Test 09/13/21  0829 03/16/21  0802 11/30/20  0823 11/30/20  0823     --   --  143   POTASSIUM 4.3  --   --  4.1   CR 0.97  --   --  0.79   A1C 8.4* 8.8*   < > 9.4*    < > = values in this interval not displayed.        Diagnostics:  Recent Results (from the past 48 hour(s))   Asymptomatic COVID-19 Virus (Coronavirus) by PCR Nose    Collection Time: 10/18/21  2:52 PM    Specimen: Nose; Swab   Result Value Ref Range    SARS CoV2 PCR Negative Negative   Hemoglobin A1c    Collection Time: 10/18/21  5:28 PM   Result Value Ref Range    Hemoglobin A1C 8.7 (H) 0.0 - 5.6 %      No EKG required for low risk surgery (cataract, skin procedure, breast biopsy, etc).  EKG: Normal Sinus Rhythm, left axis, no LVH by voltage criteria, there are no prior tracings available    Revised Cardiac Risk Index (RCRI):  The patient has the following serious cardiovascular risks for perioperative complications:   - No serious cardiac risks = 0 points     RCRI Interpretation: 0 points: Class I (very low risk - 0.4% complication rate)           Signed Electronically by: Josie Tomas PA-C  Copy of this evaluation report is provided to requesting physician.

## 2021-10-18 NOTE — PATIENT INSTRUCTIONS
a1c is 8.7% - this is higher than last month actually.     EKG- will follow up with you tomorrow.         Preparing for Your Surgery  Getting started  A nurse will call you to review your health history and instructions. They will give you an arrival time based on your scheduled surgery time.  Please be ready to share the following:    Your doctor's clinic name and phone number    Your medical, surgical and anesthesia history    A list of allergies and sensitivities    A list of medicines, including herbal treatments and over-the-counter drugs    Whether the patient has a legal guardian (ask how to send us the papers in advance)  If you have a child who's having surgery, please ask for a copy of Preparing for Your Child's Surgery.    Preparing for surgery    Within 30 days of surgery: Have a pre-op exam (sometimes called an H&P, or History and Physical). This can be done at a clinic or pre-operative center.  ? If you're having a , you may not need this exam. Talk to your care team    At your pre-op exam, talk to your care team about all medicines you take. If you need to stop any medicines before surgery, ask when to start taking them again.  ? We do this for your safety. Many medicines can make you bleed too much during surgery. Some change how well surgery (anesthesia) drugs work.    Call your insurance company to let them know you're having surgery. (If you don't have insurance, call [phone].)    Call your clinic if there's any change in your health. This includes signs of a cold or flu (sore throat, runny nose, cough, rash, fever). It also includes a scrape or scratch near the surgery site.    If you have questions on the day of surgery, call your hospital or surgery center.  Eating and drinking guidelines  For your safety: Unless your surgeon tells you otherwise, follow the guidelines below.    Eat and drink as usual until 8 hours before surgery. After that, no food or milk.    Drink clear liquids  until 2 hours before surgery. These are liquids you can see through, like water, Gatorade and Propel Water. You may also have black coffee and tea (no cream or milk).    Nothing by mouth within 2 hours of surgery. This includes gum, candy and breath mints.    If you drink, stop drinking alcohol the night before surgery.    If your care team tells you to take medicine on the morning of surgery, it's okay to take it with a sip of water.  Preventing infection    Shower or bathe the night before and morning of your surgery. Follow the instructions your clinic gave you. (If no instructions, use regular soap.)    Don't shave or clip hair near your surgery site. We'll remove the hair if needed.    Don't smoke or vape the morning of surgery. You may chew nicotine gum up to 2 hours before surgery. A nicotine patch is okay.  ? Note: Some surgeries require you to completely quit smoking and nicotine. Check with your surgeon.    Your care team will make every effort to keep you safe from infection. We will:  ? Clean our hands often with soap and water (or an alcohol-based hand rub).  ? Clean the skin at your surgery site with a special soap that kills germs.  ? Give you a special gown to keep you warm. (Cold raises the risk of infection.)  ? Wear special hair covers, masks, gowns and gloves during surgery.  ? Give antibiotic medicine, if prescribed. Not all surgeries need antibiotics.  What to bring on the day of surgery    Photo ID and insurance card    Copy of your health care directive, if you have one    Glasses and hearing aides (bring cases)  ? You can't wear contacts during surgery    Inhaler and eye drops, if you use them (tell us about these when you arrive)    CPAP machine or breathing device, if you use them    A few personal items, if spending the night    If you have . . .  ? A pacemaker or ICD (cardiac defibrillator): Bring the ID card.  ? An implanted stimulator: Bring the remote control.  ? A legal guardian:  Bring a copy of the certified (court-stamped) guardianship papers.  Please remove any jewelry, including body piercings. Leave jewelry and other valuables at home.  If you're going home the day of surgery  Important: If you don't follow the rules below, we must cancel your surgery.     Arrange for someone to drive you home after surgery. You may not drive, take a taxi or take public transportation by yourself (unless you'll have local anesthesia only).    Arrange for a responsible adult to stay with you overnight. If you don't, we may keep you in the hospital overnight, and you may need to pay the costs yourself.  Questions?   If you have any questions for your care team, list them here: _________________________________________________________________________________________________________________________________________________________________________________________________________________________________________________________________________________________________________________________  For informational purposes only. Not to replace the advice of your health care provider. Copyright   2003, 2019 [facility]. All rights reserved. Clinically reviewed by Palma Soriano MD. [employer] [id number] - REV 4/20.

## 2021-10-19 LAB — SARS-COV-2 RNA RESP QL NAA+PROBE: NEGATIVE

## 2021-10-21 ENCOUNTER — HOSPITAL ENCOUNTER (OUTPATIENT)
Facility: CLINIC | Age: 66
Discharge: HOME | End: 2021-10-21
Attending: INTERNAL MEDICINE | Admitting: INTERNAL MEDICINE
Payer: COMMERCIAL

## 2021-10-21 ENCOUNTER — ANESTHESIA EVENT (OUTPATIENT)
Dept: GASTROENTEROLOGY | Facility: CLINIC | Age: 66
End: 2021-10-21
Payer: COMMERCIAL

## 2021-10-21 ENCOUNTER — ANESTHESIA (OUTPATIENT)
Dept: GASTROENTEROLOGY | Facility: CLINIC | Age: 66
End: 2021-10-21
Payer: COMMERCIAL

## 2021-10-21 VITALS
RESPIRATION RATE: 16 BRPM | OXYGEN SATURATION: 98 % | SYSTOLIC BLOOD PRESSURE: 135 MMHG | DIASTOLIC BLOOD PRESSURE: 80 MMHG

## 2021-10-21 DIAGNOSIS — Z86.0100 HISTORY OF COLONIC POLYPS: Primary | ICD-10-CM

## 2021-10-21 LAB
COLONOSCOPY: NORMAL
GLUCOSE BLDC GLUCOMTR-MCNC: 182 MG/DL (ref 70–99)

## 2021-10-21 PROCEDURE — G0104 CA SCREEN;FLEXI SIGMOIDSCOPE: HCPCS | Performed by: INTERNAL MEDICINE

## 2021-10-21 PROCEDURE — 250N000011 HC RX IP 250 OP 636: Performed by: NURSE ANESTHETIST, CERTIFIED REGISTERED

## 2021-10-21 PROCEDURE — 258N000003 HC RX IP 258 OP 636: Performed by: NURSE ANESTHETIST, CERTIFIED REGISTERED

## 2021-10-21 PROCEDURE — 45378 DIAGNOSTIC COLONOSCOPY: CPT | Performed by: INTERNAL MEDICINE

## 2021-10-21 PROCEDURE — 370N000017 HC ANESTHESIA TECHNICAL FEE, PER MIN: Performed by: INTERNAL MEDICINE

## 2021-10-21 PROCEDURE — 250N000009 HC RX 250: Performed by: NURSE ANESTHETIST, CERTIFIED REGISTERED

## 2021-10-21 PROCEDURE — 82962 GLUCOSE BLOOD TEST: CPT

## 2021-10-21 PROCEDURE — 250N000009 HC RX 250: Performed by: INTERNAL MEDICINE

## 2021-10-21 RX ORDER — SODIUM CHLORIDE, SODIUM LACTATE, POTASSIUM CHLORIDE, CALCIUM CHLORIDE 600; 310; 30; 20 MG/100ML; MG/100ML; MG/100ML; MG/100ML
INJECTION, SOLUTION INTRAVENOUS CONTINUOUS
Status: DISCONTINUED | OUTPATIENT
Start: 2021-10-21 | End: 2021-10-21 | Stop reason: HOSPADM

## 2021-10-21 RX ORDER — NALOXONE HYDROCHLORIDE 0.4 MG/ML
0.4 INJECTION, SOLUTION INTRAMUSCULAR; INTRAVENOUS; SUBCUTANEOUS
Status: DISCONTINUED | OUTPATIENT
Start: 2021-10-21 | End: 2021-10-21 | Stop reason: HOSPADM

## 2021-10-21 RX ORDER — ONDANSETRON 2 MG/ML
4 INJECTION INTRAMUSCULAR; INTRAVENOUS
Status: DISCONTINUED | OUTPATIENT
Start: 2021-10-21 | End: 2021-10-21 | Stop reason: HOSPADM

## 2021-10-21 RX ORDER — FLUMAZENIL 0.1 MG/ML
0.2 INJECTION, SOLUTION INTRAVENOUS
Status: DISCONTINUED | OUTPATIENT
Start: 2021-10-21 | End: 2021-10-21 | Stop reason: HOSPADM

## 2021-10-21 RX ORDER — PROPOFOL 10 MG/ML
INJECTION, EMULSION INTRAVENOUS CONTINUOUS PRN
Status: DISCONTINUED | OUTPATIENT
Start: 2021-10-21 | End: 2021-10-21

## 2021-10-21 RX ORDER — PROCHLORPERAZINE MALEATE 5 MG
5 TABLET ORAL EVERY 6 HOURS PRN
Status: DISCONTINUED | OUTPATIENT
Start: 2021-10-21 | End: 2021-10-21 | Stop reason: HOSPADM

## 2021-10-21 RX ORDER — LIDOCAINE HYDROCHLORIDE 20 MG/ML
INJECTION, SOLUTION INFILTRATION; PERINEURAL PRN
Status: DISCONTINUED | OUTPATIENT
Start: 2021-10-21 | End: 2021-10-21

## 2021-10-21 RX ORDER — NALOXONE HYDROCHLORIDE 0.4 MG/ML
0.2 INJECTION, SOLUTION INTRAMUSCULAR; INTRAVENOUS; SUBCUTANEOUS
Status: DISCONTINUED | OUTPATIENT
Start: 2021-10-21 | End: 2021-10-21 | Stop reason: HOSPADM

## 2021-10-21 RX ORDER — ONDANSETRON 4 MG/1
4 TABLET, ORALLY DISINTEGRATING ORAL EVERY 6 HOURS PRN
Status: DISCONTINUED | OUTPATIENT
Start: 2021-10-21 | End: 2021-10-21 | Stop reason: HOSPADM

## 2021-10-21 RX ORDER — PROPOFOL 10 MG/ML
INJECTION, EMULSION INTRAVENOUS PRN
Status: DISCONTINUED | OUTPATIENT
Start: 2021-10-21 | End: 2021-10-21

## 2021-10-21 RX ORDER — LIDOCAINE 40 MG/G
CREAM TOPICAL
Status: DISCONTINUED | OUTPATIENT
Start: 2021-10-21 | End: 2021-10-21 | Stop reason: HOSPADM

## 2021-10-21 RX ORDER — ONDANSETRON 2 MG/ML
4 INJECTION INTRAMUSCULAR; INTRAVENOUS EVERY 6 HOURS PRN
Status: DISCONTINUED | OUTPATIENT
Start: 2021-10-21 | End: 2021-10-21 | Stop reason: HOSPADM

## 2021-10-21 RX ADMIN — LIDOCAINE HYDROCHLORIDE 60 MG: 20 INJECTION, SOLUTION INFILTRATION; PERINEURAL at 13:05

## 2021-10-21 RX ADMIN — LIDOCAINE HYDROCHLORIDE 1 ML: 10 INJECTION, SOLUTION EPIDURAL; INFILTRATION; INTRACAUDAL; PERINEURAL at 12:42

## 2021-10-21 RX ADMIN — SODIUM CHLORIDE, POTASSIUM CHLORIDE, SODIUM LACTATE AND CALCIUM CHLORIDE: 600; 310; 30; 20 INJECTION, SOLUTION INTRAVENOUS at 12:42

## 2021-10-21 RX ADMIN — PROPOFOL 150 MCG/KG/MIN: 10 INJECTION, EMULSION INTRAVENOUS at 13:05

## 2021-10-21 RX ADMIN — PROPOFOL 60 MG: 10 INJECTION, EMULSION INTRAVENOUS at 13:05

## 2021-10-21 NOTE — ANESTHESIA PREPROCEDURE EVALUATION
Anesthesia Pre-Procedure Evaluation    Patient: Janet SINHA Srnsky   MRN: [id number] : 1955        Preoperative Diagnosis: Screen for colon cancer [Z12.11]    Procedure : Procedure(s):  COLONOSCOPY          Past Medical History:   Diagnosis Date     Diabetic eye exam (H) 2015    [facility]     Hypertension      Type II or unspecified type diabetes mellitus without mention of complication, not stated as uncontrolled       Past Surgical History:   Procedure Laterality Date     COLONOSCOPY N/A 3/17/2015    Procedure: COLONOSCOPY;  Surgeon: Mike Elizondo MD;  Location:  GI     COLONOSCOPY N/A 3/18/2015    Procedure: COMBINED COLONOSCOPY, SINGLE OR MULTIPLE BIOPSY/POLYPECTOMY BY BIOPSY;  Surgeon: Mike Elizondo MD;  Location:  GI     COLONOSCOPY N/A 2018    Procedure: COLONOSCOPY;  COLONOSCOPY;  Surgeon: Josse Bender DO;  Location:  GI      Allergies   Allergen Reactions     Penicillins      Too young to remember reaction      Social History     Tobacco Use     Smoking status: Never Smoker     Smokeless tobacco: Never Used   Substance Use Topics     Alcohol use: Yes     Alcohol/week: 0.0 standard drinks     Comment: rare      Wt Readings from Last 1 Encounters:   10/18/21 94.3 kg (208 lb)        Anesthesia Evaluation   Pt has had prior anesthetic. Type: MAC.    No history of anesthetic complications       ROS/MED HX  ENT/Pulmonary:  - neg pulmonary ROS     Neurologic:  - neg neurologic ROS     Cardiovascular:     (+) hypertension-----Previous cardiac testing   Echo: Date: Results:    Stress Test: Date: Results:    ECG Reviewed: Date: 10/18/2021 Results:  - NSR  Cath: Date: Results:      METS/Exercise Tolerance:     Hematologic:  - neg hematologic  ROS     Musculoskeletal:  - neg musculoskeletal ROS     GI/Hepatic:     (+) bowel prep,     Renal/Genitourinary:       Endo:     (+) type II DM, Last HgA1c: 8.7, date: 10/18/2021, Using insulin, - not using insulin pump.  thyroid problem, hypothyroidism, Obesity,     Psychiatric/Substance Use:  - neg psychiatric ROS     Infectious Disease:       Malignancy:       Other:               OUTSIDE LABS:  CBC:   Lab Results   Component Value Date    WBC 12.0 (H) 04/26/2016    WBC 15.2 (H) 04/24/2016    HGB 12.4 04/26/2016    HGB 11.8 04/24/2016    HCT 42.4 04/19/2018    HCT 36.7 04/26/2016     04/26/2016     04/24/2016     BMP:   Lab Results   Component Value Date     09/13/2021     11/30/2020    POTASSIUM 4.3 09/13/2021    POTASSIUM 4.1 11/30/2020    CHLORIDE 111 (H) 09/13/2021    CHLORIDE 110 (H) 11/30/2020    CO2 25 09/13/2021    CO2 23 11/30/2020    BUN 16 09/13/2021    BUN 13 11/30/2020    CR 0.97 09/13/2021    CR 0.79 11/30/2020     (H) 09/13/2021     (H) 11/30/2020     COAGS: No results found for: PTT, INR, FIBR  POC:   Lab Results   Component Value Date     (H) 08/08/2018     HEPATIC:   Lab Results   Component Value Date    ALBUMIN 3.6 09/13/2021    PROTTOTAL 6.9 09/13/2021    ALT 29 09/13/2021    AST 15 09/13/2021    ALKPHOS 86 09/13/2021    BILITOTAL 1.4 (H) 09/13/2021     OTHER:   Lab Results   Component Value Date    A1C 8.7 (H) 10/18/2021    MC 9.4 09/13/2021    LIPASE 77 04/23/2016    TSH 3.19 03/16/2021    T4 1.02 09/24/2018       Anesthesia Plan    ASA Status:  3   NPO Status:  NPO Appropriate    Anesthesia Type: MAC.     - Reason for MAC: immobility needed   Induction: Propofol, Intravenous.   Maintenance: TIVA.        Consents    Anesthesia Plan(s) and associated risks, benefits, and realistic alternatives discussed. Questions answered and patient/representative(s) expressed understanding.     - Discussed with:  Patient      - Extended Intubation/Ventilatory Support Discussed: No.      - Patient is DNR/DNI Status: No    Use of blood products discussed: No .     Postoperative Care            Comments:    The risks and benefits of anesthesia, and the alternatives where  applicable, have been discussed with the patient, and they wish to proceed.               Oswaldo Pugh APRN CRNA

## 2021-10-21 NOTE — ANESTHESIA CARE TRANSFER NOTE
Patient: Janet Ram    Procedure: Procedure(s):  ABORTED COLONOSCOPY       Diagnosis: Screen for colon cancer [Z12.11]  Diagnosis Additional Information: No value filed.    Anesthesia Type:   MAC     Note:    Oropharynx: oropharynx clear of all foreign objects and spontaneously breathing  Level of Consciousness: awake  Oxygen Supplementation: room air    Independent Airway: airway patency satisfactory and stable  Dentition: dentition unchanged  Vital Signs Stable: post-procedure vital signs reviewed and stable  Report to RN Given: handoff report given  Patient transferred to: Phase II    Handoff Report: Identifed the Patient, Identified the Reponsible Provider, Reviewed the pertinent medical history, Discussed the surgical course, Reviewed Intra-OP anesthesia mangement and issues during anesthesia, Set expectations for post-procedure period and Allowed opportunity for questions and acknowledgement of understanding      Vitals:  Vitals Value Taken Time   /76 10/21/21 1314   Temp     Pulse 63 10/21/21 1314   Resp     SpO2 93 % 10/21/21 1314   Vitals shown include unvalidated device data.    Electronically Signed By: PHILIP Westbrook CRNA  October 21, 2021  1:16 PM

## 2021-10-21 NOTE — ANESTHESIA POSTPROCEDURE EVALUATION
Patient: Janet Ram    Procedure: Procedure(s):  ABORTED COLONOSCOPY       Diagnosis:Screen for colon cancer [Z12.11]  Diagnosis Additional Information: No value filed.    Anesthesia Type:  MAC    Note:  Disposition: Outpatient   Postop Pain Control: Uneventful            Sign Out: Well controlled pain   PONV: No   Neuro/Psych: Uneventful            Sign Out: Acceptable/Baseline neuro status   Airway/Respiratory: Uneventful            Sign Out: Acceptable/Baseline resp. status   CV/Hemodynamics: Uneventful            Sign Out: Acceptable CV status   Other NRE: NONE   DID A NON-ROUTINE EVENT OCCUR? No    Event details/Postop Comments:  Pt was happy with anesthesia care.  No complications.  I will follow up with the pt if needed.           Last vitals:  Vitals Value Taken Time   /83 10/21/21 1330   Temp     Pulse 61 10/21/21 1330   Resp     SpO2 94 % 10/21/21 1341   Vitals shown include unvalidated device data.    Electronically Signed By: PHILIP Westbrook CRNA  October 21, 2021  1:42 PM

## 2021-10-21 NOTE — DISCHARGE INSTRUCTIONS
[facility]    Home Care Following Endoscopy          Activity:    You have just undergone an endoscopic procedure usually performed with conscious sedation.  Do not work or operate machinery (including a car) for at least 12 hours.      I encourage you to walk and attempt to pass this air as soon as possible.    Diet:    Return to the diet you were on before your procedure but eat lightly for the first 12-24 hours.    Drink plenty of water.    Resume any regular medications unless otherwise advised by your physician.  Please begin any new medication prescribed as a result of your procedure as directed by your physician.     If you had any biopsy or polyp removed please refrain from aspirin or aspirin products for 2 days.  If on Coumadin please restart as instructed by your physician.   Pain:    You may take Tylenol as needed for pain.  Expected Recovery:    You can expect some mild abdominal fullness and/or discomfort due to the air used to inflate your intestinal tract. It is also normal to have a mild sore throat after upper endoscopy.    Call Your Physician if You Have:    After Colonoscopy:  o Worsening persisting abdominal pain which is worse with activity.  o Fevers (>101 degrees F), chills or shakes.  o Passage of continued blood with bowel movements.   Any questions or concerns about your recovery, please call [phone] or after hours [phone] Nurse Advice Line.    Follow-up Care:    You should receive a call or letter with your results within 1 week. Please call if you have not received a notification of your results.  If asked to return to clinic please make an appointment 1 week after your procedure.  Call [phone].

## 2021-10-26 ENCOUNTER — TELEPHONE (OUTPATIENT)
Dept: GASTROENTEROLOGY | Facility: CLINIC | Age: 66
End: 2021-10-26

## 2021-10-26 DIAGNOSIS — Z11.59 ENCOUNTER FOR SCREENING FOR OTHER VIRAL DISEASES: ICD-10-CM

## 2021-10-26 NOTE — TELEPHONE ENCOUNTER
Screening Questions  1. Are you active on mychart? No - [facility] MAIL     2. What insurance is in the chart? BC[employer]     2.  Ordering/Referring Provider: Archana Armstrong DO    3. BMI 35.7    4. Do you have any Lung issues?  N If yes continue:   Do you use daily home oxygen? N   Do you have Pulmonary Hypertension? N   Do you have SEVERE asthma? **    5. Have you had a heart, lung, or liver transplant? N    6. Are you currently on dialysis or have chronic kidney disease? N    7. Have you had a stroke or Transient ischemic attack (TIA) within 6 months? N    8. In the past 6 months, have you had any heart related issues including cardiomyopathy or heart attack? N      If yes, did it require cardiac stenting or other implantable device?N      9. Do you have any implantable devices in your body (pacemaker, defib, LVAD)? N    10. Do you take nitroglycerin? If yes, how often? N    11. Are you currently taking any blood thinners?N    12. Are you a diabetic? Y - NOT INSULIN DEPENDENT     13. (Females) Are you currently pregnant? N  If yes, how many weeks?      15. Are you taking any prescription pain medications on a routine schedule? N If yes, MAC sedation.    16. Do you have any chemical dependencies such as alcohol, street drugs, or methadone? NIf yes, MAC sedation.    17. Do you have any history of post-traumatic stress syndrome, severe anxiety or history of psychosis? N    18. Do you transfer independently? Y    19.  Do you have any issues with constipation? N    20. Preferred Pharmacy for Pre Prescription [facility]/ On Chart     Scheduling Details    Which Colonoscopy Prep was Sent?: SALLY   Procedure Scheduled: Colonoscopy   Provider/Surgeon: Luis  Date of Procedure: 11/15/2021  Location:  GI   Caller (Please ask for phone number if not scheduled by patient): Janet Ram      Sedation Type: MAC  Conscious Sedation- Needs  for 6 hours after the procedure  MAC/General-Needs  for 24 hours after  procedure    Pre-op Required at [facility] [place], [place] and OR for MAC sedation:   (if yes advise patient they will need a pre-op prior to procedure)      Is patient on blood thinners? -N (If yes- inform patient to follow up with PCP or provider for follow up instructions)     Informed patient they will need an adult  Y  Cannot take any type of public or medical transportation alone    Pre-Procedure [facility] or Externally: Y    Confirmed Nurse will call to complete assessment Y    Additional comments: N

## 2021-11-12 ENCOUNTER — LAB (OUTPATIENT)
Dept: LAB | Facility: OTHER | Age: 66
End: 2021-11-12
Payer: COMMERCIAL

## 2021-11-12 DIAGNOSIS — Z11.59 ENCOUNTER FOR SCREENING FOR OTHER VIRAL DISEASES: ICD-10-CM

## 2021-11-12 PROCEDURE — U0003 INFECTIOUS AGENT DETECTION BY NUCLEIC ACID (DNA OR RNA); SEVERE ACUTE RESPIRATORY SYNDROME CORONAVIRUS 2 (SARS-COV-2) (CORONAVIRUS DISEASE [COVID-19]), AMPLIFIED PROBE TECHNIQUE, MAKING USE OF HIGH THROUGHPUT TECHNOLOGIES AS DESCRIBED BY CMS-2020-01-R: HCPCS

## 2021-11-12 PROCEDURE — U0005 INFEC AGEN DETEC AMPLI PROBE: HCPCS

## 2021-11-13 LAB — SARS-COV-2 RNA RESP QL NAA+PROBE: NEGATIVE

## 2021-11-14 ENCOUNTER — ANESTHESIA EVENT (OUTPATIENT)
Dept: GASTROENTEROLOGY | Facility: CLINIC | Age: 66
End: 2021-11-14
Payer: COMMERCIAL

## 2021-11-15 ENCOUNTER — HOSPITAL ENCOUNTER (OUTPATIENT)
Facility: CLINIC | Age: 66
Discharge: HOME | End: 2021-11-15
Attending: FAMILY MEDICINE | Admitting: FAMILY MEDICINE
Payer: COMMERCIAL

## 2021-11-15 ENCOUNTER — ANESTHESIA (OUTPATIENT)
Dept: GASTROENTEROLOGY | Facility: CLINIC | Age: 66
End: 2021-11-15
Payer: COMMERCIAL

## 2021-11-15 VITALS
SYSTOLIC BLOOD PRESSURE: 167 MMHG | OXYGEN SATURATION: 100 % | TEMPERATURE: 97.6 F | DIASTOLIC BLOOD PRESSURE: 78 MMHG | HEART RATE: 62 BPM | RESPIRATION RATE: 16 BRPM

## 2021-11-15 DIAGNOSIS — Z12.11 ENCOUNTER FOR SCREENING COLONOSCOPY: Primary | ICD-10-CM

## 2021-11-15 LAB
COLONOSCOPY: NORMAL
GLUCOSE BLDC GLUCOMTR-MCNC: 153 MG/DL (ref 70–99)

## 2021-11-15 PROCEDURE — 250N000009 HC RX 250: Performed by: NURSE ANESTHETIST, CERTIFIED REGISTERED

## 2021-11-15 PROCEDURE — 82962 GLUCOSE BLOOD TEST: CPT | Mod: GZ

## 2021-11-15 PROCEDURE — 258N000003 HC RX IP 258 OP 636: Performed by: NURSE ANESTHETIST, CERTIFIED REGISTERED

## 2021-11-15 PROCEDURE — 45378 DIAGNOSTIC COLONOSCOPY: CPT | Performed by: FAMILY MEDICINE

## 2021-11-15 PROCEDURE — 250N000011 HC RX IP 250 OP 636: Performed by: NURSE ANESTHETIST, CERTIFIED REGISTERED

## 2021-11-15 PROCEDURE — G0104 CA SCREEN;FLEXI SIGMOIDSCOPE: HCPCS | Performed by: FAMILY MEDICINE

## 2021-11-15 PROCEDURE — G0105 COLORECTAL SCRN; HI RISK IND: HCPCS | Mod: 74 | Performed by: FAMILY MEDICINE

## 2021-11-15 PROCEDURE — 370N000017 HC ANESTHESIA TECHNICAL FEE, PER MIN: Performed by: FAMILY MEDICINE

## 2021-11-15 RX ORDER — NALOXONE HYDROCHLORIDE 0.4 MG/ML
0.4 INJECTION, SOLUTION INTRAMUSCULAR; INTRAVENOUS; SUBCUTANEOUS
Status: DISCONTINUED | OUTPATIENT
Start: 2021-11-15 | End: 2021-11-15 | Stop reason: HOSPADM

## 2021-11-15 RX ORDER — LIDOCAINE 40 MG/G
CREAM TOPICAL
Status: CANCELLED | OUTPATIENT
Start: 2021-11-15

## 2021-11-15 RX ORDER — SODIUM CHLORIDE, SODIUM LACTATE, POTASSIUM CHLORIDE, CALCIUM CHLORIDE 600; 310; 30; 20 MG/100ML; MG/100ML; MG/100ML; MG/100ML
INJECTION, SOLUTION INTRAVENOUS CONTINUOUS PRN
Status: DISCONTINUED | OUTPATIENT
Start: 2021-11-15 | End: 2021-11-15

## 2021-11-15 RX ORDER — LIDOCAINE 40 MG/G
CREAM TOPICAL
Status: DISCONTINUED | OUTPATIENT
Start: 2021-11-15 | End: 2021-11-15 | Stop reason: HOSPADM

## 2021-11-15 RX ORDER — ONDANSETRON 4 MG/1
4 TABLET, ORALLY DISINTEGRATING ORAL EVERY 6 HOURS PRN
Status: DISCONTINUED | OUTPATIENT
Start: 2021-11-15 | End: 2021-11-15 | Stop reason: HOSPADM

## 2021-11-15 RX ORDER — PROCHLORPERAZINE MALEATE 5 MG
5 TABLET ORAL EVERY 6 HOURS PRN
Status: DISCONTINUED | OUTPATIENT
Start: 2021-11-15 | End: 2021-11-15 | Stop reason: HOSPADM

## 2021-11-15 RX ORDER — FLUMAZENIL 0.1 MG/ML
0.2 INJECTION, SOLUTION INTRAVENOUS
Status: DISCONTINUED | OUTPATIENT
Start: 2021-11-15 | End: 2021-11-15 | Stop reason: HOSPADM

## 2021-11-15 RX ORDER — ONDANSETRON 2 MG/ML
4 INJECTION INTRAMUSCULAR; INTRAVENOUS
Status: DISCONTINUED | OUTPATIENT
Start: 2021-11-15 | End: 2021-11-15 | Stop reason: HOSPADM

## 2021-11-15 RX ORDER — PROPOFOL 10 MG/ML
INJECTION, EMULSION INTRAVENOUS CONTINUOUS PRN
Status: DISCONTINUED | OUTPATIENT
Start: 2021-11-15 | End: 2021-11-15

## 2021-11-15 RX ORDER — ONDANSETRON 2 MG/ML
4 INJECTION INTRAMUSCULAR; INTRAVENOUS
Status: CANCELLED | OUTPATIENT
Start: 2021-11-15

## 2021-11-15 RX ORDER — PROPOFOL 10 MG/ML
INJECTION, EMULSION INTRAVENOUS PRN
Status: DISCONTINUED | OUTPATIENT
Start: 2021-11-15 | End: 2021-11-15

## 2021-11-15 RX ORDER — ONDANSETRON 2 MG/ML
4 INJECTION INTRAMUSCULAR; INTRAVENOUS EVERY 6 HOURS PRN
Status: DISCONTINUED | OUTPATIENT
Start: 2021-11-15 | End: 2021-11-15 | Stop reason: HOSPADM

## 2021-11-15 RX ORDER — NALOXONE HYDROCHLORIDE 0.4 MG/ML
0.2 INJECTION, SOLUTION INTRAMUSCULAR; INTRAVENOUS; SUBCUTANEOUS
Status: DISCONTINUED | OUTPATIENT
Start: 2021-11-15 | End: 2021-11-15 | Stop reason: HOSPADM

## 2021-11-15 RX ORDER — LIDOCAINE HYDROCHLORIDE 20 MG/ML
INJECTION, SOLUTION INFILTRATION; PERINEURAL PRN
Status: DISCONTINUED | OUTPATIENT
Start: 2021-11-15 | End: 2021-11-15

## 2021-11-15 RX ADMIN — LIDOCAINE HYDROCHLORIDE 50 MG: 20 INJECTION, SOLUTION INFILTRATION; PERINEURAL at 11:46

## 2021-11-15 RX ADMIN — PROPOFOL 50 MG: 10 INJECTION, EMULSION INTRAVENOUS at 11:46

## 2021-11-15 RX ADMIN — PROPOFOL 40 MG: 10 INJECTION, EMULSION INTRAVENOUS at 11:47

## 2021-11-15 RX ADMIN — SODIUM CHLORIDE, POTASSIUM CHLORIDE, SODIUM LACTATE AND CALCIUM CHLORIDE: 600; 310; 30; 20 INJECTION, SOLUTION INTRAVENOUS at 11:29

## 2021-11-15 RX ADMIN — PROPOFOL 170 MCG/KG/MIN: 10 INJECTION, EMULSION INTRAVENOUS at 11:47

## 2021-11-15 NOTE — ANESTHESIA PREPROCEDURE EVALUATION
Anesthesia Pre-Procedure Evaluation    Patient: Janet SINHA Srnsky   MRN: [id number] : 1955        Preoperative Diagnosis: History of colonic polyps [Z86.010]    Procedure : Procedure(s):  COLONOSCOPY          Past Medical History:   Diagnosis Date     Diabetic eye exam (H) 2015    [facility]     Hypertension      Type II or unspecified type diabetes mellitus without mention of complication, not stated as uncontrolled       Past Surgical History:   Procedure Laterality Date     COLONOSCOPY N/A 3/17/2015    Procedure: COLONOSCOPY;  Surgeon: Mike Elizondo MD;  Location:  GI     COLONOSCOPY N/A 3/18/2015    Procedure: COMBINED COLONOSCOPY, SINGLE OR MULTIPLE BIOPSY/POLYPECTOMY BY BIOPSY;  Surgeon: Mike Elizondo MD;  Location: [facility]     COLONOSCOPY N/A 2018    Procedure: COLONOSCOPY;  COLONOSCOPY;  Surgeon: Josse Bender DO;  Location:  GI     COLONOSCOPY N/A 10/21/2021    Procedure: ABORTED COLONOSCOPY;  Surgeon: Archana Armstrong DO;  Location:  GI      Allergies   Allergen Reactions     Penicillins      Too young to remember reaction      Social History     Tobacco Use     Smoking status: Never Smoker     Smokeless tobacco: Never Used   Substance Use Topics     Alcohol use: Yes     Alcohol/week: 0.0 standard drinks     Comment: rare      Wt Readings from Last 1 Encounters:   10/18/21 94.3 kg (208 lb)        Anesthesia Evaluation   Pt has had prior anesthetic. Type: MAC.    No history of anesthetic complications       ROS/MED HX  ENT/Pulmonary:  - neg pulmonary ROS     Neurologic:  - neg neurologic ROS     Cardiovascular:     (+) Dyslipidemia hypertension-----Previous cardiac testing   Echo: Date: Results:    Stress Test: Date: Results:    ECG Reviewed: Date: 10/18/2021 Results:  - NSR  Cath: Date: Results:      METS/Exercise Tolerance:     Hematologic:  - neg hematologic  ROS     Musculoskeletal:  - neg musculoskeletal ROS     GI/Hepatic:     (+) bowel prep,      Renal/Genitourinary:  - neg Renal ROS     Endo:     (+) type II DM, Last HgA1c: 8.7, date: 10/18/2021, Using insulin, - not using insulin pump. thyroid problem, hypothyroidism, Obesity,     Psychiatric/Substance Use:  - neg psychiatric ROS     Infectious Disease:       Malignancy:       Other:  - neg other ROS          Physical Exam    Airway  airway exam normal      Mallampati: II   TM distance: > 3 FB   Neck ROM: full   Mouth opening: > 3 cm    Respiratory Devices and Support         Dental       (+) caps      Cardiovascular   cardiovascular exam normal       Rhythm and rate: regular and normal     Pulmonary   pulmonary exam normal        breath sounds clear to auscultation           OUTSIDE LABS:  CBC:   Lab Results   Component Value Date    WBC 12.0 (H) 04/26/2016    WBC 15.2 (H) 04/24/2016    HGB 12.4 04/26/2016    HGB 11.8 04/24/2016    HCT 42.4 04/19/2018    HCT 36.7 04/26/2016     04/26/2016     04/24/2016     BMP:   Lab Results   Component Value Date     09/13/2021     11/30/2020    POTASSIUM 4.3 09/13/2021    POTASSIUM 4.1 11/30/2020    CHLORIDE 111 (H) 09/13/2021    CHLORIDE 110 (H) 11/30/2020    CO2 25 09/13/2021    CO2 23 11/30/2020    BUN 16 09/13/2021    BUN 13 11/30/2020    CR 0.97 09/13/2021    CR 0.79 11/30/2020     (H) 10/21/2021     (H) 09/13/2021     COAGS: No results found for: PTT, INR, FIBR  POC:   Lab Results   Component Value Date     (H) 08/08/2018     HEPATIC:   Lab Results   Component Value Date    ALBUMIN 3.6 09/13/2021    PROTTOTAL 6.9 09/13/2021    ALT 29 09/13/2021    AST 15 09/13/2021    ALKPHOS 86 09/13/2021    BILITOTAL 1.4 (H) 09/13/2021     OTHER:   Lab Results   Component Value Date    A1C 8.7 (H) 10/18/2021    MC 9.4 09/13/2021    LIPASE 77 04/23/2016    TSH 3.19 03/16/2021    T4 1.02 09/24/2018       Anesthesia Plan    ASA Status:  2   NPO Status:  NPO Appropriate    Anesthesia Type: MAC.      Maintenance: TIVA.         Consents    Anesthesia Plan(s) and associated risks, benefits, and realistic alternatives discussed. Questions answered and patient/representative(s) expressed understanding.     - Discussed with:  Patient    Use of blood products discussed: No .     Postoperative Care            Comments:    The risks and benefits of anesthesia, and the alternatives where applicable, have been discussed with the patient, and they wish to proceed.            PHILIP Zhang CRNA

## 2021-11-15 NOTE — H&P
"Pre-Endoscopy History and Physical     Janet Ram MRN# [id number]   YOB: 1955 Age: 66 year old     Date of Procedure: 11/15/2021  Primary care provider: Estephanie Mancia  Type of Endoscopy: colonoscopy  Type of Anesthesia Anticipated: MAC     HPI:    Janet is a 66 year old female who was referred to me for colonoscopy.    Janet is feeling well today.      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, with long-term current use of insulin, macular edema presence unspecified (H)     Advanced directives, counseling/discussion     Hypertension goal BP (blood pressure) < 140/90     Diverticulosis of large intestine     Obstipation     Gallbladder calculus with possible cholecystitis/choledocolithiasis history     Constipation, chronic     Elevated bilirubin     Lower abdominal pain     Uncontrolled type 2 diabetes mellitus without complication, with long-term current use of insulin     Morbid obesity (H)     Uncontrolled type 2 diabetes mellitus without complication, without long-term current use of insulin     Hashimoto's thyroiditis            PHYSICAL EXAM:   BP (!) 166/87 (BP Location: Left arm)   Pulse 73   Temp 97.6  F (36.4  C) (Oral)   Resp 16   LMP  (LMP Unknown)   SpO2 100%    Estimated body mass index is 34.99 kg/m  as calculated from the following:    Height as of 10/18/21: 1.642 m (5' 4.65\").    Weight as of 10/18/21: 94.3 kg (208 lb).    GENERAL APPEARANCE: alert and no distress. Appears to comprehend the procedure and indication  RESP: lungs unlabored  CV: regular  ABD: soft, nt/nd    DIAGNOSTICS:      COVID-19 PCR Results    COVID-19 PCR Results 10/18/21 11/12/21   SARS CoV2 PCR Negative Negative      Comments are available for some flowsheets but are not being displayed.         COVID-19 Antibody Results, Testing for Immunity "    COVID-19 Antibody Results, Testing for Immunity   No data to display.              IMPRESSION   ASA Class 2 - Mild systemic disease        PLAN:     Plan for colonoscopy. No medical contraindications to proceed, or further work up needed. The risks and benefits of the procedure and the sedation options and risks were discussed with the patient. These include infection, bleeding, and small risk of colon perforation (1/1000 to 1/03127 depending on patient characteristics and type of procedure). Janet was also explained to alternatives for colo-rectal screening. All questions were answered and informed consent was obtained.      The above has been forwarded to the consulting provider.      Signed Electronically by: Mike Smith MD  November 15, 2021

## 2021-11-15 NOTE — ANESTHESIA CARE TRANSFER NOTE
Patient: Janet Ram    Procedure: Procedure(s):  colonoscopy aborted due to poor prep       Diagnosis: History of colonic polyps [Z86.010]  Diagnosis Additional Information: No value filed.    Anesthesia Type:   MAC     Note:    Oropharynx: oropharynx clear of all foreign objects and spontaneously breathing  Level of Consciousness: drowsy  Oxygen Supplementation: face mask    Independent Airway: airway patency satisfactory and stable  Dentition: dentition unchanged  Vital Signs Stable: post-procedure vital signs reviewed and stable  Report to RN Given: handoff report given  Patient transferred to: Phase II    Handoff Report: Identifed the Patient, Identified the Reponsible Provider, Reviewed the pertinent medical history, Discussed the surgical course, Reviewed Intra-OP anesthesia mangement and issues during anesthesia, Set expectations for post-procedure period and Allowed opportunity for questions and acknowledgement of understanding      Vitals:  Vitals Value Taken Time   /71 11/15/21 1206   Temp     Pulse 64 11/15/21 1206   Resp     SpO2 95 % 11/15/21 1208   Vitals shown include unvalidated device data.    Electronically Signed By: PHILIP Martinez CRNA  November 15, 2021  12:08 PM

## 2021-11-15 NOTE — DISCHARGE INSTRUCTIONS
[facility]    Home Care Following Endoscopy          Activity:    You have just undergone an endoscopic procedure usually performed with conscious sedation.  Do not work or operate machinery (including a car) for at least 12 hours.      I encourage you to walk and attempt to pass this air as soon as possible.    Diet:    Remain on a clear liquid diet.  Do not eat any food.     Follow the diet instructions on your colonoscopy prep instructions.     Drink plenty of water.    Resume any regular medications unless otherwise advised by your physician.     Expected Recovery:    You can expect some mild abdominal fullness and/or discomfort due to the air used to inflate your intestinal tract.     Call Your Physician if You Have:    After Colonoscopy:  o Worsening persisting abdominal pain which is worse with activity.  o Fevers (>101 degrees F), chills or shakes.  o Passage of continued blood with bowel movements.   Any questions or concerns about your recovery, please call [phone] or after hours [phone] Nurse Advice Line.    [facility]:30 [facility] for a repeat colonoscopy with . Follow the colonoscopy prep instructions given to you.      Follow-up Care:    You should receive a call or letter with your results within 1 week. Please call if you have not received a notification of your results.  If asked to return to clinic please make an appointment 1 week after your procedure.  Call [phone].

## 2021-11-16 ENCOUNTER — ANESTHESIA (OUTPATIENT)
Dept: GASTROENTEROLOGY | Facility: CLINIC | Age: 66
End: 2021-11-16
Payer: COMMERCIAL

## 2021-11-16 ENCOUNTER — HOSPITAL ENCOUNTER (OUTPATIENT)
Facility: CLINIC | Age: 66
Discharge: HOME | End: 2021-11-16
Attending: SURGERY | Admitting: SURGERY
Payer: COMMERCIAL

## 2021-11-16 ENCOUNTER — ANESTHESIA EVENT (OUTPATIENT)
Dept: GASTROENTEROLOGY | Facility: CLINIC | Age: 66
End: 2021-11-16
Payer: COMMERCIAL

## 2021-11-16 VITALS
OXYGEN SATURATION: 99 % | SYSTOLIC BLOOD PRESSURE: 132 MMHG | TEMPERATURE: 97.6 F | DIASTOLIC BLOOD PRESSURE: 65 MMHG | HEART RATE: 60 BPM | RESPIRATION RATE: 16 BRPM

## 2021-11-16 DIAGNOSIS — Z12.11 ENCOUNTER FOR SCREENING COLONOSCOPY: ICD-10-CM

## 2021-11-16 LAB
COLONOSCOPY: NORMAL
GLUCOSE BLDC GLUCOMTR-MCNC: 194 MG/DL (ref 70–99)

## 2021-11-16 PROCEDURE — 250N000011 HC RX IP 250 OP 636: Performed by: NURSE ANESTHETIST, CERTIFIED REGISTERED

## 2021-11-16 PROCEDURE — 45380 COLONOSCOPY AND BIOPSY: CPT | Performed by: SURGERY

## 2021-11-16 PROCEDURE — 82962 GLUCOSE BLOOD TEST: CPT

## 2021-11-16 PROCEDURE — 45385 COLONOSCOPY W/LESION REMOVAL: CPT | Mod: PT | Performed by: SURGERY

## 2021-11-16 PROCEDURE — 250N000009 HC RX 250: Performed by: NURSE ANESTHETIST, CERTIFIED REGISTERED

## 2021-11-16 PROCEDURE — 370N000017 HC ANESTHESIA TECHNICAL FEE, PER MIN: Performed by: SURGERY

## 2021-11-16 PROCEDURE — 88305 TISSUE EXAM BY PATHOLOGIST: CPT | Mod: TC | Performed by: SURGERY

## 2021-11-16 PROCEDURE — 258N000003 HC RX IP 258 OP 636: Performed by: NURSE ANESTHETIST, CERTIFIED REGISTERED

## 2021-11-16 RX ORDER — LIDOCAINE HYDROCHLORIDE 20 MG/ML
INJECTION, SOLUTION INFILTRATION; PERINEURAL PRN
Status: DISCONTINUED | OUTPATIENT
Start: 2021-11-16 | End: 2021-11-16

## 2021-11-16 RX ORDER — PROPOFOL 10 MG/ML
INJECTION, EMULSION INTRAVENOUS CONTINUOUS PRN
Status: DISCONTINUED | OUTPATIENT
Start: 2021-11-16 | End: 2021-11-16

## 2021-11-16 RX ORDER — SODIUM CHLORIDE, SODIUM LACTATE, POTASSIUM CHLORIDE, CALCIUM CHLORIDE 600; 310; 30; 20 MG/100ML; MG/100ML; MG/100ML; MG/100ML
INJECTION, SOLUTION INTRAVENOUS CONTINUOUS PRN
Status: DISCONTINUED | OUTPATIENT
Start: 2021-11-16 | End: 2021-11-16

## 2021-11-16 RX ORDER — PROCHLORPERAZINE MALEATE 5 MG
5 TABLET ORAL EVERY 6 HOURS PRN
Status: CANCELLED | OUTPATIENT
Start: 2021-11-16

## 2021-11-16 RX ORDER — ONDANSETRON 4 MG/1
4 TABLET, ORALLY DISINTEGRATING ORAL EVERY 6 HOURS PRN
Status: CANCELLED | OUTPATIENT
Start: 2021-11-16

## 2021-11-16 RX ORDER — NALOXONE HYDROCHLORIDE 0.4 MG/ML
0.4 INJECTION, SOLUTION INTRAMUSCULAR; INTRAVENOUS; SUBCUTANEOUS
Status: CANCELLED | OUTPATIENT
Start: 2021-11-16

## 2021-11-16 RX ORDER — NALOXONE HYDROCHLORIDE 0.4 MG/ML
0.2 INJECTION, SOLUTION INTRAMUSCULAR; INTRAVENOUS; SUBCUTANEOUS
Status: CANCELLED | OUTPATIENT
Start: 2021-11-16

## 2021-11-16 RX ORDER — FLUMAZENIL 0.1 MG/ML
0.2 INJECTION, SOLUTION INTRAVENOUS
Status: CANCELLED | OUTPATIENT
Start: 2021-11-16 | End: 2021-11-17

## 2021-11-16 RX ORDER — ONDANSETRON 2 MG/ML
4 INJECTION INTRAMUSCULAR; INTRAVENOUS EVERY 6 HOURS PRN
Status: CANCELLED | OUTPATIENT
Start: 2021-11-16

## 2021-11-16 RX ORDER — PROPOFOL 10 MG/ML
INJECTION, EMULSION INTRAVENOUS PRN
Status: DISCONTINUED | OUTPATIENT
Start: 2021-11-16 | End: 2021-11-16

## 2021-11-16 RX ADMIN — LIDOCAINE HYDROCHLORIDE 50 MG: 20 INJECTION, SOLUTION INFILTRATION; PERINEURAL at 12:56

## 2021-11-16 RX ADMIN — PROPOFOL 180 MCG/KG/MIN: 10 INJECTION, EMULSION INTRAVENOUS at 12:56

## 2021-11-16 RX ADMIN — SODIUM CHLORIDE, POTASSIUM CHLORIDE, SODIUM LACTATE AND CALCIUM CHLORIDE: 600; 310; 30; 20 INJECTION, SOLUTION INTRAVENOUS at 12:50

## 2021-11-16 RX ADMIN — PROPOFOL 50 MG: 10 INJECTION, EMULSION INTRAVENOUS at 12:56

## 2021-11-16 RX ADMIN — PROPOFOL 150 MCG/KG/MIN: 10 INJECTION, EMULSION INTRAVENOUS at 12:58

## 2021-11-16 NOTE — DISCHARGE INSTRUCTIONS
[facility]    Home Care Following Endoscopy          Activity:    You have just undergone an endoscopic procedure usually performed with conscious sedation.  Do not work or operate machinery (including a car) for at least 12 hours.      I encourage you to walk and attempt to pass this air as soon as possible.    Diet:    Return to the diet you were on before your procedure but eat lightly for the first 12-24 hours.    Drink plenty of water.    Resume any regular medications unless otherwise advised by your physician.  Please begin any new medication prescribed as a result of your procedure as directed by your physician.     If you had any biopsy or polyp removed please refrain from aspirin or aspirin products for 2 days.  If on Coumadin please restart as instructed by your physician.   Pain:    You may take Tylenol as needed for pain.  Expected Recovery:    You can expect some mild abdominal fullness and/or discomfort due to the air used to inflate your intestinal tract.    Call Your Physician if You Have:    After Colonoscopy:  o Worsening persisting abdominal pain which is worse with activity.  o Fevers (>101 degrees F), chills or shakes.  o Passage of continued blood with bowel movements.     Any questions or concerns about your recovery, please call [phone] or after hours [phone] Nurse Advice Line.    Follow-up Care:  You had two polyps removed.  The polyps will be sent to pathology.  Refer to your procedure report.    You should receive a call or letter from Dr. Wylie with your pathology results within 2-3 weeks. Please call if you have not received a notification of your results.  If asked to return to clinic please make an appointment 1 week after your procedure.  Call [phone].

## 2021-11-16 NOTE — LETTER
Janet SINHA Srnsky  [address]   [place] [address]    November 22, 2021      Dear Janet,  This letter is to inform you of the results of your pathology report from your colonoscopy.  Your pathology report was:  Final Diagnosis   A: Large intestine, transverse, polypectomy:  -Tubular adenoma, no evidence of high-grade dysplasia or invasive carcinoma  B: Large intestine, sigmoid, polypectomy:  -Multiple fragments of tubular adenoma, no evidence of high-grade dysplasia or invasive carcinoma     These are benign polyps. These types of polyps do carry a small risk of developing into a cancer over time if not removed. Yours were completely removed at the time of your colonoscopy. You should have another surveillance colonoscopy in 5 years.  If you have further questions please don t hesitate to call our clinic at [phone].   Sincerely,     Fede Wylie, DO

## 2021-11-16 NOTE — ANESTHESIA CARE TRANSFER NOTE
Patient: Janet Ram    Procedure: Procedure(s):  COLONOSCOPY with polypectomy       Diagnosis: Encounter for screening colonoscopy [Z12.11]  Diagnosis Additional Information: No value filed.    Anesthesia Type:   MAC     Note:    Oropharynx: oropharynx clear of all foreign objects and spontaneously breathing  Level of Consciousness: drowsy  Oxygen Supplementation: face mask    Independent Airway: airway patency satisfactory and stable  Dentition: dentition unchanged  Vital Signs Stable: post-procedure vital signs reviewed and stable  Report to RN Given: handoff report given  Patient transferred to: Phase II    Handoff Report: Identifed the Patient, Identified the Reponsible Provider, Reviewed the pertinent medical history, Discussed the surgical course, Reviewed Intra-OP anesthesia mangement and issues during anesthesia, Set expectations for post-procedure period and Allowed opportunity for questions and acknowledgement of understanding      Vitals:  Vitals Value Taken Time   /66 11/16/21 1345   Temp     Pulse 61 11/16/21 1345   Resp 16 11/16/21 1345   SpO2 99 % 11/16/21 1351   Vitals shown include unvalidated device data.    Electronically Signed By: PHILIP Martinez CRNA  November 16, 2021  1:51 PM

## 2021-11-16 NOTE — ANESTHESIA PREPROCEDURE EVALUATION
Anesthesia Pre-Procedure Evaluation    Patient: Janet SINHA Srnsky   MRN: [id number] : 1955        Preoperative Diagnosis: Encounter for screening colonoscopy [Z12.11]    Procedure : Procedure(s):  COLONOSCOPY          Past Medical History:   Diagnosis Date     Diabetic eye exam (H) 2015    [facility]     Hypertension      Type II or unspecified type diabetes mellitus without mention of complication, not stated as uncontrolled       Past Surgical History:   Procedure Laterality Date     COLONOSCOPY N/A 3/17/2015    Procedure: COLONOSCOPY;  Surgeon: Mike Elizondo MD;  Location:  GI     COLONOSCOPY N/A 3/18/2015    Procedure: COMBINED COLONOSCOPY, SINGLE OR MULTIPLE BIOPSY/POLYPECTOMY BY BIOPSY;  Surgeon: Mike Elizondo MD;  Location: [facility]     COLONOSCOPY N/A 2018    Procedure: COLONOSCOPY;  COLONOSCOPY;  Surgeon: Josse Bender DO;  Location:  GI     COLONOSCOPY N/A 10/21/2021    Procedure: ABORTED COLONOSCOPY;  Surgeon: Archana Armstrong DO;  Location:  GI     COLONOSCOPY N/A 11/15/2021    Procedure: colonoscopy aborted due to poor prep;  Surgeon: Mike Smith MD;  Location:  GI      Allergies   Allergen Reactions     Penicillins      Too young to remember reaction      Social History     Tobacco Use     Smoking status: Never Smoker     Smokeless tobacco: Never Used   Substance Use Topics     Alcohol use: Yes     Alcohol/week: 0.0 standard drinks     Comment: rare      Wt Readings from Last 1 Encounters:   10/18/21 94.3 kg (208 lb)        Anesthesia Evaluation   Pt has had prior anesthetic. Type: MAC.    No history of anesthetic complications       ROS/MED HX  ENT/Pulmonary:  - neg pulmonary ROS     Neurologic:  - neg neurologic ROS     Cardiovascular:     (+) Dyslipidemia hypertension-----Previous cardiac testing   Echo: Date: Results:    Stress Test: Date: Results:    ECG Reviewed: Date: 10/18/2021 Results:  - NSR  Cath: Date: Results:       METS/Exercise Tolerance:     Hematologic:  - neg hematologic  ROS     Musculoskeletal:  - neg musculoskeletal ROS     GI/Hepatic:     (+) bowel prep,     Renal/Genitourinary:  - neg Renal ROS     Endo:     (+) type II DM, Last HgA1c: 8.7, date: 10/18/2021, Using insulin, - not using insulin pump. thyroid problem, hypothyroidism, Obesity,     Psychiatric/Substance Use:  - neg psychiatric ROS     Infectious Disease:       Malignancy:       Other:  - neg other ROS          Physical Exam    Airway  airway exam normal      Mallampati: II   TM distance: > 3 FB   Neck ROM: full   Mouth opening: > 3 cm    Respiratory Devices and Support         Dental       (+) caps      Cardiovascular   cardiovascular exam normal       Rhythm and rate: regular and normal     Pulmonary   pulmonary exam normal        breath sounds clear to auscultation           OUTSIDE LABS:  CBC:   Lab Results   Component Value Date    WBC 12.0 (H) 04/26/2016    WBC 15.2 (H) 04/24/2016    HGB 12.4 04/26/2016    HGB 11.8 04/24/2016    HCT 42.4 04/19/2018    HCT 36.7 04/26/2016     04/26/2016     04/24/2016     BMP:   Lab Results   Component Value Date     09/13/2021     11/30/2020    POTASSIUM 4.3 09/13/2021    POTASSIUM 4.1 11/30/2020    CHLORIDE 111 (H) 09/13/2021    CHLORIDE 110 (H) 11/30/2020    CO2 25 09/13/2021    CO2 23 11/30/2020    BUN 16 09/13/2021    BUN 13 11/30/2020    CR 0.97 09/13/2021    CR 0.79 11/30/2020     (H) 11/15/2021     (H) 10/21/2021     COAGS: No results found for: PTT, INR, FIBR  POC:   Lab Results   Component Value Date     (H) 08/08/2018     HEPATIC:   Lab Results   Component Value Date    ALBUMIN 3.6 09/13/2021    PROTTOTAL 6.9 09/13/2021    ALT 29 09/13/2021    AST 15 09/13/2021    ALKPHOS 86 09/13/2021    BILITOTAL 1.4 (H) 09/13/2021     OTHER:   Lab Results   Component Value Date    A1C 8.7 (H) 10/18/2021    MC 9.4 09/13/2021    LIPASE 77 04/23/2016    TSH 3.19 03/16/2021     T4 1.02 09/24/2018       Anesthesia Plan    ASA Status:  2   NPO Status:  NPO Appropriate    Anesthesia Type: MAC.      Maintenance: TIVA.        Consents    Anesthesia Plan(s) and associated risks, benefits, and realistic alternatives discussed. Questions answered and patient/representative(s) expressed understanding.     - Discussed with:  Patient      - Extended Intubation/Ventilatory Support Discussed: No.      - Patient is DNR/DNI Status: No    Use of blood products discussed: No .     Postoperative Care       PONV prophylaxis: Background Propofol Infusion     Comments:    The risks and benefits of anesthesia, and the alternatives where applicable, have been discussed with the patient, and they wish to proceed.            PHILIP Martinez CRNA

## 2021-11-16 NOTE — ANESTHESIA POSTPROCEDURE EVALUATION
Patient: Janet Ram    Procedure: Procedure(s):  COLONOSCOPY with polypectomy       Diagnosis:Encounter for screening colonoscopy [Z12.11]  Diagnosis Additional Information: No value filed.    Anesthesia Type:  MAC    Note:  Disposition: Outpatient   Postop Pain Control: Uneventful            Sign Out: Well controlled pain   PONV: No   Neuro/Psych: Uneventful            Sign Out: Acceptable/Baseline neuro status   Airway/Respiratory: Uneventful            Sign Out: Acceptable/Baseline resp. status   CV/Hemodynamics: Uneventful            Sign Out: Acceptable CV status   Other NRE: NONE   DID A NON-ROUTINE EVENT OCCUR? No    Event details/Postop Comments:  Pt was happy with anesthesia care.  No complications.  I will follow up with the pt if needed.           Last vitals:  Vitals Value Taken Time   /66 11/16/21 1345   Temp     Pulse 61 11/16/21 1345   Resp 16 11/16/21 1345   SpO2 99 % 11/16/21 1351   Vitals shown include unvalidated device data.    Electronically Signed By: PHILIP Martinez CRNA  November 16, 2021  1:52 PM

## 2021-11-18 LAB
PATH REPORT.COMMENTS IMP SPEC: NORMAL
PATH REPORT.FINAL DX SPEC: NORMAL
PATH REPORT.GROSS SPEC: NORMAL
PATH REPORT.MICROSCOPIC SPEC OTHER STN: NORMAL
PHOTO IMAGE: NORMAL

## 2021-11-22 DIAGNOSIS — E11.65 TYPE 2 DIABETES MELLITUS WITH HYPERGLYCEMIA, UNSPECIFIED WHETHER LONG TERM INSULIN USE (H): ICD-10-CM

## 2021-11-24 DIAGNOSIS — E11.65 TYPE 2 DIABETES MELLITUS WITH HYPERGLYCEMIA, UNSPECIFIED WHETHER LONG TERM INSULIN USE (H): ICD-10-CM

## 2021-11-24 RX ORDER — GLIPIZIDE 10 MG/1
TABLET, FILM COATED, EXTENDED RELEASE ORAL
Qty: 180 TABLET | Refills: 0 | OUTPATIENT
Start: 2021-11-24

## 2021-11-26 RX ORDER — GLIPIZIDE 10 MG/1
TABLET, FILM COATED, EXTENDED RELEASE ORAL
Qty: 180 TABLET | Refills: 0 | Status: SHIPPED | OUTPATIENT
Start: 2021-11-26 | End: 2022-03-08

## 2021-11-26 NOTE — TELEPHONE ENCOUNTER
"Requested Prescriptions   Pending Prescriptions Disp Refills     glipiZIDE (GLUCOTROL XL) 10 MG 24 hr tablet [Pharmacy Med Name: glipiZIDE ER 10 MG Oral Tablet Extended Release 24 Hour] 180 tablet 0     Sig: Take 1 tablet by mouth twice daily       Sulfonylurea Agents Passed - 11/24/2021  7:16 PM        Passed - Patient has documented A1c within the specified period of time.     If HgbA1C is 8 or greater, it needs to be on file within the past 3 months.  If less than 8, must be on file within the past 6 months.     Recent Labs   Lab Test 10/18/21  1728   A1C 8.7*             Passed - Medication is active on med list        Passed - Patient is age 18 or older        Passed - No active pregnancy on record        Passed - Patient has a recent creatinine (normal) within the past 12 mos.     Recent Labs   Lab Test 09/13/21  0829   CR 0.97       Ok to refill medication if creatinine is low          Passed - Patient has not had a positive pregnancy test within the past 12 mos.        Passed - Recent (6 mo) or future (30 days) visit within the authorizing provider's specialty     Patient had office visit in the last 6 months or has a visit in the next 30 days with authorizing provider or within the authorizing provider's specialty.  See \"Patient Info\" tab in inbasket, or \"Choose Columns\" in Meds & Orders section of the refill encounter.               Prescription approved per [facility] Refill Protocol.    MARGOT Morse, RN    "

## 2022-02-04 DIAGNOSIS — E06.3 HASHIMOTO'S THYROIDITIS: ICD-10-CM

## 2022-02-04 DIAGNOSIS — E11.65 TYPE 2 DIABETES MELLITUS WITH HYPERGLYCEMIA, UNSPECIFIED WHETHER LONG TERM INSULIN USE (H): ICD-10-CM

## 2022-02-04 DIAGNOSIS — E78.5 HYPERLIPIDEMIA LDL GOAL <100: ICD-10-CM

## 2022-02-04 DIAGNOSIS — I10 HYPERTENSION GOAL BP (BLOOD PRESSURE) < 140/90: ICD-10-CM

## 2022-02-04 RX ORDER — LEVOTHYROXINE SODIUM 50 UG/1
TABLET ORAL
Qty: 90 TABLET | Refills: 0 | Status: SHIPPED | OUTPATIENT
Start: 2022-02-04 | End: 2022-05-13

## 2022-02-04 RX ORDER — ATORVASTATIN CALCIUM 40 MG/1
TABLET, FILM COATED ORAL
Qty: 90 TABLET | Refills: 0 | Status: SHIPPED | OUTPATIENT
Start: 2022-02-04 | End: 2022-03-08

## 2022-02-04 NOTE — TELEPHONE ENCOUNTER
Pending Prescriptions:                       Disp   Refills    EUTHYROX 50 MCG tablet [Pharmacy Med Name*90 tab*0            Sig: Take 1 tablet by mouth once daily    atorvastatin (LIPITOR) 40 MG tablet [Phar*90 tab*0            Sig: Take 1 tablet by mouth once daily    Medication is being filled for 1 time ej refill only due to:  Patient is due for med check before out    Please call and help schedule.  Thank you!

## 2022-02-17 PROBLEM — E11.65 TYPE 2 DIABETES MELLITUS WITH HYPERGLYCEMIA (H): Status: ACTIVE | Noted: 2017-09-15

## 2022-02-17 PROBLEM — E11.65 TYPE 2 DIABETES MELLITUS WITH HYPERGLYCEMIA (H): Status: ACTIVE | Noted: 2018-08-01

## 2022-03-02 ENCOUNTER — TRANSFERRED RECORDS (OUTPATIENT)
Dept: HEALTH INFORMATION MANAGEMENT | Facility: CLINIC | Age: 67
End: 2022-03-02
Payer: COMMERCIAL

## 2022-03-02 LAB — RETINOPATHY: POSITIVE

## 2022-03-08 ENCOUNTER — OFFICE VISIT (OUTPATIENT)
Dept: FAMILY MEDICINE | Facility: OTHER | Age: 67
End: 2022-03-08
Payer: COMMERCIAL

## 2022-03-08 VITALS
TEMPERATURE: 97.8 F | RESPIRATION RATE: 18 BRPM | WEIGHT: 210 LBS | HEIGHT: 65 IN | BODY MASS INDEX: 34.99 KG/M2 | SYSTOLIC BLOOD PRESSURE: 122 MMHG | OXYGEN SATURATION: 100 % | DIASTOLIC BLOOD PRESSURE: 70 MMHG | HEART RATE: 68 BPM

## 2022-03-08 DIAGNOSIS — I10 HYPERTENSION GOAL BP (BLOOD PRESSURE) < 140/90: ICD-10-CM

## 2022-03-08 DIAGNOSIS — E78.5 HYPERLIPIDEMIA LDL GOAL <100: ICD-10-CM

## 2022-03-08 DIAGNOSIS — E06.3 HASHIMOTO'S THYROIDITIS: ICD-10-CM

## 2022-03-08 DIAGNOSIS — Z00.00 ENCOUNTER FOR SUBSEQUENT ANNUAL WELLNESS VISIT (AWV) IN MEDICARE PATIENT: Primary | ICD-10-CM

## 2022-03-08 DIAGNOSIS — E11.65 TYPE 2 DIABETES MELLITUS WITH HYPERGLYCEMIA, UNSPECIFIED WHETHER LONG TERM INSULIN USE (H): ICD-10-CM

## 2022-03-08 DIAGNOSIS — Z13.0 SCREENING FOR DEFICIENCY ANEMIA: ICD-10-CM

## 2022-03-08 DIAGNOSIS — E66.01 MORBID OBESITY (H): ICD-10-CM

## 2022-03-08 DIAGNOSIS — Z12.31 ENCOUNTER FOR SCREENING MAMMOGRAM FOR BREAST CANCER: ICD-10-CM

## 2022-03-08 LAB
CREAT UR-MCNC: 77 MG/DL
ERYTHROCYTE [DISTWIDTH] IN BLOOD BY AUTOMATED COUNT: 12.4 % (ref 10–15)
HBA1C MFR BLD: 8.8 % (ref 0–5.6)
HCT VFR BLD AUTO: 38.2 % (ref 35–47)
HGB BLD-MCNC: 12.8 G/DL (ref 11.7–15.7)
MCH RBC QN AUTO: 30.8 PG (ref 26.5–33)
MCHC RBC AUTO-ENTMCNC: 33.5 G/DL (ref 31.5–36.5)
MCV RBC AUTO: 92 FL (ref 78–100)
MICROALBUMIN UR-MCNC: 12 MG/L
MICROALBUMIN/CREAT UR: 15.58 MG/G CR (ref 0–25)
PLATELET # BLD AUTO: 302 10E3/UL (ref 150–450)
RBC # BLD AUTO: 4.16 10E6/UL (ref 3.8–5.2)
TSH SERPL DL<=0.005 MIU/L-ACNC: 2.87 MU/L (ref 0.4–4)
WBC # BLD AUTO: 8.1 10E3/UL (ref 4–11)

## 2022-03-08 PROCEDURE — 36415 COLL VENOUS BLD VENIPUNCTURE: CPT | Performed by: PHYSICIAN ASSISTANT

## 2022-03-08 PROCEDURE — 82043 UR ALBUMIN QUANTITATIVE: CPT | Performed by: PHYSICIAN ASSISTANT

## 2022-03-08 PROCEDURE — 83036 HEMOGLOBIN GLYCOSYLATED A1C: CPT | Performed by: PHYSICIAN ASSISTANT

## 2022-03-08 PROCEDURE — 85027 COMPLETE CBC AUTOMATED: CPT | Performed by: PHYSICIAN ASSISTANT

## 2022-03-08 PROCEDURE — G0438 PPPS, INITIAL VISIT: HCPCS | Performed by: PHYSICIAN ASSISTANT

## 2022-03-08 PROCEDURE — 99214 OFFICE O/P EST MOD 30 MIN: CPT | Mod: 25 | Performed by: PHYSICIAN ASSISTANT

## 2022-03-08 PROCEDURE — 84443 ASSAY THYROID STIM HORMONE: CPT | Performed by: PHYSICIAN ASSISTANT

## 2022-03-08 PROCEDURE — 90662 IIV NO PRSV INCREASED AG IM: CPT | Performed by: PHYSICIAN ASSISTANT

## 2022-03-08 PROCEDURE — G0008 ADMIN INFLUENZA VIRUS VAC: HCPCS | Performed by: PHYSICIAN ASSISTANT

## 2022-03-08 RX ORDER — GLIPIZIDE 10 MG/1
10 TABLET, FILM COATED, EXTENDED RELEASE ORAL 2 TIMES DAILY
Qty: 180 TABLET | Refills: 1 | Status: SHIPPED | OUTPATIENT
Start: 2022-03-08 | End: 2022-08-29

## 2022-03-08 RX ORDER — ATORVASTATIN CALCIUM 40 MG/1
40 TABLET, FILM COATED ORAL DAILY
Qty: 90 TABLET | Refills: 3 | Status: SHIPPED | OUTPATIENT
Start: 2022-03-08 | End: 2023-03-29

## 2022-03-08 ASSESSMENT — PAIN SCALES - GENERAL: PAINLEVEL: NO PAIN (0)

## 2022-03-08 NOTE — PROGRESS NOTES
"  Assessment & Plan     Encounter for subsequent annual wellness visit (AWV) in Medicare patient  Recommend routine annual visits, she may use hydrocortisone on her dry spots of skin and continue to use lotion    Type 2 diabetes mellitus with hyperglycemia, unspecified whether long term insulin use (H)  Control has been better recently, continue current meds of A1c is not to goal recheck in 3 months encouraged her to continue to eat healthy cut out extra snacking and exercise as she is able  - Albumin Random Urine Quantitative with Creat Ratio; Future  - Hemoglobin A1c; Future  - atorvastatin (LIPITOR) 40 MG tablet; Take 1 tablet (40 mg) by mouth daily  - Dulaglutide 3 MG/0.5ML SOPN; Inject 3 mg Subcutaneous every 7 days  - glipiZIDE (GLUCOTROL XL) 10 MG 24 hr tablet; Take 1 tablet (10 mg) by mouth 2 times daily  - metFORMIN (GLUCOPHAGE) 1000 MG tablet; TAKE 1 TABLET BY MOUTH TWICE DAILY WITH MEALS  - Hemoglobin A1c  - Albumin Random Urine Quantitative with Creat Ratio    Morbid obesity (H)  She will eat less exercise more as above she has already made some changes her goal is to be under 200 pounds for our next appointment    Hypertension goal BP (blood pressure) < 140/90  At goal, continue current meds  - atorvastatin (LIPITOR) 40 MG tablet; Take 1 tablet (40 mg) by mouth daily    Hyperlipidemia LDL goal <100    - atorvastatin (LIPITOR) 40 MG tablet; Take 1 tablet (40 mg) by mouth daily    Screening for deficiency anemia    - CBC with platelets; Future  - CBC with platelets    Hashimoto's thyroiditis    - TSH with free T4 reflex; Future  - TSH with free T4 reflex    Encounter for screening mammogram for breast cancer    - *MA Screening Digital Bilateral; Future             BMI:   Estimated body mass index is 35.42 kg/m  as calculated from the following:    Height as of this encounter: 1.64 m (5' 4.57\").    Weight as of this encounter: 95.3 kg (210 lb).   Weight management plan: Discussed healthy diet and exercise " guidelines        Return in about 6 months (around 9/8/2022) for diabetes.    Estephanie Mancia PA-C  [facility] JOAO Gonzales is a 67 year old who presents for the following health issues     -Also random dots on skin --right upper arm- no itch no pain, they come and go.      History of Present Illness       Diabetes:   She presents for follow up of diabetes.  She is checking home blood glucose one time daily (recent bs 110-120 in the last month, previous 130-140, rare 150s). She checks blood glucose before meals.  Blood glucose is never over 200 and never under 70. She is aware of hypoglycemia symptoms including shakiness. She has no concerns regarding her diabetes at this time.  She is having burning in feet. The patient has had a diabetic eye exam in the last 12 months. Eye exam performed on retinal specialist- will send to scanning.         Hyperlipidemia:  She presents for follow up of hyperlipidemia.  She is taking medication to lower cholesterol. She is not having myalgia or other side effects to statin medications.    Hypertension: She presents for follow up of hypertension.  She does not check blood pressure  regularly outside of the clinic. Outside blood pressures have been over 140/90. She follows a low salt diet.     Hypothyroidism:     Since last visit, patient describes the following symptoms::  None    She eats 0-1 servings of fruits and vegetables daily.She consumes 0 sweetened beverage(s) daily.She exercises with enough effort to increase her heart rate 9 or less minutes per day.  She exercises with enough effort to increase her heart rate 3 or less days per week.   She is taking medications regularly.       Annual Wellness Visit    Patient has been advised of split billing requirements and indicates understanding: Yes     Are you in the first 12 months of your Medicare Part B coverage?  No    Physical Health:    In general, how would you rate your overall physical  "health? good    Outside of work, how many days during the week do you exercise?none    Outside of work, approximately how many minutes a day do you exercise?not applicable    If you drink alcohol do you typically have >3 drinks per day or >7 drinks per week? No    Do you usually eat at least 4 servings of fruit and vegetables a day, include whole grains & fiber and avoid regularly eating high fat or \"junk\" foods? Yes    Do you have any problems taking medications regularly? No    Do you have any side effects from medications? none    Needs assistance for the following daily activities: no assistance needed    Which of the following safety concerns are present in your home?  none identified     Hearing impairment: No    In the past 6 months, have you been bothered by leaking of urine? 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  She does go up a large flight of stairs 3 times a day and walks down a long haul several times a day  Mental Health:    In general, how would you rate your overall mental or emotional health? good  PHQ-2 Score: 0    Do you feel safe in your environment? Yes    Have you ever done Advance Care Planning? (For example, a Health Directive, POLST, or a discussion with a medical provider or your loved ones about your wishes)? No, advance care planning information given to patient to review.  Patient plans to discuss their wishes with loved ones or provider.      Fall risk:  Fallen 2 or more times in the past year?: No  Any fall with injury in the past year?: No    Cognitive Screenin) Repeat 3 items (Leader, Season, Table)    2) Clock draw: NORMAL  3) 3 item recall: Recalls 2 objects   Results: NORMAL clock, 1-2 items recalled: COGNITIVE IMPAIRMENT LESS LIKELY    Mini-CogTM Copyright NIDA Heller. Licensed by the author for use in [facility]; reprinted with permission (laura@.edu). All rights reserved.      Do " "you have sleep apnea, excessive snoring or daytime drowsiness?: no    Current providers sharing in care for this patient include:   Patient Care Team:  Estephanie Manica PA-C as PCP - General (Physician Assistant)  Estephanie Mancia PA-C as Assigned PCP    Patient has been advised of split billing requirements and indicates understanding: Yes      Review of Systems   CONSTITUTIONAL: NEGATIVE for fever, chills, change in weight  INTEGUMENTARY/SKIN: She has intermittent red dry patches on her upper extremities in particular currently on her right upper arm they do not itch or bother her she wonders what to do for them  EYES: Following with retinal specialist  ENT/MOUTH: NEGATIVE for ear, mouth and throat problems  RESP: NEGATIVE for significant cough or SOB  CV: NEGATIVE for chest pain, palpitations or peripheral edema  GI: NEGATIVE for nausea, abdominal pain, heartburn, or change in bowel habits  : Negative for vaginal or urinary symptoms  MUSCULOSKELETAL: NEGATIVE for significant arthralgias or myalgia  NEURO: NEGATIVE for weakness, dizziness or paresthesias  PSYCHIATRIC: NEGATIVE for changes in mood or affect      Objective    /70   Pulse 68   Temp 97.8  F (36.6  C) (Temporal)   Resp 18   Ht 1.64 m (5' 4.57\")   Wt 95.3 kg (210 lb)   LMP  (LMP Unknown)   SpO2 100%   BMI 35.42 kg/m    Body mass index is 35.42 kg/m .  Physical Exam   GENERAL: healthy, alert and no distress  EYES: Eyes grossly normal to inspection, PERRL and conjunctivae and sclerae normal  HENT: ear canals and TM's normal, nose and mouth without ulcers or lesions  NECK: no adenopathy, no asymmetry, masses, or scars and thyroid normal to palpation  RESP: lungs clear to auscultation - no rales, rhonchi or wheezes  BREAST: normal without masses, tenderness or nipple discharge and no palpable axillary masses or adenopathy  CV: regular rate and rhythm, normal S1 S2, no S3 or S4, no murmur, click or rub, no peripheral edema and peripheral " pulses strong  ABDOMEN: soft, nontender, no hepatosplenomegaly, no masses and bowel sounds normal  MS: no gross musculoskeletal defects noted, no edema  SKIN: no suspicious lesions or rashes, she does have some dry patches on her right upper arm that are erythematous  NEURO: Normal strength and tone, mentation intact and speech normal  PSYCH: mentation appears normal, affect normal/bright    Results for orders placed or performed in visit on 03/08/22   CBC with platelets     Status: Normal   Result Value Ref Range    WBC Count 8.1 4.0 - 11.0 10e3/uL    RBC Count 4.16 3.80 - 5.20 10e6/uL    Hemoglobin 12.8 11.7 - 15.7 g/dL    Hematocrit 38.2 35.0 - 47.0 %    MCV 92 78 - 100 fL    MCH 30.8 26.5 - 33.0 pg    MCHC 33.5 31.5 - 36.5 g/dL    RDW 12.4 10.0 - 15.0 %    Platelet Count 302 150 - 450 10e3/uL   Hemoglobin A1c     Status: Abnormal   Result Value Ref Range    Hemoglobin A1C 8.8 (H) 0.0 - 5.6 %   TSH with free T4 reflex     Status: Normal   Result Value Ref Range    TSH 2.87 0.40 - 4.00 mU/L   Albumin Random Urine Quantitative with Creat Ratio     Status: None   Result Value Ref Range    Creatinine Urine mg/dL 77 mg/dL    Albumin Urine mg/L 12 mg/L    Albumin Urine mg/g Cr 15.58 0.00 - 25.00 mg/g Cr

## 2022-03-08 NOTE — LETTER
March 8, 2022      Janet SINHA Yo  [address]   [place] [facility] [place] [address]        Dear ,    We are writing to inform you of your test results.    your complete blood count, thyroid and urine protein tests are normal.  Your hemoglobin A1c is 8.8.  Since your blood sugars have been improved in the past month, please keep up with exactly what you are doing with your diet and exercise and let us just check the hemoglobin A1c again in 3 months with a lab only appointment.    I will see you again in person in 6 months.   Estephanie Mancia PA-C       Resulted Orders   CBC with platelets   Result Value Ref Range    WBC Count 8.1 4.0 - 11.0 10e3/uL    RBC Count 4.16 3.80 - 5.20 10e6/uL    Hemoglobin 12.8 11.7 - 15.7 g/dL    Hematocrit 38.2 35.0 - 47.0 %    MCV 92 78 - 100 fL    MCH 30.8 26.5 - 33.0 pg    MCHC 33.5 31.5 - 36.5 g/dL    RDW 12.4 10.0 - 15.0 %    Platelet Count 302 150 - 450 10e3/uL   Hemoglobin A1c   Result Value Ref Range    Hemoglobin A1C 8.8 (H) 0.0 - 5.6 %      Comment:      Normal <5.7%   Prediabetes 5.7-6.4%    Diabetes 6.5% or higher     Note: Adopted from ADA consensus guidelines.   TSH with free T4 reflex   Result Value Ref Range    TSH 2.87 0.40 - 4.00 mU/L   Albumin Random Urine Quantitative with Creat Ratio   Result Value Ref Range    Creatinine Urine mg/dL 77 mg/dL    Albumin Urine mg/L 12 mg/L    Albumin Urine mg/g Cr 15.58 0.00 - 25.00 mg/g Cr       If you have any questions or concerns, please call the clinic at the number listed above.       Sincerely,      Estephanie Mancia PA-C

## 2022-04-14 ENCOUNTER — ANCILLARY PROCEDURE (OUTPATIENT)
Dept: MAMMOGRAPHY | Facility: OTHER | Age: 67
End: 2022-04-14
Attending: PHYSICIAN ASSISTANT
Payer: COMMERCIAL

## 2022-04-14 DIAGNOSIS — Z12.31 ENCOUNTER FOR SCREENING MAMMOGRAM FOR BREAST CANCER: ICD-10-CM

## 2022-04-14 PROCEDURE — 77063 BREAST TOMOSYNTHESIS BI: CPT | Mod: TC | Performed by: RADIOLOGY

## 2022-04-14 PROCEDURE — 77067 SCR MAMMO BI INCL CAD: CPT | Mod: TC | Performed by: RADIOLOGY

## 2022-04-21 DIAGNOSIS — E11.65 TYPE 2 DIABETES MELLITUS WITH HYPERGLYCEMIA, UNSPECIFIED WHETHER LONG TERM INSULIN USE (H): ICD-10-CM

## 2022-05-11 DIAGNOSIS — E06.3 HASHIMOTO'S THYROIDITIS: ICD-10-CM

## 2022-05-11 DIAGNOSIS — I10 HYPERTENSION GOAL BP (BLOOD PRESSURE) < 140/90: ICD-10-CM

## 2022-05-11 DIAGNOSIS — E11.65 TYPE 2 DIABETES MELLITUS WITH HYPERGLYCEMIA, UNSPECIFIED WHETHER LONG TERM INSULIN USE (H): ICD-10-CM

## 2022-05-11 DIAGNOSIS — E78.5 HYPERLIPIDEMIA LDL GOAL <100: ICD-10-CM

## 2022-05-13 RX ORDER — LEVOTHYROXINE SODIUM 50 UG/1
TABLET ORAL
Qty: 90 TABLET | Refills: 2 | Status: SHIPPED | OUTPATIENT
Start: 2022-05-13 | End: 2023-02-09

## 2022-05-13 RX ORDER — ATORVASTATIN CALCIUM 40 MG/1
TABLET, FILM COATED ORAL
Qty: 90 TABLET | Refills: 0 | OUTPATIENT
Start: 2022-05-13

## 2022-05-13 NOTE — TELEPHONE ENCOUNTER
Euthyrox- Prescription approved per [facility] Refill Protocol.    Lipitor- should have refills at pharmacy  atorvastatin (LIPITOR) 40 MG tablet 90 tablet 3 3/8/2022  No   Sig - Route: Take 1 tablet (40 mg) by mouth daily - Oral   Sent to pharmacy as: Atorvastatin Calcium 40 MG Oral Tablet (LIPITOR)   Class: E-Prescribe   Order: [id number]   E-Prescribing Status: Receipt confirmed by pharmacy (3/8/2022  7:47 AM CST)

## 2022-05-26 DIAGNOSIS — E11.65 TYPE 2 DIABETES MELLITUS WITH HYPERGLYCEMIA, UNSPECIFIED WHETHER LONG TERM INSULIN USE (H): ICD-10-CM

## 2022-05-27 RX ORDER — GLIPIZIDE 10 MG/1
TABLET, FILM COATED, EXTENDED RELEASE ORAL
Qty: 180 TABLET | Refills: 0 | OUTPATIENT
Start: 2022-05-27

## 2022-05-30 DIAGNOSIS — E11.65 TYPE 2 DIABETES MELLITUS WITH HYPERGLYCEMIA, UNSPECIFIED WHETHER LONG TERM INSULIN USE (H): ICD-10-CM

## 2022-06-01 RX ORDER — GLIPIZIDE 10 MG/1
TABLET, FILM COATED, EXTENDED RELEASE ORAL
Qty: 180 TABLET | Refills: 0 | OUTPATIENT
Start: 2022-06-01

## 2022-06-08 ENCOUNTER — TRANSFERRED RECORDS (OUTPATIENT)
Dept: HEALTH INFORMATION MANAGEMENT | Facility: CLINIC | Age: 67
End: 2022-06-08

## 2022-08-18 ENCOUNTER — TRANSFERRED RECORDS (OUTPATIENT)
Dept: HEALTH INFORMATION MANAGEMENT | Facility: CLINIC | Age: 67
End: 2022-08-18

## 2022-08-18 LAB — RETINOPATHY: POSITIVE

## 2022-08-23 DIAGNOSIS — E11.65 TYPE 2 DIABETES MELLITUS WITH HYPERGLYCEMIA, UNSPECIFIED WHETHER LONG TERM INSULIN USE (H): ICD-10-CM

## 2022-08-29 RX ORDER — GLIPIZIDE 10 MG/1
TABLET, FILM COATED, EXTENDED RELEASE ORAL
Qty: 180 TABLET | Refills: 0 | Status: SHIPPED | OUTPATIENT
Start: 2022-08-29 | End: 2022-09-12

## 2022-08-29 NOTE — TELEPHONE ENCOUNTER
"Pending Prescriptions:                       Disp   Refills    glipiZIDE (GLUCOTROL XL) 10 MG 24 hr table*180 ta*0        Sig: Take 1 tablet by mouth twice daily    Routing refill request to provider for review/approval because:  Labs out of range:      Requested Prescriptions   Pending Prescriptions Disp Refills    glipiZIDE (GLUCOTROL XL) 10 MG 24 hr tablet [Pharmacy Med Name: glipiZIDE ER 10 MG Oral Tablet Extended Release 24 Hour] 180 tablet 0     Sig: Take 1 tablet by mouth twice daily        Sulfonylurea Agents Failed - 8/23/2022  9:54 AM        Failed - Patient has documented A1c within the specified period of time.     If HgbA1C is 8 or greater, it needs to be on file within the past 3 months.  If less than 8, must be on file within the past 6 months.     Recent Labs   Lab Test 03/08/22  0803   A1C 8.8*             Passed - Medication is active on med list        Passed - Patient is age 18 or older        Passed - No active pregnancy on record        Passed - Patient has a recent creatinine (normal) within the past 12 mos.     Recent Labs   Lab Test 09/13/21  0829   CR 0.97       Ok to refill medication if creatinine is low          Passed - Patient has not had a positive pregnancy test within the past 12 mos.        Passed - Recent (6 mo) or future (30 days) visit within the authorizing provider's specialty     Patient had office visit in the last 6 months or has a visit in the next 30 days with authorizing provider or within the authorizing provider's specialty.  See \"Patient Info\" tab in inbasket, or \"Choose Columns\" in Meds & Orders section of the refill encounter.                        "

## 2022-09-12 ENCOUNTER — OFFICE VISIT (OUTPATIENT)
Dept: FAMILY MEDICINE | Facility: CLINIC | Age: 67
End: 2022-09-12
Payer: COMMERCIAL

## 2022-09-12 ENCOUNTER — TELEPHONE (OUTPATIENT)
Dept: FAMILY MEDICINE | Facility: CLINIC | Age: 67
End: 2022-09-12

## 2022-09-12 ENCOUNTER — TELEPHONE (OUTPATIENT)
Dept: FAMILY MEDICINE | Facility: OTHER | Age: 67
End: 2022-09-12

## 2022-09-12 VITALS
HEART RATE: 67 BPM | SYSTOLIC BLOOD PRESSURE: 126 MMHG | RESPIRATION RATE: 18 BRPM | BODY MASS INDEX: 35.16 KG/M2 | OXYGEN SATURATION: 97 % | WEIGHT: 211 LBS | HEIGHT: 65 IN | DIASTOLIC BLOOD PRESSURE: 74 MMHG | TEMPERATURE: 97.9 F

## 2022-09-12 DIAGNOSIS — Z13.220 SCREENING FOR HYPERLIPIDEMIA: ICD-10-CM

## 2022-09-12 DIAGNOSIS — E11.65 TYPE 2 DIABETES MELLITUS WITH HYPERGLYCEMIA, UNSPECIFIED WHETHER LONG TERM INSULIN USE (H): Primary | ICD-10-CM

## 2022-09-12 DIAGNOSIS — Z23 NEED FOR VACCINATION: ICD-10-CM

## 2022-09-12 DIAGNOSIS — I10 BENIGN ESSENTIAL HYPERTENSION: ICD-10-CM

## 2022-09-12 LAB
ALBUMIN SERPL-MCNC: 3.5 G/DL (ref 3.4–5)
ALP SERPL-CCNC: 108 U/L (ref 40–150)
ALT SERPL W P-5'-P-CCNC: 24 U/L (ref 0–50)
ANION GAP SERPL CALCULATED.3IONS-SCNC: 6 MMOL/L (ref 3–14)
AST SERPL W P-5'-P-CCNC: 11 U/L (ref 0–45)
BILIRUB SERPL-MCNC: 1.3 MG/DL (ref 0.2–1.3)
BUN SERPL-MCNC: 16 MG/DL (ref 7–30)
CALCIUM SERPL-MCNC: 9.4 MG/DL (ref 8.5–10.1)
CHLORIDE BLD-SCNC: 107 MMOL/L (ref 94–109)
CHOLEST SERPL-MCNC: 158 MG/DL
CO2 SERPL-SCNC: 25 MMOL/L (ref 20–32)
CREAT SERPL-MCNC: 0.89 MG/DL (ref 0.52–1.04)
FASTING STATUS PATIENT QL REPORTED: YES
GFR SERPL CREATININE-BSD FRML MDRD: 71 ML/MIN/1.73M2
GLUCOSE BLD-MCNC: 210 MG/DL (ref 70–99)
HBA1C MFR BLD: 10.3 % (ref 0–5.6)
HDLC SERPL-MCNC: 52 MG/DL
LDLC SERPL CALC-MCNC: 61 MG/DL
NONHDLC SERPL-MCNC: 106 MG/DL
POTASSIUM BLD-SCNC: 4.3 MMOL/L (ref 3.4–5.3)
PROT SERPL-MCNC: 7.2 G/DL (ref 6.8–8.8)
SODIUM SERPL-SCNC: 138 MMOL/L (ref 133–144)
TRIGL SERPL-MCNC: 226 MG/DL

## 2022-09-12 PROCEDURE — 99207 PR FOOT EXAM NO CHARGE: CPT | Performed by: PHYSICIAN ASSISTANT

## 2022-09-12 PROCEDURE — 80053 COMPREHEN METABOLIC PANEL: CPT | Performed by: PHYSICIAN ASSISTANT

## 2022-09-12 PROCEDURE — 99214 OFFICE O/P EST MOD 30 MIN: CPT | Mod: 25 | Performed by: PHYSICIAN ASSISTANT

## 2022-09-12 PROCEDURE — G0008 ADMIN INFLUENZA VIRUS VAC: HCPCS | Mod: 59 | Performed by: PHYSICIAN ASSISTANT

## 2022-09-12 PROCEDURE — 36415 COLL VENOUS BLD VENIPUNCTURE: CPT | Performed by: PHYSICIAN ASSISTANT

## 2022-09-12 PROCEDURE — 83036 HEMOGLOBIN GLYCOSYLATED A1C: CPT | Performed by: PHYSICIAN ASSISTANT

## 2022-09-12 PROCEDURE — 90662 IIV NO PRSV INCREASED AG IM: CPT | Performed by: PHYSICIAN ASSISTANT

## 2022-09-12 PROCEDURE — 80061 LIPID PANEL: CPT | Performed by: PHYSICIAN ASSISTANT

## 2022-09-12 PROCEDURE — 90715 TDAP VACCINE 7 YRS/> IM: CPT | Performed by: PHYSICIAN ASSISTANT

## 2022-09-12 PROCEDURE — 90472 IMMUNIZATION ADMIN EACH ADD: CPT | Performed by: PHYSICIAN ASSISTANT

## 2022-09-12 RX ORDER — GLIPIZIDE 10 MG/1
10 TABLET, FILM COATED, EXTENDED RELEASE ORAL 2 TIMES DAILY
Qty: 180 TABLET | Refills: 1 | Status: SHIPPED | OUTPATIENT
Start: 2022-09-12 | End: 2023-03-29

## 2022-09-12 RX ORDER — LISINOPRIL 20 MG/1
20 TABLET ORAL DAILY
Qty: 90 TABLET | Refills: 3 | Status: SHIPPED | OUTPATIENT
Start: 2022-09-12 | End: 2023-03-29

## 2022-09-12 ASSESSMENT — PAIN SCALES - GENERAL: PAINLEVEL: NO PAIN (0)

## 2022-09-12 NOTE — PROGRESS NOTES
"Assessment & Plan     Screening for hyperlipidemia    - Lipid panel reflex to direct LDL Non-fasting; Future  - Lipid panel reflex to direct LDL Non-fasting    Type 2 diabetes mellitus with hyperglycemia, unspecified whether long term insulin use (H)  Not well controlled, she must make improvements to her diet, I also increased her Trulicity to a max dose of 4.5 mg weekly  - HEMOGLOBIN A1C; Future  - COMPREHENSIVE METABOLIC PANEL; Future  - Lipid panel reflex to direct LDL Non-fasting; Future  - FOOT EXAM  - glipiZIDE (GLUCOTROL XL) 10 MG 24 hr tablet; Take 1 tablet (10 mg) by mouth 2 times daily  - metFORMIN (GLUCOPHAGE) 1000 MG tablet; TAKE 1 TABLET BY MOUTH TWICE DAILY WITH MEALS  - Lipid panel reflex to direct LDL Non-fasting  - COMPREHENSIVE METABOLIC PANEL  - HEMOGLOBIN A1C  Recheck 6 months  Benign essential hypertension  At goal, continue current meds recheck 1 year  - COMPREHENSIVE METABOLIC PANEL; Future  - lisinopril (ZESTRIL) 20 MG tablet; Take 1 tablet (20 mg) by mouth daily  - COMPREHENSIVE METABOLIC PANEL    Need for vaccination  Updated, she will wait for her COVID booster until the new version is available  - TDAP VACCINE (Adacel, Boostrix)  - INFLUENZA, QUAD, HIGH DOSE, PF, 65YR + (FLUZONE HD)         BMI:   Estimated body mass index is 35.58 kg/m  as calculated from the following:    Height as of this encounter: 1.64 m (5' 4.57\").    Weight as of this encounter: 95.7 kg (211 lb).   Weight management plan: Discussed healthy diet and exercise guidelines        Return in about 6 months (around 3/12/2023) for Physical Exam.    Estephanie Mancia PA-C  [facility] PINKY Ram is a 67 year old female who presents to clinic today for the following health issues accompanied by her self:    History of Present Illness       Diabetes:   She presents for follow up of diabetes.  She is checking home blood glucose one time daily. She checks blood glucose before meals.  " "Blood glucose is sometimes over 200 and never under 70. She is aware of hypoglycemia symptoms including shakiness. She has no concerns regarding her diabetes at this time.  She is having burning in feet.         Hypertension: She presents for follow up of hypertension.  She does not check blood pressure  regularly outside of the clinic. Outpatient blood pressures have not been over 140/90. She follows a low salt diet.     Hypothyroidism:     Since last visit, patient describes the following symptoms::  Fatigue      Patient reports ports her blood sugars are in the 120s to 130s in the morning and often in the 140s to 160s in the afternoon.  She states that her diet is okay she is trying to make a better effort to make better choices.  She does not have the energy to do any exercise when she gets home but she does work in a deli and moves around all day.    She has had tinnitus for many years.  Her symptoms are the same she does not believe she has hearing loss and has never had vertigo.    Review of Systems   CONSTITUTIONAL: NEGATIVE for fever, chills, change in weight  ENT/MOUTH: NEGATIVE for ear, mouth and throat problems  RESP: NEGATIVE for significant cough or SOB  CV: NEGATIVE for chest pain, palpitations or peripheral edema  GI: NEGATIVE for nausea, abdominal pain, heartburn, or change in bowel habits  PSYCHIATRIC: NEGATIVE for changes in mood or affect      Objective    /74   Pulse 67   Temp 97.9  F (36.6  C) (Temporal)   Resp 18   Ht 1.64 m (5' 4.57\")   Wt 95.7 kg (211 lb)   LMP  (LMP Unknown)   SpO2 97%   BMI 35.58 kg/m    Body mass index is 35.58 kg/m .  Physical Exam   GENERAL: healthy, alert and no distress  NECK: no adenopathy, no asymmetry, masses, or scars and thyroid normal to palpation  RESP: lungs clear to auscultation - no rales, rhonchi or wheezes  CV: regular rate and rhythm, normal S1 S2, no S3 or S4, no murmur, click or rub, no peripheral edema and peripheral pulses " strong  ABDOMEN: soft, nontender, no hepatosplenomegaly, no masses and bowel sounds normal  MS: no gross musculoskeletal defects noted, no edema  NEURO: Normal strength and tone, mentation intact and speech normal  PSYCH: mentation appears normal, affect normal/bright  Diabetic foot exam: normal DP and PT pulses, no trophic changes or ulcerative lesions, normal sensory exam and normal monofilament exam    Results for orders placed or performed in visit on 09/12/22   Lipid panel reflex to direct LDL Non-fasting     Status: Abnormal   Result Value Ref Range    Cholesterol 158 <200 mg/dL    Triglycerides 226 (H) <150 mg/dL    Direct Measure HDL 52 >=50 mg/dL    LDL Cholesterol Calculated 61 <=100 mg/dL    Non HDL Cholesterol 106 <130 mg/dL    Patient Fasting > 8hrs? Yes     Narrative    Cholesterol  Desirable:  <200 mg/dL    Triglycerides  Normal:  Less than 150 mg/dL  Borderline High:  150-199 mg/dL  High:  200-499 mg/dL  Very High:  Greater than or equal to 500 mg/dL    Direct Measure HDL  Female:  Greater than or equal to 50 mg/dL   Male:  Greater than or equal to 40 mg/dL    LDL Cholesterol  Desirable:  <100mg/dL  Above Desirable:  100-129 mg/dL   Borderline High:  130-159 mg/dL   High:  160-189 mg/dL   Very High:  >= 190 mg/dL    Non HDL Cholesterol  Desirable:  130 mg/dL  Above Desirable:  130-159 mg/dL  Borderline High:  160-189 mg/dL  High:  190-219 mg/dL  Very High:  Greater than or equal to 220 mg/dL   COMPREHENSIVE METABOLIC PANEL     Status: Abnormal   Result Value Ref Range    Sodium 138 133 - 144 mmol/L    Potassium 4.3 3.4 - 5.3 mmol/L    Chloride 107 94 - 109 mmol/L    Carbon Dioxide (CO2) 25 20 - 32 mmol/L    Anion Gap 6 3 - 14 mmol/L    Urea Nitrogen 16 7 - 30 mg/dL    Creatinine 0.89 0.52 - 1.04 mg/dL    Calcium 9.4 8.5 - 10.1 mg/dL    Glucose 210 (H) 70 - 99 mg/dL    Alkaline Phosphatase 108 40 - 150 U/L    AST 11 0 - 45 U/L    ALT 24 0 - 50 U/L    Protein Total 7.2 6.8 - 8.8 g/dL    Albumin 3.5 3.4  - 5.0 g/dL    Bilirubin Total 1.3 0.2 - 1.3 mg/dL    GFR Estimate 71 >60 mL/min/1.73m2   HEMOGLOBIN A1C     Status: Abnormal   Result Value Ref Range    Hemoglobin A1C 10.3 (H) 0.0 - 5.6 %

## 2022-09-12 NOTE — TELEPHONE ENCOUNTER
Per fax from [facility] pharmacy, please send new Rx for 4.5mg Pens Dulaglutide    Thanks  Malou Amaro RT (R)       Dulaglutide 3 MG/0.5ML SOPN  4.5 mg, EVERY 7 DAYS 5 ordered               Summary: Inject 4.5 mg Subcutaneous every 7 days, Disp-6 mL, R-5, E-Prescribe     Dose, Route, Frequency: 4.5 mg, Subcutaneous, EVERY 7 DAYS    Start: 9/12/2022

## 2022-09-12 NOTE — TELEPHONE ENCOUNTER
Please call patient, her hemoglobin A1c has increased to 10.3.  I recommend increasing the dosage of Trulicity as we discussed to 4.5 mg weekly.  Lets repeat her hemoglobin A1c again in 3 months.  We will mail her the rest of the results as I receive them tomorrow.  I have sent in the new prescription with the new dosing to her pharmacy.  Estephanie Mancia PA-C

## 2022-09-12 NOTE — TELEPHONE ENCOUNTER
Spoke with patient.  Made aware of provider instructions as per below.  Reviewed the new medication dosing with her and instructed her to double check dosing at pharmacy when picking up to assure receives the new dosing.  States understanding.  Will follow up in 3 months with A1c.  Sobeida Hussein RN

## 2022-11-12 DIAGNOSIS — E11.65 TYPE 2 DIABETES MELLITUS WITH HYPERGLYCEMIA, UNSPECIFIED WHETHER LONG TERM INSULIN USE (H): ICD-10-CM

## 2022-11-15 NOTE — TELEPHONE ENCOUNTER
Six month supply sent 9/12/22. Should not be needed.    Elizabeth Mckee RN on 11/15/2022 at 1:30 PM

## 2022-12-12 ENCOUNTER — LAB (OUTPATIENT)
Dept: LAB | Facility: CLINIC | Age: 67
End: 2022-12-12
Payer: COMMERCIAL

## 2022-12-12 ENCOUNTER — DOCUMENTATION ONLY (OUTPATIENT)
Dept: LAB | Facility: CLINIC | Age: 67
End: 2022-12-12

## 2022-12-12 DIAGNOSIS — E11.3392: ICD-10-CM

## 2022-12-12 DIAGNOSIS — Z79.4: Primary | ICD-10-CM

## 2022-12-12 DIAGNOSIS — Z79.4: ICD-10-CM

## 2022-12-12 DIAGNOSIS — E11.3392: Primary | ICD-10-CM

## 2022-12-12 DIAGNOSIS — Z00.00 LABORATORY EXAMINATION ORDERED AS PART OF A ROUTINE GENERAL MEDICAL EXAMINATION: Primary | ICD-10-CM

## 2022-12-12 LAB
HBA1C MFR BLD: 9.2 % (ref 0–5.6)
HOLD SPECIMEN: NORMAL

## 2022-12-12 PROCEDURE — 36415 COLL VENOUS BLD VENIPUNCTURE: CPT

## 2022-12-12 PROCEDURE — 83036 HEMOGLOBIN GLYCOSYLATED A1C: CPT

## 2022-12-12 NOTE — PROGRESS NOTES
I placed A1c order per last lab result note. Please order any additional labs you would like.    Chloé Enrique CMA ([facility])

## 2022-12-12 NOTE — PROGRESS NOTES
Patient is coming to lab without any available orders this morning. Please place lab orders for patient if needed.     Thanks,    Elijah LONDONO) [facility]

## 2022-12-29 ENCOUNTER — TELEPHONE (OUTPATIENT)
Dept: FAMILY MEDICINE | Facility: CLINIC | Age: 67
End: 2022-12-29

## 2022-12-29 NOTE — TELEPHONE ENCOUNTER
Dulaglutide 4.5 MG/0.5ML SOPN    Fax pharmacy message: Medication not available. Please send alternative.    [facility]

## 2022-12-29 NOTE — LETTER
[facility]  [address], [address]  [place] [address]  Phone: [phone]  Fax: [phone]  January 5, 2023    Janet Ram  [address]   [place] [address]          Dear Janet,    We care about your health and have reviewed your health plan including your medical conditions, medications, and lab results.     Your dulaglutide is unavailable at your preferred pharmacy.  Please call other pharmacies that are covered by your insurance to check for availability.  When you are able to find a pharmacy with this medication available, please send your care team a message or call so we can send it back to the pharmacy.    Please call us at the [facility] [phone] (or use CallmyName) to address the above recommendations.     Thank you for trusting [facility] and we appreciate the opportunity to serve you.  We look forward to supporting your healthcare needs in the future.    Healthy Regards,    Your Health Care Team  [facility]

## 2023-01-02 NOTE — TELEPHONE ENCOUNTER
Please call patient, her dulaglutide is unavailable at her pharmacy.  Please have her call other pharmacies that are covered by her insurance to check for availability.  When she is able to find a pharmacy with this med available, please have her send me the message so I can send it back to pharmacy.  Estephanie Mancia PA-C

## 2023-01-05 ENCOUNTER — TELEPHONE (OUTPATIENT)
Dept: FAMILY MEDICINE | Facility: CLINIC | Age: 68
End: 2023-01-05

## 2023-01-05 ENCOUNTER — TRANSFERRED RECORDS (OUTPATIENT)
Dept: HEALTH INFORMATION MANAGEMENT | Facility: CLINIC | Age: 68
End: 2023-01-05

## 2023-01-05 LAB — RETINOPATHY: POSITIVE

## 2023-01-05 NOTE — TELEPHONE ENCOUNTER
"Pt called back. Staff relayed message below. Pt will call back when she finds a pharmacy.     \"Please call patient, her dulaglutide is unavailable at her pharmacy.  Please have her call other pharmacies that are covered by her insurance to check for availability.  When she is able to find a pharmacy with this med available, please have her send me the message so I can send it back to pharmacy.  Estephanie Mancia PA-C\"      "

## 2023-01-05 NOTE — TELEPHONE ENCOUNTER
"See encounter from 12/29/22     \"Please call patient, her dulaglutide is unavailable at her pharmacy.  Please have her call other pharmacies that are covered by her insurance to check for availability.  When she is able to find a pharmacy with this med available, please have her send me the message so I can send it back to pharmacy.  Estephanie Mancia PA-C\"  "

## 2023-01-05 NOTE — TELEPHONE ENCOUNTER
So Janet called back and she called [facility] pharmacy in [place] and [place] and they also do not have Trulicity in stock.    I asked her to continue to call different pharmacies to see who has this medication in currently.  I also asked her to reach out to her insurance to see who is in network and see if she could get numbers to see who has this medication.    Patient will get back to us when she has more answers.    Sharonda Mireles RN  [facility] ~ Registered Nurse  Clinic Triage ~ [place] [facility]  January 5, 2023

## 2023-01-05 NOTE — TELEPHONE ENCOUNTER
See encounter from 1/5/23, as seen below:    So Janet called back and she called [facility] pharmacy in [place] and [place] and they also do not have Trulicity in stock.     I asked her to continue to call different pharmacies to see who has this medication in currently.  I also asked her to reach out to her insurance to see who is in network and see if she could get numbers to see who has this medication.     Patient will get back to us when she has more answers.     Sharonda Mireles RN  [facility] ~ Registered Nurse  Clinic Triage ~ [place] [facility]  January 5, 2023

## 2023-01-11 NOTE — TELEPHONE ENCOUNTER
Staff called and LMTCB. If pt calls back please ask if they found a pharmacy that will cover the prescription and where we should send it.

## 2023-02-08 DIAGNOSIS — E06.3 HASHIMOTO'S THYROIDITIS: ICD-10-CM

## 2023-02-09 RX ORDER — LEVOTHYROXINE SODIUM 50 UG/1
TABLET ORAL
Qty: 90 TABLET | Refills: 0 | Status: SHIPPED | OUTPATIENT
Start: 2023-02-09 | End: 2023-03-29

## 2023-02-09 NOTE — TELEPHONE ENCOUNTER
Pending Prescriptions:                       Disp   Refills    levothyroxine (SYNTHROID/LEVOTHROID) 50 MC*90 tab*0        Sig: Take 1 tablet by mouth once daily        Routing refill request to provider for review/approval because:  Drug not active on patient's medication list

## 2023-03-29 ENCOUNTER — OFFICE VISIT (OUTPATIENT)
Dept: FAMILY MEDICINE | Facility: CLINIC | Age: 68
End: 2023-03-29
Payer: COMMERCIAL

## 2023-03-29 VITALS
SYSTOLIC BLOOD PRESSURE: 130 MMHG | WEIGHT: 209.4 LBS | HEIGHT: 64 IN | TEMPERATURE: 98.1 F | HEART RATE: 62 BPM | DIASTOLIC BLOOD PRESSURE: 72 MMHG | BODY MASS INDEX: 35.75 KG/M2 | RESPIRATION RATE: 16 BRPM | OXYGEN SATURATION: 99 %

## 2023-03-29 DIAGNOSIS — Z23 HIGH PRIORITY FOR 2019-NCOV VACCINE: ICD-10-CM

## 2023-03-29 DIAGNOSIS — I10 HYPERTENSION GOAL BP (BLOOD PRESSURE) < 140/90: ICD-10-CM

## 2023-03-29 DIAGNOSIS — I10 BENIGN ESSENTIAL HYPERTENSION: ICD-10-CM

## 2023-03-29 DIAGNOSIS — E11.65 TYPE 2 DIABETES MELLITUS WITH HYPERGLYCEMIA, UNSPECIFIED WHETHER LONG TERM INSULIN USE (H): Primary | ICD-10-CM

## 2023-03-29 DIAGNOSIS — E06.3 HASHIMOTO'S THYROIDITIS: ICD-10-CM

## 2023-03-29 DIAGNOSIS — E66.01 MORBID OBESITY (H): ICD-10-CM

## 2023-03-29 DIAGNOSIS — Z23 NEED FOR COVID-19 VACCINE: ICD-10-CM

## 2023-03-29 DIAGNOSIS — E78.5 HYPERLIPIDEMIA LDL GOAL <100: ICD-10-CM

## 2023-03-29 LAB
CREAT UR-MCNC: 161 MG/DL
HBA1C MFR BLD: 11.4 % (ref 0–5.6)
MICROALBUMIN UR-MCNC: 43 MG/L
MICROALBUMIN/CREAT UR: 26.71 MG/G CR (ref 0–25)
TSH SERPL DL<=0.005 MIU/L-ACNC: 2.49 MU/L (ref 0.4–4)

## 2023-03-29 PROCEDURE — 83036 HEMOGLOBIN GLYCOSYLATED A1C: CPT | Performed by: PHYSICIAN ASSISTANT

## 2023-03-29 PROCEDURE — 91313 COVID-19 VACCINE BIVALENT BOOSTER 18+ (MODERNA): CPT | Performed by: PHYSICIAN ASSISTANT

## 2023-03-29 PROCEDURE — 82570 ASSAY OF URINE CREATININE: CPT | Performed by: PHYSICIAN ASSISTANT

## 2023-03-29 PROCEDURE — 84443 ASSAY THYROID STIM HORMONE: CPT | Performed by: PHYSICIAN ASSISTANT

## 2023-03-29 PROCEDURE — 99214 OFFICE O/P EST MOD 30 MIN: CPT | Mod: 25 | Performed by: PHYSICIAN ASSISTANT

## 2023-03-29 PROCEDURE — 36415 COLL VENOUS BLD VENIPUNCTURE: CPT | Performed by: PHYSICIAN ASSISTANT

## 2023-03-29 PROCEDURE — 0134A COVID-19 VACCINE BIVALENT BOOSTER 18+ (MODERNA): CPT | Performed by: PHYSICIAN ASSISTANT

## 2023-03-29 PROCEDURE — 82043 UR ALBUMIN QUANTITATIVE: CPT | Performed by: PHYSICIAN ASSISTANT

## 2023-03-29 RX ORDER — LEVOTHYROXINE SODIUM 50 UG/1
50 TABLET ORAL DAILY
Qty: 90 TABLET | Refills: 3 | Status: SHIPPED | OUTPATIENT
Start: 2023-03-29

## 2023-03-29 RX ORDER — LISINOPRIL 20 MG/1
20 TABLET ORAL DAILY
Qty: 90 TABLET | Refills: 3 | Status: SHIPPED | OUTPATIENT
Start: 2023-03-29 | End: 2024-05-09

## 2023-03-29 RX ORDER — ATORVASTATIN CALCIUM 40 MG/1
40 TABLET, FILM COATED ORAL DAILY
Qty: 90 TABLET | Refills: 3 | Status: SHIPPED | OUTPATIENT
Start: 2023-03-29 | End: 2024-05-09

## 2023-03-29 RX ORDER — GLIPIZIDE 10 MG/1
10 TABLET, FILM COATED, EXTENDED RELEASE ORAL 2 TIMES DAILY
Qty: 180 TABLET | Refills: 1 | Status: SHIPPED | OUTPATIENT
Start: 2023-03-29

## 2023-03-29 RX ORDER — LEVOTHYROXINE SODIUM 50 UG/1
50 TABLET ORAL DAILY
Qty: 90 TABLET | Refills: 0 | Status: CANCELLED | OUTPATIENT
Start: 2023-03-29

## 2023-03-29 ASSESSMENT — PAIN SCALES - GENERAL: PAINLEVEL: NO PAIN (0)

## 2023-03-29 NOTE — PROGRESS NOTES
Assessment & Plan     Hashimoto's thyroiditis  Awaiting lab results we will titrate dosage as needed  - TSH WITH FREE T4 REFLEX; Future  - TSH WITH FREE T4 REFLEX    Type 2 diabetes mellitus with hyperglycemia, unspecified whether long term insulin use (H)  Not to goal, has been off Trulicity due to it not being available at the pharmacy, she is now back on it in the last few weeks we will recheck A1c in 3 months and plan for 1 more visit prior to her relocation to [place].  She needs to work with diet and exercise to lose weight which would dramatically improve her hypertensive and blood sugar control  - Albumin Random Urine Quantitative with Creat Ratio; Future  - atorvastatin (LIPITOR) 40 MG tablet; Take 1 tablet (40 mg) by mouth daily  - Dulaglutide 4.5 MG/0.5ML SOPN; Inject 4.5 mg Subcutaneous every 7 days  - glipiZIDE (GLUCOTROL XL) 10 MG 24 hr tablet; Take 1 tablet (10 mg) by mouth 2 times daily  - metFORMIN (GLUCOPHAGE) 1000 MG tablet; TAKE 1 TABLET BY MOUTH TWICE DAILY WITH MEALS  - Hemoglobin A1c; Future  - Albumin Random Urine Quantitative with Creat Ratio  - Hemoglobin A1c    Hypertension goal BP (blood pressure) < 140/90  At goal continue current meds      Hyperlipidemia LDL goal <100  Lipids up-to-date, refilling meds  - atorvastatin (LIPITOR) 40 MG tablet; Take 1 tablet (40 mg) by mouth daily    Benign essential hypertension  At goal, continue current meds  - lisinopril (ZESTRIL) 20 MG tablet; Take 1 tablet (20 mg) by mouth daily    Morbid obesity (H)  Weight loss would dramatically improve her diabetic control and her hypertensive control.  Weight loss would reduce her morbidity and mortality overall.    Need for COVID-19 vaccine      High priority for 2019-nCoV vaccine  Updating   - COVID-19,PF,MODERNA BIVALENT 18+Yrs    Plan to do Prevnar 20 at her next appointment       BMI:   Estimated body mass index is 35.57 kg/m  as calculated from the following:    Height as of this encounter: 1.634 m (5'  "4.33\").    Weight as of this encounter: 95 kg (209 lb 6.4 oz).   Weight management plan: Discussed healthy diet and exercise guidelines        Estephanie Mancia PA-C  [facility] PINKY Gonzales is a 68 year old, presenting for the following health issues:  Diabetes, Lipids, Hypertension, and Imm/Inj (COVID-19 VACCINE)    Additional Questions 3/29/2023   Roomed by YK     Patient states she just started taking Trulicity again two weeks ago. Pharmacy was out of medication for 2 months.  She has tried to improve her diet.  She works at a [employer] and has found that she really needs to avoid the [employer] salads as a have a lot of hidden sugar.  Her blood sugars have ranged from 10 3-1 63.  She has not been checking recently though she is down 2 pounds from her last visit, 4 pounds according to her scale.    She and her  are moving to [place] in August.  She will try to come in once more so I can refill her meds prior to her departure.  She states her eyes are improving,  Her injections are every 10 weeks now.  I believe she has macular degeneration.    History of Present Illness       Diabetes:   She presents for follow up of diabetes.  She is checking home blood glucose a few times a month. She checks blood glucose before meals.  Blood glucose is sometimes over 200 and never under 70. She is aware of hypoglycemia symptoms including shakiness. She has no concerns regarding her diabetes at this time.  She is not experiencing numbness or burning in feet, excessive thirst, blurry vision, weight changes or redness, sores or blisters on feet.         Hyperlipidemia:  She presents for follow up of hyperlipidemia.  She is taking medication to lower cholesterol. She is not having myalgia or other side effects to statin medications.    Hypertension: She presents for follow up of hypertension.  She does not check blood pressure  regularly outside of the clinic. Outpatient blood pressures have not been over " "140/90. She follows a low salt diet.     She eats 0-1 servings of fruits and vegetables daily.She consumes 0 sweetened beverage(s) daily.She exercises with enough effort to increase her heart rate 9 or less minutes per day.  She exercises with enough effort to increase her heart rate 3 or less days per week.   She is taking medications regularly.                Review of Systems   Constitutional, HEENT, cardiovascular, pulmonary, gi and gu systems are negative, except as otherwise noted.      Objective    /72   Pulse 62   Temp 98.1  F (36.7  C) (Temporal)   Resp 16   Ht 1.634 m (5' 4.33\")   Wt 95 kg (209 lb 6.4 oz)   LMP  (LMP Unknown)   SpO2 99%   BMI 35.57 kg/m    Body mass index is 35.57 kg/m .  Physical Exam   GENERAL: healthy, alert and no distress  NECK: no adenopathy, no asymmetry, masses, or scars and thyroid normal to palpation  RESP: lungs clear to auscultation - no rales, rhonchi or wheezes  CV: regular rate and rhythm, normal S1 S2, no S3 or S4, no murmur, click or rub, no peripheral edema and peripheral pulses strong  ABDOMEN: soft, nontender, no hepatosplenomegaly, no masses and bowel sounds normal  MS: no gross musculoskeletal defects noted, no edema  NEURO: Normal strength and tone, mentation intact and speech normal  PSYCH: mentation appears normal, affect normal/bright    Results for orders placed or performed in visit on 03/29/23   Hemoglobin A1c     Status: Abnormal   Result Value Ref Range    Hemoglobin A1C 11.4 (H) 0.0 - 5.6 %                   "

## 2023-05-11 ENCOUNTER — ANCILLARY PROCEDURE (OUTPATIENT)
Dept: MAMMOGRAPHY | Facility: OTHER | Age: 68
End: 2023-05-11
Attending: PHYSICIAN ASSISTANT
Payer: COMMERCIAL

## 2023-05-11 DIAGNOSIS — Z12.31 SCREENING MAMMOGRAM FOR HIGH-RISK PATIENT: ICD-10-CM

## 2023-05-11 PROCEDURE — 77067 SCR MAMMO BI INCL CAD: CPT | Mod: TC | Performed by: RADIOLOGY

## 2023-05-11 PROCEDURE — 77063 BREAST TOMOSYNTHESIS BI: CPT | Mod: TC | Performed by: RADIOLOGY

## 2023-05-17 ENCOUNTER — TRANSFERRED RECORDS (OUTPATIENT)
Dept: HEALTH INFORMATION MANAGEMENT | Facility: CLINIC | Age: 68
End: 2023-05-17
Payer: COMMERCIAL

## 2023-05-17 LAB — RETINOPATHY: POSITIVE

## 2023-07-26 ENCOUNTER — TRANSFERRED RECORDS (OUTPATIENT)
Dept: HEALTH INFORMATION MANAGEMENT | Facility: CLINIC | Age: 68
End: 2023-07-26
Payer: COMMERCIAL

## 2023-07-26 LAB — RETINOPATHY: POSITIVE

## 2023-07-31 ENCOUNTER — TELEPHONE (OUTPATIENT)
Dept: FAMILY MEDICINE | Facility: CLINIC | Age: 68
End: 2023-07-31

## 2023-07-31 ENCOUNTER — OFFICE VISIT (OUTPATIENT)
Dept: FAMILY MEDICINE | Facility: CLINIC | Age: 68
End: 2023-07-31
Payer: COMMERCIAL

## 2023-07-31 VITALS
RESPIRATION RATE: 16 BRPM | DIASTOLIC BLOOD PRESSURE: 70 MMHG | WEIGHT: 203.6 LBS | SYSTOLIC BLOOD PRESSURE: 132 MMHG | HEIGHT: 64 IN | BODY MASS INDEX: 34.76 KG/M2 | OXYGEN SATURATION: 100 % | HEART RATE: 55 BPM | TEMPERATURE: 98.1 F

## 2023-07-31 DIAGNOSIS — E11.65 TYPE 2 DIABETES MELLITUS WITH HYPERGLYCEMIA, UNSPECIFIED WHETHER LONG TERM INSULIN USE (H): Primary | ICD-10-CM

## 2023-07-31 DIAGNOSIS — I10 HYPERTENSION GOAL BP (BLOOD PRESSURE) < 140/90: ICD-10-CM

## 2023-07-31 DIAGNOSIS — E06.3 HASHIMOTO'S THYROIDITIS: ICD-10-CM

## 2023-07-31 DIAGNOSIS — E66.01 MORBID OBESITY (H): ICD-10-CM

## 2023-07-31 DIAGNOSIS — E78.5 HYPERLIPIDEMIA LDL GOAL <100: ICD-10-CM

## 2023-07-31 DIAGNOSIS — E11.65 TYPE 2 DIABETES MELLITUS WITH HYPERGLYCEMIA, UNSPECIFIED WHETHER LONG TERM INSULIN USE (H): ICD-10-CM

## 2023-07-31 LAB — HBA1C MFR BLD: 10.5 % (ref 0–5.6)

## 2023-07-31 PROCEDURE — 36415 COLL VENOUS BLD VENIPUNCTURE: CPT | Performed by: PHYSICIAN ASSISTANT

## 2023-07-31 PROCEDURE — 83036 HEMOGLOBIN GLYCOSYLATED A1C: CPT | Performed by: PHYSICIAN ASSISTANT

## 2023-07-31 PROCEDURE — 99214 OFFICE O/P EST MOD 30 MIN: CPT | Performed by: PHYSICIAN ASSISTANT

## 2023-07-31 ASSESSMENT — PAIN SCALES - GENERAL: PAINLEVEL: NO PAIN (0)

## 2023-07-31 NOTE — PROGRESS NOTES
Assessment & Plan       She is moving to [place] next week, this will be our last visit.  I wished her well I have enjoyed seeing her in caring for her all these years.    Type 2 diabetes mellitus with hyperglycemia, unspecified whether long term insulin use (H)  Encouraged her to continue to lose weight and she will need to establish care with a new clinic in [place] when she arrives we will notify her of her results when they return  - Hemoglobin A1c; Future  - Hemoglobin A1c    Hashimoto's thyroiditis  TSH up-to-date, continue current dosage of levothyroxine    Hypertension goal BP (blood pressure) < 140/90  Blood pressures well controlled, continue current meds    Hyperlipidemia LDL goal <100  Labs up-to-date and well-controlled, continue current meds    Morbid obesity (H)  Encouraged continued weight loss, weight loss will help to improve all of her health conditions particularly hypertension and diabetes        Estephanie Mancia PA-C  [facility] PINKY Gonzales is a 68 year old, presenting for the following health issues:  Diabetes        7/31/2023     8:12 AM   Additional Questions   Roomed by VIKI   Accompanied by self       History of Present Illness       Diabetes:   She presents for follow up of diabetes.  She is checking home blood glucose a few times a month.   She checks blood glucose before meals.  Blood glucose is never over 200 and never under 70. She is aware of hypoglycemia symptoms including shakiness.    She has no concerns regarding her diabetes at this time.   She is not experiencing numbness or burning in feet, excessive thirst, blurry vision, weight changes or redness, sores or blisters on feet.           Hyperlipidemia:  She presents for follow up of hyperlipidemia.   She is taking medication to lower cholesterol. She is not having myalgia or other side effects to statin medications.    Hypertension: She presents for follow up of hypertension.  She does not check  "blood pressure  regularly outside of the clinic. Outpatient blood pressures have not been over 140/90. She follows a low salt diet.     She eats 0-1 servings of fruits and vegetables daily.She consumes 0 sweetened beverage(s) daily.She exercises with enough effort to increase her heart rate 9 or less minutes per day.  She exercises with enough effort to increase her heart rate 3 or less days per week.   She is taking medications regularly.       She will be moving to [place] next week.  She is excited but also tired of packing.  Her blood sugars have been improving.  She has been improving her diet but not necessarily increasing her exercise though she has been more active at home packing to move.  According to our scale she is lost approximately 6 pounds.  She did see her eye specialist last week and has been told she will need to see a specialist in a larger community when she moves to [place].      Review of Systems   Constitutional, HEENT, cardiovascular, pulmonary, gi and gu systems are negative, except as otherwise noted.      Objective    /70 (BP Location: Left arm, Patient Position: Sitting, Cuff Size: Adult Regular)   Pulse 55   Temp 98.1  F (36.7  C) (Temporal)   Resp 16   Ht 1.634 m (5' 4.33\")   Wt 92.4 kg (203 lb 9.6 oz)   LMP  (LMP Unknown)   SpO2 100%   BMI 34.59 kg/m    Body mass index is 34.59 kg/m .  Physical Exam   GENERAL: healthy, alert and no distress  NECK: no adenopathy, no asymmetry, masses, or scars and thyroid normal to palpation  RESP: lungs clear to auscultation - no rales, rhonchi or wheezes  CV: regular rate and rhythm, normal S1 S2, no S3 or S4, no murmur, click or rub, no peripheral edema and peripheral pulses strong  ABDOMEN: soft, nontender, no hepatosplenomegaly, no masses and bowel sounds normal  MS: no gross musculoskeletal defects noted, no edema  PSYCH: mentation appears normal, affect normal/bright    No results found for any visits on 07/31/23.                  "

## 2023-07-31 NOTE — TELEPHONE ENCOUNTER
----- Message from Kayla Lobato sent at 7/31/2023 12:10 PM CDT -----    ----- Message -----  From: Estephanie Mancia PA-C  Sent: 7/31/2023  12:01 PM CDT  To: [facility] Patient Care Team    Please call patient, she is moving next week and likely will not get this message if we send it via letter --your hemoglobin A1c is now 10.5.  Please establish care with a clinic in [place] as soon as possible.  Please continue to improve your diet and exercise in the meantime and continue your current medications. You are on the maximum dosage of all of your meds including the Trulicity.  Take care  Estephanie Mancia PA-C

## 2023-07-31 NOTE — TELEPHONE ENCOUNTER
Patient returned call. RN advised of the below from provider. RN also advised once patient has determined new clinic in [place] to reach out to [place] to have records transferred.     Patient verbalized understanding and all questions answered.     MARGOT Lawrence, RN  [facility] ~ Registered Nurse  Clinic Triage ~ [facility]  July 31, 2023

## 2023-10-28 DIAGNOSIS — E11.65 TYPE 2 DIABETES MELLITUS WITH HYPERGLYCEMIA, UNSPECIFIED WHETHER LONG TERM INSULIN USE (H): ICD-10-CM

## 2023-10-30 RX ORDER — GLIPIZIDE 10 MG/1
10 TABLET, FILM COATED, EXTENDED RELEASE ORAL 2 TIMES DAILY
Qty: 180 TABLET | Refills: 0 | OUTPATIENT
Start: 2023-10-30

## 2023-12-27 DIAGNOSIS — E11.65 TYPE 2 DIABETES MELLITUS WITH HYPERGLYCEMIA, UNSPECIFIED WHETHER LONG TERM INSULIN USE (H): ICD-10-CM

## 2023-12-27 NOTE — LETTER
January 3, 2024      Janet SINHA Srnsky  [address]   [place] [address]          Nik Gonzales,    We just wanted to let you know that we sent in a refill for your medication but you are due for a annual wellness visit before your next refill is due.     Please give us a call at [phone] or use [employer] to schedule.     Have a great day.    Your [facility] Care Team               Refilled medications sent for 90 days:     metFORMIN (GLUCOPHAGE) 1000 MG tablet

## 2024-01-02 NOTE — TELEPHONE ENCOUNTER
Left message for pt to return call, when call is returned give information below and schedule annual wellness visit.      Chloé Enrique CMA ([facility])

## 2024-01-03 NOTE — TELEPHONE ENCOUNTER
Patient calling back on this. She moved last August and now has a new doctor and will be going there. She will call them to schedule.     LUIGI UreñaN, RN

## 2024-04-23 DIAGNOSIS — E11.65 TYPE 2 DIABETES MELLITUS WITH HYPERGLYCEMIA, UNSPECIFIED WHETHER LONG TERM INSULIN USE (H): ICD-10-CM

## 2024-04-23 DIAGNOSIS — I10 BENIGN ESSENTIAL HYPERTENSION: ICD-10-CM

## 2024-04-23 DIAGNOSIS — E06.3 HASHIMOTO'S THYROIDITIS: ICD-10-CM

## 2024-04-23 DIAGNOSIS — I10 HYPERTENSION GOAL BP (BLOOD PRESSURE) < 140/90: ICD-10-CM

## 2024-04-23 DIAGNOSIS — E78.5 HYPERLIPIDEMIA LDL GOAL <100: ICD-10-CM

## 2024-04-23 RX ORDER — ATORVASTATIN CALCIUM 40 MG/1
40 TABLET, FILM COATED ORAL DAILY
Qty: 90 TABLET | Refills: 0 | OUTPATIENT
Start: 2024-04-23

## 2024-04-23 RX ORDER — LISINOPRIL 20 MG/1
20 TABLET ORAL DAILY
Qty: 90 TABLET | Refills: 0 | OUTPATIENT
Start: 2024-04-23

## 2024-04-23 RX ORDER — LEVOTHYROXINE SODIUM 50 UG/1
50 TABLET ORAL DAILY
Qty: 90 TABLET | Refills: 0 | OUTPATIENT
Start: 2024-04-23

## 2024-05-09 DIAGNOSIS — E78.5 HYPERLIPIDEMIA LDL GOAL <100: ICD-10-CM

## 2024-05-09 DIAGNOSIS — I10 HYPERTENSION GOAL BP (BLOOD PRESSURE) < 140/90: ICD-10-CM

## 2024-05-09 DIAGNOSIS — E11.65 TYPE 2 DIABETES MELLITUS WITH HYPERGLYCEMIA, UNSPECIFIED WHETHER LONG TERM INSULIN USE (H): ICD-10-CM

## 2024-05-09 DIAGNOSIS — I10 BENIGN ESSENTIAL HYPERTENSION: ICD-10-CM

## 2024-05-09 RX ORDER — ATORVASTATIN CALCIUM 40 MG/1
40 TABLET, FILM COATED ORAL DAILY
Qty: 30 TABLET | Refills: 0 | Status: SHIPPED | OUTPATIENT
Start: 2024-05-09

## 2024-05-09 RX ORDER — LISINOPRIL 20 MG/1
20 TABLET ORAL DAILY
Qty: 30 TABLET | Refills: 0 | Status: SHIPPED | OUTPATIENT
Start: 2024-05-09

## 2024-05-09 NOTE — TELEPHONE ENCOUNTER
Overdue for visit - 30 day given, denied 90 days.   Schedule visit with PCP- Medicare and DM  PHILIP Barr CNP

## 2025-03-04 NOTE — PATIENT INSTRUCTIONS
Check BG before breakfast and before dinner       
Detail Level: Zone
Blade: 10 blade scalpel
Post-Care Instructions: I reviewed with the patient in detail post-care instructions.
Treatment Areas: face and neck
Pre-Procedure Text: written consent obtained

## (undated) DEVICE — KIT ENDO TURNOVER/PROCEDURE CARRY-ON 101822

## (undated) DEVICE — GLOVE EXAM NITRILE LG

## (undated) DEVICE — LUBRICATING JELLY 4.25OZ

## (undated) DEVICE — SOL WATER IRRIG 1000ML BOTTLE 07139-09

## (undated) DEVICE — TUBING SUCTION 12"X1/4" N612

## (undated) RX ORDER — LIDOCAINE HYDROCHLORIDE 10 MG/ML
INJECTION, SOLUTION EPIDURAL; INFILTRATION; INTRACAUDAL; PERINEURAL
Status: DISPENSED
Start: 2018-08-08

## (undated) RX ORDER — LIDOCAINE HYDROCHLORIDE 10 MG/ML
INJECTION, SOLUTION EPIDURAL; INFILTRATION; INTRACAUDAL; PERINEURAL
Status: DISPENSED
Start: 2021-10-21

## (undated) RX ORDER — FENTANYL CITRATE 50 UG/ML
INJECTION, SOLUTION INTRAMUSCULAR; INTRAVENOUS
Status: DISPENSED
Start: 2018-08-08